# Patient Record
Sex: MALE | Race: AMERICAN INDIAN OR ALASKA NATIVE | Employment: OTHER | URBAN - METROPOLITAN AREA
[De-identification: names, ages, dates, MRNs, and addresses within clinical notes are randomized per-mention and may not be internally consistent; named-entity substitution may affect disease eponyms.]

---

## 2017-01-22 ENCOUNTER — HOSPITAL ENCOUNTER (EMERGENCY)
Facility: CLINIC | Age: 32
Discharge: HOME | End: 2017-01-22
Attending: EMERGENCY MEDICINE | Admitting: EMERGENCY MEDICINE
Payer: COMMERCIAL

## 2017-01-22 VITALS
SYSTOLIC BLOOD PRESSURE: 149 MMHG | OXYGEN SATURATION: 100 % | HEIGHT: 67 IN | WEIGHT: 150 LBS | DIASTOLIC BLOOD PRESSURE: 100 MMHG | BODY MASS INDEX: 23.54 KG/M2 | RESPIRATION RATE: 20 BRPM | TEMPERATURE: 98 F | HEART RATE: 81 BPM

## 2017-01-22 DIAGNOSIS — R68.84 PAIN IN UPPER JAW: ICD-10-CM

## 2017-01-22 DIAGNOSIS — R03.0 ELEVATED BLOOD PRESSURE READING WITHOUT DIAGNOSIS OF HYPERTENSION: ICD-10-CM

## 2017-01-22 PROCEDURE — 99283 EMERGENCY DEPT VISIT LOW MDM: CPT | Mod: 25

## 2017-01-22 PROCEDURE — 64400 NJX AA&/STRD TRIGEMINAL NRV: CPT

## 2017-01-22 RX ORDER — BUPIVACAINE HYDROCHLORIDE 5 MG/ML
INJECTION, SOLUTION PERINEURAL
Status: DISCONTINUED
Start: 2017-01-22 | End: 2017-01-22 | Stop reason: HOSPADM

## 2017-01-22 RX ORDER — CHLORHEXIDINE GLUCONATE ORAL RINSE 1.2 MG/ML
15 SOLUTION DENTAL 2 TIMES DAILY
Qty: 420 ML | Refills: 0 | Status: SHIPPED | OUTPATIENT
Start: 2017-01-22 | End: 2017-02-05

## 2017-01-22 RX ORDER — PENICILLIN V POTASSIUM 500 MG/1
500 TABLET, FILM COATED ORAL 3 TIMES DAILY
Qty: 30 TABLET | Refills: 0 | Status: SHIPPED | OUTPATIENT
Start: 2017-01-22 | End: 2018-02-22

## 2017-01-22 RX ORDER — TRAMADOL HYDROCHLORIDE 50 MG/1
50 TABLET ORAL EVERY 6 HOURS PRN
Qty: 8 TABLET | Refills: 0 | Status: SHIPPED | OUTPATIENT
Start: 2017-01-22 | End: 2018-02-22

## 2017-01-22 ASSESSMENT — ENCOUNTER SYMPTOMS
VOMITING: 0
HEADACHES: 1
FEVER: 0
DIARRHEA: 0

## 2017-01-22 NOTE — ED PROVIDER NOTES
"  History     Chief Complaint:  Dental Pain    HPI   Aly Solitario Jr. is a 31 year old male with a history of alcohol abuse who presents with dental pain. The patient explains that when his parents got divored when he was younger, and since that time, he has not seen a dentist. He also notes that he was a former alcohol abuser that has become sober and during this time, has not taken care of his teeth. Additionally, he has multiple fracutres to his back teeth bilaterally, with decompisition of multiple teeth having occured. The patient reports that for the past two weeks, he has experienced pain, centralized in his upper front and upper back jaw, with radiation up his jaw, giving him a headache. He also states that his pain is \"bugging my ears\", and has not slept well secondary to his pain. During this time, he has taken ibuprofen, which the patient notes as not effective in alleviating his pain. 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 The patient denies a fever, vomiting, or diarrhea.     Allergies:  NKDA     Medications:    The patient is currently on no regular medications.    Past Medical History:    Alcohol abuse    Past Surgical History:    Arm surgery with graft due to artery issue    Family History:    No past pertinent family history.    Social History:  Current every day smoker  Marital Status:  Single [1]     Review of Systems   Constitutional: Negative for fever.   HENT: Positive for dental problem (centralization in upper back teeth).    Gastrointestinal: Negative for vomiting and diarrhea.   Neurological: Positive for headaches.   All other systems reviewed and are negative.        Physical Exam   First Vitals:  BP: (!) 149/100 mmHg  Pulse: 81  Temp: 98  F (36.7  C)  Resp: 20  Height: 170.2 cm (5' 7\")  Weight: 68.04 kg (150 lb)  SpO2: 100 %      Physical Exam  GEN:    Pleasant, age appropriate.  HEENT:    Tympanic membranes are clear bilateral. "      Oropharynx is moist.       No tonsillar erythema without exudate or asymmetric edema.          No deviation of the uvula.     No pooling of secretions, trismus or sublingual edema.     Overall, dentition is very poor.     Dental fractures to teeth #1-2, 4, 13, 14-16 and 20.      No adjacent gingival edema or purulent drainage.  Eyes:    Conjunctiva normal, PERRL  Neck:    Supple, no meningismus.       No pain with manipulation of the hyoid.   CV:     Regular rate and rhythm.     No murmurs, rubs or gallops.    PULM:    Clear to auscultation bilateral.       No respiratory distress.       No stridor.  ABD:    Soft, non-tender, non-distended.      No rebound or guarding.  MSK:     No gross deformity to all four extremities.   LYMPH:   No cervical lymphadenopathy.  NEURO:   Alert.  Normal muscular tone, no atrophy.  Skin:    Warm, dry and intact.    PSYCH:    Mood is good and affect is appropriate.      Emergency Department Course   Procedures:   PROCEDURE NOTE:    PROCEDURE:  Infraorbital nerve block on the left side.   INDICATION:  Jaw pain  PHYSICIAN:  Marvin Marks MD  DESCRIPTION OF PROCEDURE: Informed verbal consent.  Site correctly identified.  Using dental syringe and 0.5% bupivicaine without epinephrine, the left infraorbital nerve was anesthetized using standard technique.  Patient tolerated procedure well, no complications.     Emergency Department Course:  Nursing notes and vitals reviewed.  I performed an exam of the patient as documented above.     1115 I performed an infraorbital nerve block with bupivacaine w/out epi.     Findings and plan explained to the Patient. Patient discharged home with instructions regarding supportive care, medications, and reasons to return. The importance of close follow-up was reviewed. The patient was prescribed Peridex, swish and spit 15mL in mouth two times daily for 14 days, Veetid, 500 mg 3 times daily, and Ultram, 500 mg by mouth every 6 hours as  needed.      Impression & Plan      Medical Decision Making:    Patient was seen in the ED today for dental pain.  On examination there are no signs suggestive of periapical abscess, peritonsillar abscess, retropharyngeal abscess,  Jordan's angina or Lemierre's syndrome.  The patient has diffuse pain but most severe to teeth #9-16 and the pain was controlled in the ED with infraorbital nerve block.  Patient will be discharge home on peridex rinses, PCN and 8 tablets of Tramadol. The patient will be referred to dental clinic for further management of the dental pain.   Patient is encouraged to use ibuprofen for analgesia and instructed to return for fever, worsening pain, inability to swallow, neck pain or any other concerns.      Diagnosis:    ICD-10-CM    1. Pain in upper jaw R68.84    2. Elevated blood pressure reading without diagnosis of hypertension R03.0         Discharge Medications:  New Prescriptions    CHLORHEXIDINE (PERIDEX) 0.12 % SOLUTION    Swish and spit 15 mLs in mouth 2 times daily for 14 days    PENICILLIN V POTASSIUM (VEETID) 500 MG TABLET    Take 1 tablet (500 mg) by mouth 3 times daily    TRAMADOL (ULTRAM) 50 MG TABLET    Take 1 tablet (50 mg) by mouth every 6 hours as needed for pain       Macho COCHRAN, am serving as a scribe on 1/22/2017 at 11:19 AM to personally document services performed by Marvin Marks MD based on my observations and the provider's statements to me.     Macho Staton  1/22/2017   [facility] EMERGENCY DEPARTMENT        Marvin Marks MD  01/22/17 [id number]

## 2017-01-22 NOTE — ED AVS SNAPSHOT
[facility] Emergency Department    [address] [place] [address]    Phone:  [phone]    Fax:  [phone]                                       Aly Solitario Jr.   MRN: [id number]    Department:  [facility] Emergency Department   Date of Visit:  1/22/2017           Patient Information     Date Of Birth          1985        Your diagnoses for this visit were:     Pain in upper jaw     Elevated blood pressure reading without diagnosis of hypertension        You were seen by Marvin Marks MD.        Discharge Instructions       Please make an appointment to follow up with [facility] [phone] in 7-10 days even if entirely better.    Discharge Instructions  Dental Pain    You have been seen today for a toothache. Your pain may be caused by an exposed nerve, an infection (pulpitis), a root abscess, or other problems. You will need to see a dentist for a solution to your tooth problem. Emergency Department care is only to help control your problem until you can see a dentist.  Today, we did not find any sign that your toothache was caused by a serious condition, but sometimes symptoms develop over time and cannot be found during an emergency visit, so it is very important that you follow up with your dentist.      Return to the Emergency Department if:    You develop a fever over 101 degrees Fahrenheit.    You can t open your mouth normally, can t move your tongue well, or can t swallow.    You have new or increased swelling of your face or neck.    You develop drainage of pus or foul smelling material from around your tooth.  What can I do to help myself?    Take any antibiotic the doctor may have prescribed for you today.    Avoid very hot or very cold foods as both can cause pain.    Make an appointment to see a dentist as soon as possible. If you wish, we can provide you with a list of low-cost dental clinics.   If you were  given a prescription for medicine here today, be sure to read all of the information (including the package insert) that comes with your prescription.  This will include important information about the medicine, its side effects, and any warnings that you need to know about.  The pharmacist who fills the prescription can provide more information and answer questions you may have about the medicine.  If you have questions or concerns that the pharmacist cannot address, please call or return to the Emergency Department.   Opioid Medication Information    Pain medications are among the most commonly prescribed medicines, so we are including this information for all our patients. If you did not receive pain medication or get a prescription for pain medicine, you can ignore it.     You may have been given a prescription for an opioid (narcotic) pain medicine and/or have received a pain medicine while here in the Emergency Department. These medicines can make you drowsy or impaired. You must not drive, operate dangerous equipment, or engage in any other dangerous activities while taking these medications. If you drive while taking these medications, you could be arrested for DUI, or driving under the influence. Do not drink any alcohol while you are taking these medications.     Opioid pain medications can cause addiction. If you have a history of chemical dependency of any type, you are at a higher risk of becoming addicted to pain medications.  Only take these prescribed medications to treat your pain when all other options have been tried. Take it for as short a time and as few doses as possible. Store your pain pills in a secure place, as they are frequently stolen and provide a dangerous opportunity for children or visitors in your house to start abusing these powerful medications. We will not replace any lost or stolen medicine.  As soon as your pain is better, you should flush all your remaining medication.      Many prescription pain medications contain Tylenol  (acetaminophen), including Vicodin , Tylenol #3 , Norco , Lortab , and Percocet .  You should not take any extra pills of Tylenol  if you are using these prescription medications or you can get very sick.  Do not ever take more than 3000 mg of acetaminophen in any 24 hour period.    All opioids tend to cause constipation. Drink plenty of water and eat foods that have a lot of fiber, such as fruits, vegetables, prune juice, apple juice and high fiber cereal.  Take a laxative if you don t move your bowels at least every other day. Miralax , Milk of Magnesia, Colace , or Senna  can be used to keep you regular.      Remember that you can always come back to the Emergency Department if you are not able to see your regular doctor in the amount of time listed above, if you get any new symptoms, or if there is anything that worries you.      Discharge Instructions  Dental Pain    You have been seen today for a toothache. Your pain may be caused by an exposed nerve, an infection (pulpitis), a root abscess, or other problems. You will need to see a dentist for a solution to your tooth problem. Emergency Department care is only to help control your problem until you can see a dentist.  Today, we did not find any sign that your toothache was caused by a serious condition, but sometimes symptoms develop over time and cannot be found during an emergency visit, so it is very important that you follow up with your dentist.      Return to the Emergency Department if:    You develop a fever over 101 degrees Fahrenheit    You can t open your mouth normally, can t move your tongue well, or can t swallow    You have new or increased swelling of your face or neck.    You develop drainage of pus or foul smelling material from around your tooth.  What can I do to help myself?    Take any antibiotic the doctor may have prescribed for you today.    Avoid very hot or very cold foods as  both can cause pain.    Make an appointment to see a dentist as soon as possible. If you wish, we can provide you with a list of low-cost dental clinics.       Remember that you can always come back to the Emergency Department if you are not able to see your regular doctor in the amount of time listed above, if you get any new symptoms, or if there is anything that worries you.        Dental Resources  Name/Address/Phone Eligibility Hours Fee   [facility]  [address], [address]  [place] [address]  [phone] Anyone Call for appointment [facility]  Medical Assistance  Private Insurance   [facility]  [facility]  [address] [place] [address]  [phone] Anyone Call for appointment    Mandie refers to low-cost dental clinics for non-preventive care    Lithuanian Interpreters available Prices start at   Adults        Cleaning $36-$160        X-Ray $20-40  Children        Cleaning $15        X-ray $10-20        Fluoride $10  Accepts cash, check or credit;  Does not take insurance or MA.   [facility]  [address] [place]  [address]  [phone] Anyone Afternoons and evenings    September-May    Answers phones after 10 AM $30.00 per visit   ($15.00 per visit if 62 or older)   Preventive care.  Restoration care; sliding fee; MA   [facility]  [address] [place] [address]  [phone] Children birth to age 18 and pregnant women    [facility] Residents without insurance will be asked to apply for Assured Care. M TH F 8:30 am - 5 pm  T W 8:30 am - 7 pm    30 locations metro wide    Call for appointment and to confirm hours. Sliding Fee  [facility]  Medical Assistance  Assured Access  Private Insurance    8 Languages Spoken   [facility] [address] [id number] [phone] Anyone Call for appointment Sliding Fee  Accept insurance, Miguel Ángel OHARA and  self-pay.  Call if no insurance.    All services provided.  Staff fluent in Hmong, Laotian, Pravin, Indian, Turkmen, Pitcairn Islander, and Farsi.   [facility]  [address] [place], [place] [phone] Children  Adults in a walk in basis Mon - Fri. 8 - 5 pm       (closed 1-2 pm)  General Dentists & Hygienists  Private Dentists  Dentures Fees based on family size and income ranging from 40% - 100% payment by patient.   [facility]  [address] [place], [place]  [phone] Anyone Mon - Fri 7:30 am - 5:00 pm  By Appt.    Tues & Wed @ 3:00 call for urgent care Appt for next day service Sliding fee:  MA; Insurance   [facility]ental [facility]  [address] [place]  [address]  [phone] Anyone Call for an appointment.  Days open vary every semester. Adult cleaning $25  Child cleaning $15  X-Rays $10-$15  Whitening services available  $75, includes cleaning  Seniors 50% off   [facility]  [address] [place]  [address]  [phone] Anyone M-F 8 am - 5 pm Most insurances accepted.  MA and Sliding Scale.   [facility]  [address] [place] [address]  [id number]    [phone] Anyone without insurance Call to make appt   M-F 9:00 am - 5 pm   (Closed noon hour 12-1)    6 pm- 8 pm Evening hours also available for care Sliding fee based on income and size of household.   [facility] Clinic  [address] [place] [address]  [id number]  [phone] press option 1    For the [facility] press option 4 Anyone              Anyone Monday  4 - 6:30 pm  Tuesday 12:30 - 3 & 4-6:30  Thursday 8:30 - 11 am & 12-2:30 pm  September through May only    All year around on Thursdays from 5-9 pm (only time a dentist is in.) Cleanings & X-Rays Only  Cleanings:  Adults $30                   Kids $20  X-Rays:  Adults $34                 Kids $10    MA and Sliding fee   [facility]  [address] [place] [address]    [phone] Anyone    (Hmong interpreters available) M-F 8:00 am - 5:00 pm       By appointment only  (same day appointments available) Sliding fee ($40+ may be due at appointment, remainder billed); MA; Insurance   [facility]  [address] [place] [address]    [phone]   Anyone    (Hmong interpreters available) M-Th 8:00 am - 8:00 pm  F 8:00 am - 5:00 pm    By appointment only  (same day appointments available) Sliding fee ($40+ may be due at appointment, remainder billed); MA; Insurance   [facility]  [address] [place], [place]  [address] [phone] Anyone    Must live within [facility] to qualify for sliding fee services Mon, Tues, Thurs, Fri  8:30 am - 5:00 pm  Wed. 8:30 am - 7:00 pm  All other services by appt. only Sliding Fee; MA   Offer payment plans    Have financial workers that will assist with [place] and will use sliding fee for those who do not qualify.      [facility]  [address] [place] [phone] Anyone without insurance     Hours and day of week vary  (Call ahead for hours)    Walk-in only Free Services    Cleanings; Fillings; Extractions   [facility] [address]  [phone] Anyone Call for an appointment Accept patients with [place] and Medical Assistance.  10% discount if bill is paid in full on day of service.  No sliding fee scale.     [facility]  [address] [place], [place] [phone] Anyone M-F  8 am-5 pm  Call for appt.    Walk-in hours 8 am - 11am and 1 pm - 5 pm, however take scheduled appointments first    No emergency services or oral surgeries Sliding Fee available with an [place] or [place] denial letter and proof of income.    Accepts Assured Access card and MA coverage.      Name/Address/Phone Eligibility Hours Fee   [facility]  [address] [place], [place]  [phone] Anyone with emergencies only M & W clinic begins at 6 pm   Call ahead    Alternate Fridays for children by Appt only Free   [facility]  [address] [place] [address]  [phone]    Emergencies (adults only):  [phone] Anyone Free walk-in screens for oral surgery    Call ahead for hours    All other services by appt. only  Accepts MA for pediatrics only    Rates are about 25% - 40% less than private dental office.    No sliding fee scale   [facility]  [address] [place] [address]  [phone] Anyone as long as they do not have health insurance Hours on Mondays, Tuesdays, and Thursdays Sliding fees based on monthly income    No root canals, tooth pulls or emergencies   [facility]  [address] [place] [phone] Anyone  M - F 8:00 am - 5:00 pm  Wed 8:00 am - 8 pm Sliding fees; MA; Insurance   [facility]  [address] [place] [address]  [phone] Anyone age 55+ M - F 8:00 am - 4:30 pm    Appt. only Set slightly lower fees;  MA; Insurance         Give Kids a smile day in [facility] Children Takes place in February at a few locations         Discharge Instructions  Hypertension - High Blood Pressure    During you visit to the Emergency Department, your blood pressure was higher than the recommended blood pressure.  This may be related to stress, pain, medication or other temporary conditions. In these cases, your blood pressure may return to normal on its own. If you have a history of high blood pressure, you may need to have your doctor adjust your medications. Sometimes, your high measurement here may indicate that you have developed high blood pressure that will stay high unless it is treated. Sudden very high blood pressure can cause  problems, but usually high blood pressure causes problems over months to years.      Blood pressure is almost never lowered in the Emergency Department, because studies have shown that lowering blood pressure too quickly is much more dangerous than leaving it alone.    You need to follow up with your doctor in 1-3 days to get your blood pressure rechecked.     Return to the Emergency Department if you start to have:    A severe headache.    Chest pain.    Shortness of breath.    Weakness or numbness that affects one part of the body.    Confusion.    Vision changes.    Significant swelling of legs and/or eyes.    A reaction to any medication started in the Emergency Department.    What can I do to help myself?    Avoid alcohol.    Take any blood pressure medicine that you are prescribed.    Get a good night s sleep.    Lower your salt intake.    Exercise.    Lose weight.    Manage stress.    If blood pressure medication was started in the Emergency Department:    The medicine may not have an immediate effect. The body and brain determine what blood pressure you have. The medicine s job is to retrain the body s  thermostat  to a lower blood pressure.    You will need to follow up with your doctor to see how this medicine is working for you.  If you were given a prescription for medicine here today, be sure to read all of the information (including the package insert) that comes with your prescription.  This will include important information about the medicine, its side effects, and any warnings that you need to know about.  The pharmacist who fills the prescription can provide more information and answer questions you may have about the medicine.  If you have questions or concerns that the pharmacist cannot address, please call or return to the Emergency Department.       24 Hour Appointment Hotline       To make an appointment at any [facility], call [phone]). If you don't have a family  doctor or clinic, we will help you find one. [place] clinics are conveniently located to serve the needs of you and your family.             Review of your medicines      START taking        Dose / Directions Last dose taken    chlorhexidine 0.12 % solution   Commonly known as:  PERIDEX   Dose:  15 mL   Quantity:  420 mL        Swish and spit 15 mLs in mouth 2 times daily for 14 days   Refills:  0        penicillin V potassium 500 MG tablet   Commonly known as:  VEETID   Dose:  500 mg   Quantity:  30 tablet        Take 1 tablet (500 mg) by mouth 3 times daily   Refills:  0        traMADol 50 MG tablet   Commonly known as:  ULTRAM   Dose:  50 mg   Quantity:  8 tablet        Take 1 tablet (50 mg) by mouth every 6 hours as needed for pain   Refills:  0          Our records show that you are taking the medicines listed below. If these are incorrect, please call your family doctor or clinic.        Dose / Directions Last dose taken    IBUPROFEN PO        Refills:  0                Prescriptions were sent or printed at these locations (3 Prescriptions)                   Other Prescriptions                Printed at Department/Unit printer (3 of 3)         penicillin V potassium (VEETID) 500 MG tablet               chlorhexidine (PERIDEX) 0.12 % solution               traMADol (ULTRAM) 50 MG tablet                Orders Needing Specimen Collection     None      Pending Results     No orders found from 1/21/2017 to 1/23/2017.            Pending Culture Results     No orders found from 1/21/2017 to 1/23/2017.             Test Results from your hospital stay            Clinical Quality Measure: Blood Pressure Screening     Your blood pressure was checked while you were in the emergency department today. The last reading we obtained was  BP: (!) 149/100 mmHg . Please read the guidelines below about what these numbers mean and what you should do about them.  If your systolic blood pressure (the top number) is less than 120 and  "your diastolic blood pressure (the bottom number) is less than 80, then your blood pressure is normal. There is nothing more that you need to do about it.  If your systolic blood pressure (the top number) is 120-139 or your diastolic blood pressure (the bottom number) is 80-89, your blood pressure may be higher than it should be. You should have your blood pressure rechecked within a year by a primary care provider.  If your systolic blood pressure (the top number) is 140 or greater or your diastolic blood pressure (the bottom number) is 90 or greater, you may have high blood pressure. High blood pressure is treatable, but if left untreated over time it can put you at risk for heart attack, stroke, or kidney failure. You should have your blood pressure rechecked by a primary care provider within the next 4 weeks.  If your provider in the emergency department today gave you specific instructions to follow-up with your doctor or provider even sooner than that, you should follow that instruction and not wait for up to 4 weeks for your follow-up visit.        Thank you for choosing [facility]       Thank you for choosing [facility] for your care. Our goal is always to provide you with excellent care. Hearing back from our patients is one way we can continue to improve our services. Please take a few minutes to complete the written survey that you may receive in the mail after you visit with us. Thank you!        [employer] Information     [employer] lets you send messages to your doctor, view your test results, renew your prescriptions, schedule appointments and more. To sign up, go to [email or web address] . Click on \"Log in\" on the left side of the screen, which will take you to the Welcome page. Then click on \"Sign up Now\" on the right side of the page.     You will be asked to enter the access code listed below, as well as some personal information. Please follow the directions to create your username and password.   "   Your access code is: [email or web address]  Expires: 2017 11:28 AM     Your access code will  in 90 days. If you need help or a new code, please call your [facility] or [phone].        Care EveryWhere ID     This is your Care EveryWhere ID. This could be used by other organizations to access your [facility] medical records  [id number]        After Visit Summary       This is your record. Keep this with you and show to your community pharmacist(s) and doctor(s) at your next visit.

## 2017-01-22 NOTE — ED AVS SNAPSHOT
[facility] Emergency Department    [address]    [facility] [address]    Phone:  [phone]    Fax:  [phone]                                       Aly Solitario Jr.   MRN: [id number]    Department:  [facility] Emergency Department   Date of Visit:  1/22/2017           After Visit Summary Signature Page     I have received my discharge instructions, and my questions have been answered. I have discussed any challenges I see with this plan with the nurse or doctor.    ..........................................................................................................................................  Patient/Patient Representative Signature      ..........................................................................................................................................  Patient Representative Print Name and Relationship to Patient    ..................................................               ................................................  Date                                            Time    ..........................................................................................................................................  Reviewed by Signature/Title    ...................................................              ..............................................  Date                                                            Time

## 2017-01-22 NOTE — ED NOTES
Arrives with multiple dental complaints, has several broken teeth unable to sleep d/t pain, recently got insurance so hasn't seen dentist yet. A/ox 3.

## 2017-01-22 NOTE — DISCHARGE INSTRUCTIONS
Please make an appointment to follow up with [facility] [phone] in 7-10 days even if entirely better.    Discharge Instructions  Dental Pain    You have been seen today for a toothache. Your pain may be caused by an exposed nerve, an infection (pulpitis), a root abscess, or other problems. You will need to see a dentist for a solution to your tooth problem. Emergency Department care is only to help control your problem until you can see a dentist.  Today, we did not find any sign that your toothache was caused by a serious condition, but sometimes symptoms develop over time and cannot be found during an emergency visit, so it is very important that you follow up with your dentist.      Return to the Emergency Department if:    You develop a fever over 101 degrees Fahrenheit.    You can t open your mouth normally, can t move your tongue well, or can t swallow.    You have new or increased swelling of your face or neck.    You develop drainage of pus or foul smelling material from around your tooth.  What can I do to help myself?    Take any antibiotic the doctor may have prescribed for you today.    Avoid very hot or very cold foods as both can cause pain.    Make an appointment to see a dentist as soon as possible. If you wish, we can provide you with a list of low-cost dental clinics.   If you were given a prescription for medicine here today, be sure to read all of the information (including the package insert) that comes with your prescription.  This will include important information about the medicine, its side effects, and any warnings that you need to know about.  The pharmacist who fills the prescription can provide more information and answer questions you may have about the medicine.  If you have questions or concerns that the pharmacist cannot address, please call or return to the Emergency Department.   Opioid Medication Information    Pain medications are among the most commonly  prescribed medicines, so we are including this information for all our patients. If you did not receive pain medication or get a prescription for pain medicine, you can ignore it.     You may have been given a prescription for an opioid (narcotic) pain medicine and/or have received a pain medicine while here in the Emergency Department. These medicines can make you drowsy or impaired. You must not drive, operate dangerous equipment, or engage in any other dangerous activities while taking these medications. If you drive while taking these medications, you could be arrested for DUI, or driving under the influence. Do not drink any alcohol while you are taking these medications.     Opioid pain medications can cause addiction. If you have a history of chemical dependency of any type, you are at a higher risk of becoming addicted to pain medications.  Only take these prescribed medications to treat your pain when all other options have been tried. Take it for as short a time and as few doses as possible. Store your pain pills in a secure place, as they are frequently stolen and provide a dangerous opportunity for children or visitors in your house to start abusing these powerful medications. We will not replace any lost or stolen medicine.  As soon as your pain is better, you should flush all your remaining medication.     Many prescription pain medications contain Tylenol  (acetaminophen), including Vicodin , Tylenol #3 , Norco , Lortab , and Percocet .  You should not take any extra pills of Tylenol  if you are using these prescription medications or you can get very sick.  Do not ever take more than 3000 mg of acetaminophen in any 24 hour period.    All opioids tend to cause constipation. Drink plenty of water and eat foods that have a lot of fiber, such as fruits, vegetables, prune juice, apple juice and high fiber cereal.  Take a laxative if you don t move your bowels at least every other day. Miralax , Milk of  Magnesia, Colace , or Senna  can be used to keep you regular.      Remember that you can always come back to the Emergency Department if you are not able to see your regular doctor in the amount of time listed above, if you get any new symptoms, or if there is anything that worries you.      Discharge Instructions  Dental Pain    You have been seen today for a toothache. Your pain may be caused by an exposed nerve, an infection (pulpitis), a root abscess, or other problems. You will need to see a dentist for a solution to your tooth problem. Emergency Department care is only to help control your problem until you can see a dentist.  Today, we did not find any sign that your toothache was caused by a serious condition, but sometimes symptoms develop over time and cannot be found during an emergency visit, so it is very important that you follow up with your dentist.      Return to the Emergency Department if:    You develop a fever over 101 degrees Fahrenheit    You can t open your mouth normally, can t move your tongue well, or can t swallow    You have new or increased swelling of your face or neck.    You develop drainage of pus or foul smelling material from around your tooth.  What can I do to help myself?    Take any antibiotic the doctor may have prescribed for you today.    Avoid very hot or very cold foods as both can cause pain.    Make an appointment to see a dentist as soon as possible. If you wish, we can provide you with a list of low-cost dental clinics.       Remember that you can always come back to the Emergency Department if you are not able to see your regular doctor in the amount of time listed above, if you get any new symptoms, or if there is anything that worries you.        Dental Resources  Name/Address/Phone Eligibility Hours Fee   [facility]  [address]  [place] [address] [phone] Anyone Call for appointment [place]  Medical Assistance  Private  Insurance   [facility]  [address] [place] [address]  [phone] Anyone Call for appointment    [facility] refers to low[facility]cost [facility] clinics for non-preventive care    [place]an Interpreters available Prices start at   Adults        Cleaning $36-$160        X-Ray $20-40  Children        Cleaning $15        X-ray $10-20        Fluoride $10  Accepts cash, check or credit;  Does not take insurance or MA.   [facility]  [address] [place]  [address]  [phone] Anyone Afternoons and evenings    September-May    Answers phones after 10 AM $30.00 per visit   ($15.00 per visit if 62 or older)   Preventive care.  Restoration care; sliding fee; MA   [facility]  [address] [place] [address]  [phone] Children birth to age 18 and pregnant women    [facility] Residents without insurance will be asked to apply for Assured Care. M TH F 8:30 am - 5 pm  T W 8:30 am - 7 pm    30 locations metro wide    Call for appointment and to confirm hours. Sliding Fee  [facility]  Medical Assistance  Assured Access  Private Insurance    8 Languages Spoken   [facility]  [address] [place] [address]  [phone] Anyone Call for appointment Sliding Fee  Accept insurance, [place],   [place] and self-pay.  Call if no insurance.    All services provided.  Staff fluent in Hmong, Laotian, Welsh, Slovenian, English, Burkinan, and Farsi.   [facility]  [address] [place], [place] [id number] [phone] Children  Adults in a walk in basis Mon - Fri. 8 - 5 pm       (closed 1-2 pm)  General Dentists & Hygienists  Private Dentists  Dentures Fees based on family size and income ranging from 40% - 100% payment by patient.   [facility]  [address] [place], [place]  [phone] Anyone Mon - Fri 7:30 am - 5:00 pm  By Appt.    Tues & Wed @ 3:00 call  for urgent care Appt for next day service Sliding fee:  MA; Insurance   [facility]  [address] [place]  [address]  [phone] Anyone Call for an appointment.  Days open vary every semester. Adult cleaning $25  Child cleaning $15  X-Rays $10-$15  Whitening services available  $75, includes cleaning  Seniors 50% off   [facility]  [address] [place]  [address]  [phone] Anyone M-F 8 am - 5 pm Most insurances accepted.  MA and Sliding Scale.   [facility]  [address] [place] [address]  [phone] Anyone without insurance Call to make appt   M-F 9:00 am - 5 pm   (Closed noon hour 12-1)    6 pm- 8 pm Evening hours also available for care Sliding fee based on income and size of household.   [facility]  [address] [place] [address]  [address]  [phone] press option 1    For the [facility] press option 4 Anyone              Anyone Monday  4 - 6:30 pm  Tuesday 12:30 - 3 & 4-6:30  Thursday 8:30 - 11 am & 12-2:30 pm  September through May only    All year around on Thursdays from 5-9 pm (only time a dentist is in.) Cleanings & X-Rays Only  Cleanings:  Adults $30                   Kids $20  X-Rays:  Adults $34                Kids $10    MA and Sliding fee   [facility]  [address] [place], [place] [phone] Anyone    ([employer] interpreters available) M-F 8:00 am - 5:00 pm       By appointment only  (same day appointments available) Sliding fee ($40+ may be due at appointment, remainder billed); MA; Insurance   [facility]  [address] [place] [address] [phone]   Anyone    ([place] interpreters available) M-Th 8:00 am - 8:00 pm  F 8:00 am - 5:00 pm    By appointment only  (same day appointments available) Sliding fee ($40+ may be due at appointment, remainder billed); MA;  Insurance   [facility]  [address] [place]  [address]  [phone] Anyone    Must live within [facility] to qualify for sliding fee services Mon, Tues, Thurs, Fri  8:30 am - 5:00 pm  Wed. 8:30 am - 7:00 pm  All other services by appt. only Sliding Fee; MA   Offer payment plans    Have financial workers that will assist with [place] and will use sliding fee for those who do not qualify.      [facility]  [address] [place] [phone] Anyone without insurance     Hours and day of week vary  (Call ahead for hours)    Walk-in only Free Services    Cleanings; Fillings; Extractions   [address]  [phone] Anyone Call for an appointment Accept patients with [place] and Medical Assistance.  10% discount if bill is paid in full on day of service.  No sliding fee scale.     [facility]  [address] [place], [place] [phone] Anyone M-F  8 am-5 pm  Call for appt.    Walk-in hours 8 am - 11am and 1 pm - 5 pm, however take scheduled appointments first    No emergency services or oral surgeries Sliding Fee available with an [place] or [place] denial letter and proof of income.    Accepts Assured Access card and MA coverage.     Name/Address/Phone Eligibility Hours Fee   [facility]  [address] [place], [place]  [phone] Anyone with emergencies only M & W clinic begins at 6 pm   Call ahead    Alternate Fridays for children by Appt only Free   [facility]  [address] [place], [place] [address] [phone]    Emergencies (adults only):  [phone] Anyone Free walk-in screens for oral surgery    Call ahead for hours    All other services by appt. only  Accepts MA for pediatrics only    Rates are about 25% - 40% less than private dental office.    No sliding fee scale    [facility]  [facility] [place]  [address]  [phone] Anyone as long as they do not have health insurance Hours on Mondays, Tuesdays, and Thursdays Sliding fees based on monthly income    No root canals, tooth pulls or emergencies   [facility]  [address] [phone] Anyone  M - F 8:00 am - 5:00 pm  Wed 8:00 am - 8 pm Sliding fees; MA; Insurance   [facility]  [address] [place] [address]  [phone] Anyone age 55+ M - F 8:00 am - 4:30 pm    Appt. only Set slightly lower fees;  MA; Insurance         Give Kids a smile day in [facility] Children Takes place in February at a few locations         Discharge Instructions  Hypertension - High Blood Pressure    During you visit to the Emergency Department, your blood pressure was higher than the recommended blood pressure.  This may be related to stress, pain, medication or other temporary conditions. In these cases, your blood pressure may return to normal on its own. If you have a history of high blood pressure, you may need to have your doctor adjust your medications. Sometimes, your high measurement here may indicate that you have developed high blood pressure that will stay high unless it is treated. Sudden very high blood pressure can cause problems, but usually high blood pressure causes problems over months to years.      Blood pressure is almost never lowered in the Emergency Department, because studies have shown that lowering blood pressure too quickly is much more dangerous than leaving it alone.    You need to follow up with your doctor in 1-3 days to get your blood pressure rechecked.     Return to the Emergency Department if you start to have:    A severe headache.    Chest pain.    Shortness of breath.    Weakness or numbness that affects one part of the body.    Confusion.    Vision changes.    Significant swelling of legs and/or eyes.    A  reaction to any medication started in the Emergency Department.    What can I do to help myself?    Avoid alcohol.    Take any blood pressure medicine that you are prescribed.    Get a good night s sleep.    Lower your salt intake.    Exercise.    Lose weight.    Manage stress.    If blood pressure medication was started in the Emergency Department:    The medicine may not have an immediate effect. The body and brain determine what blood pressure you have. The medicine s job is to retrain the body s  thermostat  to a lower blood pressure.    You will need to follow up with your doctor to see how this medicine is working for you.  If you were given a prescription for medicine here today, be sure to read all of the information (including the package insert) that comes with your prescription.  This will include important information about the medicine, its side effects, and any warnings that you need to know about.  The pharmacist who fills the prescription can provide more information and answer questions you may have about the medicine.  If you have questions or concerns that the pharmacist cannot address, please call or return to the Emergency Department.

## 2018-02-22 ENCOUNTER — HOSPITAL ENCOUNTER (EMERGENCY)
Facility: CLINIC | Age: 33
Discharge: HOME | End: 2018-02-23
Attending: EMERGENCY MEDICINE | Admitting: EMERGENCY MEDICINE

## 2018-02-22 DIAGNOSIS — F10.229 ACUTE ALCOHOLIC INTOXICATION IN ALCOHOLISM WITH COMPLICATION (H): ICD-10-CM

## 2018-02-22 PROCEDURE — 99285 EMERGENCY DEPT VISIT HI MDM: CPT | Mod: 25

## 2018-02-22 PROCEDURE — 96361 HYDRATE IV INFUSION ADD-ON: CPT

## 2018-02-22 PROCEDURE — 96374 THER/PROPH/DIAG INJ IV PUSH: CPT

## 2018-02-22 NOTE — ED AVS SNAPSHOT
[facility] Emergency Department    [address] [place] [address]    Phone:  [phone]    Fax:  [phone]                                       Aly Solitario Jr.   MRN: [id number]    Department:  [facility] Emergency Department   Date of Visit:  2/22/2018           Patient Information     Date Of Birth          1985        Your diagnoses for this visit were:     Acute alcoholic intoxication in alcoholism with complication (H)        You were seen by aD Rice MD.      Follow-up Information     Follow up with detox. Call today.        Discharge Instructions       Discharge Instructions  Alcohol Intoxication    You have been seen today with alcohol intoxication. This means that you have enough alcohol in your system to impair your ability to mentally and physically function. When you are intoxicated, we are not allowed to release you without a sober adult to be with you. You may not drive, operate dangerous equipment, or do anything else dangerous until you are sober.    You may have come to the Emergency Department because of your intoxication, or for another reason, such as because of an injury. No matter what the case is, this visit is a  red flag  regarding alcohol use, and you should consider whether your drinking pattern is a problem for you.     You may be at risk for alcohol-related problems if:      Men: you drink more than 14 drinks per week, or more than 4 drinks per occasion.      Women: you drink more than 7 drinks per week or more than 3 drinks per occasion.      You have black-outs.    You do things you regret while drinking.    You have legal problems because of drinking (DUI).    You have job problems because of drinking (you call in sick to work because of drinking).    CAGE Questions    Have you ever felt you should cut down on your drinking?    Have people annoyed you by criticizing your drinking?    Have you ever felt bad or guilty  about your drinking?    Have you ever had a drink first thing in the morning to steady your nerves or get rid of a hangover (eye opener)?    If you answer yes to any of the CAGE questions, you may have a problem with alcohol.      Return to the Emergency Department if:    You become shaky or tremble when you try to stop drinking.      You have a seizure or pass out.      You throw up (vomit) blood. This may be bright red or it may look like black coffee grounds.      You have blood in the stool. This may be bright red or appear as a black, tarry, bad smelling stool.      You become lightheaded or faint.      For further help, contact:     Your caregiver.      Alcoholics Anonymous (AA).      A drug or alcohol rehabilitation program.      You can get information on alcohol resources and groups by calling the number 517 or [phone] on any phone.       Seek medical care if:    You have persistent vomiting.      You have persistent pain in any part of your body.      You do not feel better after a few days.    If you were given a prescription for medicine here today, be sure to read all of the information (including the package insert) that comes with your prescription.  This will include important information about the medicine, its side effects, and any warnings that you need to know about.  The pharmacist who fills the prescription can provide more information and answer questions you may have about the medicine.  If you have questions or concerns that the pharmacist cannot address, please call or return to the Emergency Department.   Remember that you can always come back to the Emergency Department if you are not able to see your regular doctor in the amount of time listed above, if you get any new symptoms, or if there is anything that worries you.      ]    24 Hour Appointment Hotline       To make an appointment at any [facility], call [phone] ([phone]). If you don't have a family doctor  or clinic, we will help you find one. [facility] are conveniently located to serve the needs of you and your family.             Review of your medicines      Notice     You have not been prescribed any medications.            Procedures and tests performed during your visit     Alcohol ethyl    Alcohol level blood    CBC with platelets differential    Comprehensive metabolic panel      Orders Needing Specimen Collection     None      Pending Results     No orders found for last 3 day(s).            Pending Culture Results     No orders found for last 3 day(s).            Pending Results Instructions     If you had any lab results that were not finalized at the time of your Discharge, you can call the ED Lab Result RN at [phone]. You will be contacted by this team for any positive Lab results or changes in treatment. The nurses are available 7 days a week from 10A to 6:30P.  You can leave a message 24 hours per day and they will return your call.        Test Results From Your Hospital Stay        2/23/2018  1:07 AM      Component Results     Component Value Ref Range & Units Status    Ethanol g/dL 0.30 (H) <0.01 g/dL Final    Critical Value called to and read back by  ANASTASIA MATTHEWS 0102 02.23.18 CHAS           2/23/2018  1:03 AM      Component Results     Component Value Ref Range & Units Status    WBC 7.8 4.0 - 11.0 10e9/L Final    RBC Count 5.21 4.4 - 5.9 10e12/L Final    Hemoglobin 15.5 13.3 - 17.7 g/dL Final    Hematocrit 44.9 40.0 - 53.0 % Final    MCV 86 78 - 100 fl Final    MCH 29.8 26.5 - 33.0 pg Final    MCHC 34.5 31.5 - 36.5 g/dL Final    RDW 12.7 10.0 - 15.0 % Final    Platelet Count 322 150 - 450 10e9/L Final    Diff Method Automated Method  Final    % Neutrophils 58.6 % Final    % Lymphocytes 32.7 % Final    % Monocytes 6.6 % Final    % Eosinophils 1.1 % Final    % Basophils 0.9 % Final    % Immature Granulocytes 0.1 % Final    Nucleated RBCs 0 0 /100 Final    Absolute Neutrophil 4.6  1.6 - 8.3 10e9/L Final    Absolute Lymphocytes 2.6 0.8 - 5.3 10e9/L Final    Absolute Monocytes 0.5 0.0 - 1.3 10e9/L Final    Absolute Eosinophils 0.1 0.0 - 0.7 10e9/L Final    Absolute Basophils 0.1 0.0 - 0.2 10e9/L Final    Abs Immature Granulocytes 0.0 0 - 0.4 10e9/L Final    Absolute Nucleated RBC 0.0  Final         2/23/2018  1:23 AM      Component Results     Component Value Ref Range & Units Status    Sodium 141 133 - 144 mmol/L Final    Potassium 3.4 3.4 - 5.3 mmol/L Final    Chloride 106 94 - 109 mmol/L Final    Carbon Dioxide 29 20 - 32 mmol/L Final    Anion Gap 6 3 - 14 mmol/L Final    Glucose 102 (H) 70 - 99 mg/dL Final    Urea Nitrogen 6 (L) 7 - 30 mg/dL Final    Creatinine 0.73 0.66 - 1.25 mg/dL Final    GFR Estimate >90 >60 mL/min/1.7m2 Final    Non  GFR Calc    GFR Estimate If Black >90 >60 mL/min/1.7m2 Final    African American GFR Calc    Calcium 7.9 (L) 8.5 - 10.1 mg/dL Final    Bilirubin Total 0.2 0.2 - 1.3 mg/dL Final    Albumin 3.8 3.4 - 5.0 g/dL Final    Protein Total 8.0 6.8 - 8.8 g/dL Final    Alkaline Phosphatase 90 40 - 150 U/L Final    ALT 23 0 - 70 U/L Final    AST 21 0 - 45 U/L Final         2/23/2018  1:24 AM      Component Results     Component Value Ref Range & Units Status    Ethanol g/dL 0.30 (H) <0.01 g/dL Final                Clinical Quality Measure: Blood Pressure Screening     Your blood pressure was checked while you were in the emergency department today. The last reading we obtained was  BP: 106/69 . Please read the guidelines below about what these numbers mean and what you should do about them.  If your systolic blood pressure (the top number) is less than 120 and your diastolic blood pressure (the bottom number) is less than 80, then your blood pressure is normal. There is nothing more that you need to do about it.  If your systolic blood pressure (the top number) is 120-139 or your diastolic blood pressure (the bottom number) is 80-89, your blood  "pressure may be higher than it should be. You should have your blood pressure rechecked within a year by a primary care provider.  If your systolic blood pressure (the top number) is 140 or greater or your diastolic blood pressure (the bottom number) is 90 or greater, you may have high blood pressure. High blood pressure is treatable, but if left untreated over time it can put you at risk for heart attack, stroke, or kidney failure. You should have your blood pressure rechecked by a primary care provider within the next 4 weeks.  If your provider in the emergency department today gave you specific instructions to follow-up with your doctor or provider even sooner than that, you should follow that instruction and not wait for up to 4 weeks for your follow-up visit.        Thank you for choosing [facility]       Thank you for choosing [facility] for your care. Our goal is always to provide you with excellent care. Hearing back from our patients is one way we can continue to improve our services. Please take a few minutes to complete the written survey that you may receive in the mail after you visit with us. Thank you!        [facility] Information     [facility] lets you send messages to your doctor, view your test results, renew your prescriptions, schedule appointments and more. To sign up, go to [email or web address] . Click on \"Log in\" on the left side of the screen, which will take you to the Welcome page. Then click on \"Sign up Now\" on the right side of the page.     You will be asked to enter the access code listed below, as well as some personal information. Please follow the directions to create your username and password.     Your access code is: [email or web address]  Expires: 2018  2:56 AM     Your access code will  in 90 days. If you need help or a new code, please call your [facility] clinic or [phone].        Care EveryWhere ID     This is your Care EveryWhere ID. This could be used by other organizations " to access your [facility] medical records  [id number]        Equal Access to Services     PHOENIX CHAN : Clementine Johnson, norman abebe, elena bartlett. So [facility] [phone].    ATENCIÓN: Si habla español, tiene a restrepo disposición servicios gratuitos de asistencia lingüística. Llame al [phone].    We comply with applicable federal civil rights laws and [place] laws. We do not discriminate on the basis of race, color, national origin, age, disability, sex, sexual orientation, or gender identity.            After Visit Summary       This is your record. Keep this with you and show to your community pharmacist(s) and doctor(s) at your next visit.

## 2018-02-22 NOTE — ED AVS SNAPSHOT
[facility] Emergency Department    [address]    [facility] [address]    Phone:  [phone]    Fax:  [phone]                                       Aly Solitario Jr.   MRN: [id number]    Department:  [facility] Emergency Department   Date of Visit:  2/22/2018           After Visit Summary Signature Page     I have received my discharge instructions, and my questions have been answered. I have discussed any challenges I see with this plan with the nurse or doctor.    ..........................................................................................................................................  Patient/Patient Representative Signature      ..........................................................................................................................................  Patient Representative Print Name and Relationship to Patient    ..................................................               ................................................  Date                                            Time    ..........................................................................................................................................  Reviewed by Signature/Title    ...................................................              ..............................................  Date                                                            Time

## 2018-02-23 VITALS
SYSTOLIC BLOOD PRESSURE: 104 MMHG | OXYGEN SATURATION: 96 % | WEIGHT: 130 LBS | BODY MASS INDEX: 20.36 KG/M2 | DIASTOLIC BLOOD PRESSURE: 82 MMHG

## 2018-02-23 LAB
ALBUMIN SERPL-MCNC: 3.8 G/DL (ref 3.4–5)
ALP SERPL-CCNC: 90 U/L (ref 40–150)
ALT SERPL W P-5'-P-CCNC: 23 U/L (ref 0–70)
ANION GAP SERPL CALCULATED.3IONS-SCNC: 6 MMOL/L (ref 3–14)
AST SERPL W P-5'-P-CCNC: 21 U/L (ref 0–45)
BASOPHILS # BLD AUTO: 0.1 10E9/L (ref 0–0.2)
BASOPHILS NFR BLD AUTO: 0.9 %
BILIRUB SERPL-MCNC: 0.2 MG/DL (ref 0.2–1.3)
BUN SERPL-MCNC: 6 MG/DL (ref 7–30)
CALCIUM SERPL-MCNC: 7.9 MG/DL (ref 8.5–10.1)
CHLORIDE SERPL-SCNC: 106 MMOL/L (ref 94–109)
CO2 SERPL-SCNC: 29 MMOL/L (ref 20–32)
CREAT SERPL-MCNC: 0.73 MG/DL (ref 0.66–1.25)
DIFFERENTIAL METHOD BLD: NORMAL
EOSINOPHIL # BLD AUTO: 0.1 10E9/L (ref 0–0.7)
EOSINOPHIL NFR BLD AUTO: 1.1 %
ERYTHROCYTE [DISTWIDTH] IN BLOOD BY AUTOMATED COUNT: 12.7 % (ref 10–15)
ETHANOL SERPL-MCNC: 0.3 G/DL
ETHANOL SERPL-MCNC: 0.3 G/DL
GFR SERPL CREATININE-BSD FRML MDRD: >90 ML/MIN/1.7M2
GLUCOSE SERPL-MCNC: 102 MG/DL (ref 70–99)
HCT VFR BLD AUTO: 44.9 % (ref 40–53)
HGB BLD-MCNC: 15.5 G/DL (ref 13.3–17.7)
IMM GRANULOCYTES # BLD: 0 10E9/L (ref 0–0.4)
IMM GRANULOCYTES NFR BLD: 0.1 %
LYMPHOCYTES # BLD AUTO: 2.6 10E9/L (ref 0.8–5.3)
LYMPHOCYTES NFR BLD AUTO: 32.7 %
MCH RBC QN AUTO: 29.8 PG (ref 26.5–33)
MCHC RBC AUTO-ENTMCNC: 34.5 G/DL (ref 31.5–36.5)
MCV RBC AUTO: 86 FL (ref 78–100)
MONOCYTES # BLD AUTO: 0.5 10E9/L (ref 0–1.3)
MONOCYTES NFR BLD AUTO: 6.6 %
NEUTROPHILS # BLD AUTO: 4.6 10E9/L (ref 1.6–8.3)
NEUTROPHILS NFR BLD AUTO: 58.6 %
NRBC # BLD AUTO: 0 10*3/UL
NRBC BLD AUTO-RTO: 0 /100
PLATELET # BLD AUTO: 322 10E9/L (ref 150–450)
POTASSIUM SERPL-SCNC: 3.4 MMOL/L (ref 3.4–5.3)
PROT SERPL-MCNC: 8 G/DL (ref 6.8–8.8)
RBC # BLD AUTO: 5.21 10E12/L (ref 4.4–5.9)
SODIUM SERPL-SCNC: 141 MMOL/L (ref 133–144)
WBC # BLD AUTO: 7.8 10E9/L (ref 4–11)

## 2018-02-23 PROCEDURE — 85025 COMPLETE CBC W/AUTO DIFF WBC: CPT | Performed by: EMERGENCY MEDICINE

## 2018-02-23 PROCEDURE — 25000132 ZZH RX MED GY IP 250 OP 250 PS 637: Performed by: EMERGENCY MEDICINE

## 2018-02-23 PROCEDURE — 25000128 H RX IP 250 OP 636: Performed by: EMERGENCY MEDICINE

## 2018-02-23 PROCEDURE — 80053 COMPREHEN METABOLIC PANEL: CPT | Performed by: EMERGENCY MEDICINE

## 2018-02-23 PROCEDURE — 80320 DRUG SCREEN QUANTALCOHOLS: CPT | Performed by: EMERGENCY MEDICINE

## 2018-02-23 RX ORDER — LORAZEPAM 2 MG/ML
1 INJECTION INTRAMUSCULAR ONCE
Status: COMPLETED | OUTPATIENT
Start: 2018-02-23 | End: 2018-02-23

## 2018-02-23 RX ORDER — LANOLIN ALCOHOL/MO/W.PET/CERES
100 CREAM (GRAM) TOPICAL ONCE
Status: COMPLETED | OUTPATIENT
Start: 2018-02-23 | End: 2018-02-23

## 2018-02-23 RX ORDER — SODIUM CHLORIDE 9 MG/ML
1000 INJECTION, SOLUTION INTRAVENOUS CONTINUOUS
Status: DISCONTINUED | OUTPATIENT
Start: 2018-02-23 | End: 2018-02-23 | Stop reason: HOSPADM

## 2018-02-23 RX ADMIN — SODIUM CHLORIDE 1000 ML: 9 INJECTION, SOLUTION INTRAVENOUS at 01:05

## 2018-02-23 RX ADMIN — LORAZEPAM 1 MG: 2 INJECTION INTRAMUSCULAR; INTRAVENOUS at 01:08

## 2018-02-23 RX ADMIN — SODIUM CHLORIDE 1000 ML: 9 INJECTION, SOLUTION INTRAVENOUS at 02:24

## 2018-02-23 RX ADMIN — Medication 100 MG: at 01:05

## 2018-02-23 ASSESSMENT — ENCOUNTER SYMPTOMS
TREMORS: 1
DIAPHORESIS: 1

## 2018-02-23 NOTE — ED PROVIDER NOTES
History     Chief Complaint:  Alcohol Problem      HPI   Aly Solitario Jr. is a 32 year old male with a history of alcohol abuse and alcohol withdrawal seizures who presents to the emergency department for evaluation of an alcohol problem. The patient reports that he had been sober for two years before he lost his brother to suicide one week ago. The patient began drinking again three days ago, and states that he has drank about two bottles of liquor since then. He presents here complaining of tremors and diaphoresis, with concern for alcohol withdrawal seizures. He states that his last drink was about 5 hours before presenting to the emergency department. Though the patient is grieving the loss of his brother, he denies suicidality or thoughts of harming himself. He would not like to speak with DEC about his mental health at this time.    Allergies:  Allergies reviewed. No pertinent allergies.     Medications:    Medications reviewed. No pertinent outpatient prescriptions.    Past Medical History:    Substance abuse    Past Surgical History:    Arm surgery with graft, due to artery issue    Family History:    Family history reviewed. No pertinent family history.    Social History:  The patient presented alone to the emergency department, but was accompanied later by his girlfriend who brought him home.  Smoking Status: Current Every Day Smoker (0.5 pack/day)  Smokes marijuana occasionally.  Smokeless Tobacco: Unknown  Alcohol Use: 2 bottles of liquor in the past three days. History of alcohol abuse.  Marital Status: Single     Review of Systems   Constitutional: Positive for diaphoresis.   Neurological: Positive for tremors.   Psychiatric/Behavioral: Negative for self-injury and suicidal ideas.   All other systems reviewed and are negative.    Physical Exam     Patient Vitals for the past 24 hrs:   BP Heart Rate SpO2 Weight   02/23/18 0300 104/82 - - -   02/23/18 0200 107/80 - - -   02/23/18 0145 106/69 - - -    02/23/18 0130 104/70 - - -   02/23/18 0115 115/75 - - -   02/23/18 0100 117/83 - 96 % -   02/23/18 0045 121/88 - 97 % -   02/23/18 0030 129/87 - 97 % -   02/23/18 0015 (!) 128/91 - 97 % -   02/23/18 0014 - - 97 % -   02/23/18 0002 (!) 139/94 - - -   02/22/18 2354 (!) 141/101 107 97 % 59 kg (130 lb)     Physical Exam  Constitutional:  Oriented to person, place, and time. Intoxicated.  HENT:   Head:    Normocephalic.   Mouth/Throat:   Oropharynx is clear and moist.   Eyes:    EOM are normal. Pupils are equal, round, and reactive to light.   Neck:    Neck supple.   Cardiovascular:  Normal rate, regular rhythm and normal heart sounds.      Exam reveals no gallop and no friction rub.       No murmur heard.  Pulmonary/Chest:  Effort normal and breath sounds normal.      No respiratory distress. No wheezes. No rales.      No reproducible chest wall pain.  Abdominal:   Soft. No distension. No tenderness. No rebound and no guarding.   Musculoskeletal:  Normal range of motion.   Neurological:   Intoxicated.     Alert and oriented to person, place, and time.           Moves all 4 extremities spontaneously    Skin:    No rash noted. No pallor.   Psych:   No suicidal ideation.    Emergency Department Course     Laboratory:  Laboratory findings were communicated with the patient who voiced understanding of the findings.    Alcohol level blood (Collected 0010): 0.30 (H)  CBC: WBC 7.8, HGB 15.5,   CMP: Glucose 102 (H), BUN 6 (L), Calcium 7.9 (L) o/w WNL (Creatinine 0.73)    Interventions:  0105 Thiamine 100 mg PO   0105 NS Bolus IV   0108 Ativan 1 mg IV   0224 NS Bolus IV     Emergency Department Course:     Nursing notes and vitals reviewed.     I performed an exam of the patient as documented above.     IV was inserted and blood was drawn for laboratory testing, results above.     I personally reviewed the laboratory results with the patient and answered all related questions prior to discharge. The patient's girlfriend  is here to drive him home.    Impression & Plan      Medical Decision Making:  Aly Solitario Jr. is a 32 year old male who presents for evaluation of alcohol intoxication.  He is intoxicated here in ED by blood work.  Blood work otherwise looks ok; no signs of alcoholic ketoacidosis, significant liver impairment or acute alcoholic hepatitis. There are no signs of co-ingestion including acetaminophen, drugs, medications, volatile alcohols. He has no signs of trauma related to alcohol use and no further workup is needed including head CT.     Sober ride obtained.  Alcohol counseling provided by myself and patient does not want treatment resources.  DEC/SW did not evaluate patient, as the patient did not want mental health evaluation at this time.        Diagnosis:    ICD-10-CM    1. Acute alcoholic intoxication in alcoholism with complication (H) F10.229      Disposition:   The patient was discharged home with sober girlfriend.    Scribe Disclosure:  Ellen COCHRAN, am serving as a scribe at 12:29 AM on 2/23/2018 to document services personally performed by Da Rice MD based on my observations and the provider's statements to me.    [facility] EMERGENCY DEPARTMENT       Da Rice MD  02/23/18 0456

## 2018-02-23 NOTE — DISCHARGE INSTRUCTIONS
Discharge Instructions  Alcohol Intoxication    You have been seen today with alcohol intoxication. This means that you have enough alcohol in your system to impair your ability to mentally and physically function. When you are intoxicated, we are not allowed to release you without a sober adult to be with you. You may not drive, operate dangerous equipment, or do anything else dangerous until you are sober.    You may have come to the Emergency Department because of your intoxication, or for another reason, such as because of an injury. No matter what the case is, this visit is a  red flag  regarding alcohol use, and you should consider whether your drinking pattern is a problem for you.     You may be at risk for alcohol-related problems if:      Men: you drink more than 14 drinks per week, or more than 4 drinks per occasion.      Women: you drink more than 7 drinks per week or more than 3 drinks per occasion.      You have black-outs.    You do things you regret while drinking.    You have legal problems because of drinking (DUI).    You have job problems because of drinking (you call in sick to work because of drinking).    CAGE Questions    Have you ever felt you should cut down on your drinking?    Have people annoyed you by criticizing your drinking?    Have you ever felt bad or guilty about your drinking?    Have you ever had a drink first thing in the morning to steady your nerves or get rid of a hangover (eye opener)?    If you answer yes to any of the CAGE questions, you may have a problem with alcohol.      Return to the Emergency Department if:    You become shaky or tremble when you try to stop drinking.      You have a seizure or pass out.      You throw up (vomit) blood. This may be bright red or it may look like black coffee grounds.      You have blood in the stool. This may be bright red or appear as a black, tarry, bad smelling stool.      You become lightheaded or faint.      For further help,  contact:     Your caregiver.      Alcoholics Anonymous (AA).      A drug or alcohol rehabilitation program.      You can get information on alcohol resources and groups by calling the number 739 or 1-847.427.5387 on any phone.       Seek medical care if:    You have persistent vomiting.      You have persistent pain in any part of your body.      You do not feel better after a few days.    If you were given a prescription for medicine here today, be sure to read all of the information (including the package insert) that comes with your prescription.  This will include important information about the medicine, its side effects, and any warnings that you need to know about.  The pharmacist who fills the prescription can provide more information and answer questions you may have about the medicine.  If you have questions or concerns that the pharmacist cannot address, please call or return to the Emergency Department.   Remember that you can always come back to the Emergency Department if you are not able to see your regular doctor in the amount of time listed above, if you get any new symptoms, or if there is anything that worries you.      ]

## 2018-02-23 NOTE — ED NOTES
Alert and oriented x 3 airway,breathing and circulation intact. Has been drinking for 3 days after being sober for 2 years, lost his brother to suicide on thursday

## 2018-02-25 ENCOUNTER — APPOINTMENT (OUTPATIENT)
Dept: ULTRASOUND IMAGING | Facility: CLINIC | Age: 33
End: 2018-02-25
Attending: EMERGENCY MEDICINE

## 2018-02-25 ENCOUNTER — HOSPITAL ENCOUNTER (EMERGENCY)
Facility: CLINIC | Age: 33
Discharge: HOME | End: 2018-02-25
Attending: EMERGENCY MEDICINE | Admitting: EMERGENCY MEDICINE

## 2018-02-25 VITALS
RESPIRATION RATE: 20 BRPM | HEIGHT: 67 IN | WEIGHT: 140 LBS | DIASTOLIC BLOOD PRESSURE: 77 MMHG | TEMPERATURE: 98.7 F | SYSTOLIC BLOOD PRESSURE: 113 MMHG | OXYGEN SATURATION: 96 % | BODY MASS INDEX: 21.97 KG/M2 | HEART RATE: 102 BPM

## 2018-02-25 DIAGNOSIS — F10.10 ETOH ABUSE: ICD-10-CM

## 2018-02-25 DIAGNOSIS — R10.13 ABDOMINAL PAIN, EPIGASTRIC: ICD-10-CM

## 2018-02-25 DIAGNOSIS — R11.2 NON-INTRACTABLE VOMITING WITH NAUSEA, UNSPECIFIED VOMITING TYPE: ICD-10-CM

## 2018-02-25 LAB
ALBUMIN SERPL-MCNC: 3.7 G/DL (ref 3.4–5)
ALP SERPL-CCNC: 95 U/L (ref 40–150)
ALT SERPL W P-5'-P-CCNC: 39 U/L (ref 0–70)
ANION GAP SERPL CALCULATED.3IONS-SCNC: 9 MMOL/L (ref 3–14)
AST SERPL W P-5'-P-CCNC: 61 U/L (ref 0–45)
BASOPHILS # BLD AUTO: 0.1 10E9/L (ref 0–0.2)
BASOPHILS NFR BLD AUTO: 0.8 %
BILIRUB SERPL-MCNC: 0.5 MG/DL (ref 0.2–1.3)
BUN SERPL-MCNC: 10 MG/DL (ref 7–30)
CALCIUM SERPL-MCNC: 8.3 MG/DL (ref 8.5–10.1)
CHLORIDE SERPL-SCNC: 107 MMOL/L (ref 94–109)
CO2 SERPL-SCNC: 26 MMOL/L (ref 20–32)
CREAT SERPL-MCNC: 0.71 MG/DL (ref 0.66–1.25)
DIFFERENTIAL METHOD BLD: NORMAL
EOSINOPHIL # BLD AUTO: 0.1 10E9/L (ref 0–0.7)
EOSINOPHIL NFR BLD AUTO: 1.3 %
ERYTHROCYTE [DISTWIDTH] IN BLOOD BY AUTOMATED COUNT: 12.8 % (ref 10–15)
ETHANOL SERPL-MCNC: 0.3 G/DL
GFR SERPL CREATININE-BSD FRML MDRD: >90 ML/MIN/1.7M2
GLUCOSE SERPL-MCNC: 98 MG/DL (ref 70–99)
HCT VFR BLD AUTO: 46.2 % (ref 40–53)
HGB BLD-MCNC: 16 G/DL (ref 13.3–17.7)
IMM GRANULOCYTES # BLD: 0 10E9/L (ref 0–0.4)
IMM GRANULOCYTES NFR BLD: 0.4 %
LIPASE SERPL-CCNC: 206 U/L (ref 73–393)
LYMPHOCYTES # BLD AUTO: 2.4 10E9/L (ref 0.8–5.3)
LYMPHOCYTES NFR BLD AUTO: 30.5 %
MCH RBC QN AUTO: 29.5 PG (ref 26.5–33)
MCHC RBC AUTO-ENTMCNC: 34.6 G/DL (ref 31.5–36.5)
MCV RBC AUTO: 85 FL (ref 78–100)
MONOCYTES # BLD AUTO: 0.5 10E9/L (ref 0–1.3)
MONOCYTES NFR BLD AUTO: 5.6 %
NEUTROPHILS # BLD AUTO: 4.9 10E9/L (ref 1.6–8.3)
NEUTROPHILS NFR BLD AUTO: 61.4 %
NRBC # BLD AUTO: 0 10*3/UL
NRBC BLD AUTO-RTO: 0 /100
PLATELET # BLD AUTO: 322 10E9/L (ref 150–450)
POTASSIUM SERPL-SCNC: 3.6 MMOL/L (ref 3.4–5.3)
PROT SERPL-MCNC: 8 G/DL (ref 6.8–8.8)
RBC # BLD AUTO: 5.42 10E12/L (ref 4.4–5.9)
SODIUM SERPL-SCNC: 142 MMOL/L (ref 133–144)
WBC # BLD AUTO: 8 10E9/L (ref 4–11)

## 2018-02-25 PROCEDURE — 80320 DRUG SCREEN QUANTALCOHOLS: CPT | Performed by: EMERGENCY MEDICINE

## 2018-02-25 PROCEDURE — 96374 THER/PROPH/DIAG INJ IV PUSH: CPT

## 2018-02-25 PROCEDURE — 80053 COMPREHEN METABOLIC PANEL: CPT | Performed by: EMERGENCY MEDICINE

## 2018-02-25 PROCEDURE — 99285 EMERGENCY DEPT VISIT HI MDM: CPT | Mod: 25

## 2018-02-25 PROCEDURE — 76705 ECHO EXAM OF ABDOMEN: CPT

## 2018-02-25 PROCEDURE — 96361 HYDRATE IV INFUSION ADD-ON: CPT

## 2018-02-25 PROCEDURE — 83690 ASSAY OF LIPASE: CPT | Performed by: EMERGENCY MEDICINE

## 2018-02-25 PROCEDURE — 96375 TX/PRO/DX INJ NEW DRUG ADDON: CPT

## 2018-02-25 PROCEDURE — 25000128 H RX IP 250 OP 636: Performed by: EMERGENCY MEDICINE

## 2018-02-25 PROCEDURE — 85025 COMPLETE CBC W/AUTO DIFF WBC: CPT | Performed by: EMERGENCY MEDICINE

## 2018-02-25 RX ORDER — ONDANSETRON 4 MG/1
4 TABLET, ORALLY DISINTEGRATING ORAL EVERY 8 HOURS PRN
Qty: 5 TABLET | Refills: 0 | Status: SHIPPED | OUTPATIENT
Start: 2018-02-25 | End: 2018-06-02

## 2018-02-25 RX ORDER — ONDANSETRON 2 MG/ML
4 INJECTION INTRAMUSCULAR; INTRAVENOUS ONCE
Status: COMPLETED | OUTPATIENT
Start: 2018-02-25 | End: 2018-02-25

## 2018-02-25 RX ORDER — KETOROLAC TROMETHAMINE 15 MG/ML
15 INJECTION, SOLUTION INTRAMUSCULAR; INTRAVENOUS ONCE
Status: COMPLETED | OUTPATIENT
Start: 2018-02-25 | End: 2018-02-25

## 2018-02-25 RX ADMIN — KETOROLAC TROMETHAMINE 15 MG: 15 INJECTION, SOLUTION INTRAMUSCULAR; INTRAVENOUS at 06:45

## 2018-02-25 RX ADMIN — ONDANSETRON 4 MG: 2 INJECTION INTRAMUSCULAR; INTRAVENOUS at 06:42

## 2018-02-25 RX ADMIN — SODIUM CHLORIDE, POTASSIUM CHLORIDE, SODIUM LACTATE AND CALCIUM CHLORIDE 1000 ML: 600; 310; 30; 20 INJECTION, SOLUTION INTRAVENOUS at 06:48

## 2018-02-25 ASSESSMENT — ENCOUNTER SYMPTOMS
BLOOD IN STOOL: 0
NAUSEA: 1
DIARRHEA: 0
ABDOMINAL PAIN: 1
FEVER: 0
VOMITING: 1

## 2018-02-25 NOTE — ED PROVIDER NOTES
"  History     Chief Complaint:  Abdominal pain     HPI   Aly Solitario Jr. is a 32 year old male with history of alcohol abuse and alcohol withdrawal seizures who presents for evaluation of abdominal pain. The patient states he was been binge drinking over the last four days and his last drink was at 0100. Here, he complains of epigastric abdominal pain with associated nausea and vomiting. He has had previous right upper quadrant abdomen surgery as a child but is unsure what the procedure was. He denies fever, bloody stools, diarrhea, or any additional symptoms.     Allergies:  No known drug allergies     Medications:    The patient is currently on no regular medications.     Past Medical History:    Alcohol abuse  Alcohol withdrawal seizure    Past Surgical History:    Soft tissue surgery arm     Family History:    History reviewed. No pertinent family history.      Social History:  Smoking status: Everyday smoker  Alcohol use: Yes   Marital Status:  Single      Review of Systems   Constitutional: Negative for fever.   Gastrointestinal: Positive for abdominal pain, nausea and vomiting. Negative for blood in stool and diarrhea.   All other systems reviewed and are negative.    Physical Exam   First Vitals:   BP Temp Temp src Pulse Resp SpO2 Height Weight   02/25/18 0708 (!) 131/96 - - - - 98 % - -   02/25/18 0628 (!) 129/98 98.7  F (37.1  C) Oral 102 20 98 % 1.702 m (5' 7\") 63.5 kg (140 lb)      Physical Exam   HENT:   Right Ear: External ear normal.   Left Ear: External ear normal.   Nose: Nose normal.   Eyes: Right eye exhibits no discharge. Left eye exhibits no discharge. No scleral icterus.   Cardiovascular: Intact distal pulses.    Pulmonary/Chest: Effort normal.   Speaking in full sentences   Abdominal: He exhibits no distension. There is no tenderness (No significant localizing tenderness patient subjectively complains of pain in the epigastrium).   Musculoskeletal:   No peripheral edema    Neurological: " He is alert.   Alert, intoxicated but stable gait able to provide history    No gross motor or sensory deficits   Skin: Skin is warm.   Psychiatric: He has a normal mood and affect. Judgment normal.   Nursing note and vitals reviewed.        Emergency Department Course   Imaging:  Radiographic findings were communicated with the patient who voiced understanding of the findings.    Ultrasound abdomen limited:  No acute abnormality. Normal gallbladder without  cholelithiasis or evidence for cholecystitis.   As read by Radiology.    Laboratory:  CBC: WNL (WBC 8.0, HGB 16.0, )   CMP: calcium 8.3(L), AST 61(H), o/w WNL (Creatinine 0.71)  0638 Lipase: 206  0638 Alcohol level blood: 0.30(H)     Interventions:  0642 Toradol 15 mg IV  0645 Zofran 4mg IV injection    0648 Lactated ringer 1L IV bolus    Emergency Department Course:  Past medical records, nursing notes, and vitals reviewed.  0637: I performed an exam of the patient and obtained history, as documented above. IV started, labs were drawn, and interventions given as above.   The patient was sent for an ultrasound while in the emergency department, findings above.  0737: I rechecked the patient. Findings and plan explained to the Patient. Patient discharged home with instructions regarding supportive care, medications, and reasons to return. The importance of close follow-up was reviewed.        Impression & Plan    Medical Decision Making:  Aly Ashleyjarocho Be is a 32 year old male with a history of alcohol abuse here with pain in the epigastrium no significant tenderness on examination here concerned for alcoholic gastritis pancreatitis less likely obstructing hepatobiliary process bowel obstruction vascular catastrophe appendicitis referred cardiopulmonary etiology.  He improved with the above interventions here in the emergency department will discharge home with symptomatic treatment recommended he stop drinking alcohol    Diagnosis:    ICD-10-CM    1.  Abdominal pain, epigastric R10.13    2. ETOH abuse F10.10    3. Non-intractable vomiting with nausea, unspecified vomiting type R11.2      Disposition:  discharged to home with Zofran-ODT and Zantac    Discharge Medications:  New Prescriptions    ONDANSETRON (ZOFRAN-ODT) 4 MG ODT TAB    Take 1 tablet (4 mg) by mouth every 8 hours as needed for nausea    RANITIDINE (ZANTAC) 150 MG TABLET    Take 1 tablet (150 mg) by mouth 2 times daily for 10 days     Homero Gilman  2/25/2018   [facility] EMERGENCY DEPARTMENT  I, Homero Gilman, am serving as a scribe at 6:33 AM on 2/25/2018 to document services personally performed by Chema Strogn MD based on my observations and the provider's statements to me.       Chema Strong MD  02/25/18 0741

## 2018-02-25 NOTE — ED NOTES
Bed: ED14  Expected date: 2/25/18  Expected time: 6:16 AM  Means of arrival: Ambulance  Comments:  Funmilayo Arias

## 2018-02-25 NOTE — ED AVS SNAPSHOT
[facility] Emergency Department    [address]    [facility] [address]    Phone:  [phone]    Fax:  [phone]                                       Aly Solitario Jr.   MRN: [id number]    Department:  [facility] Emergency Department   Date of Visit:  2/25/2018           After Visit Summary Signature Page     I have received my discharge instructions, and my questions have been answered. I have discussed any challenges I see with this plan with the nurse or doctor.    ..........................................................................................................................................  Patient/Patient Representative Signature      ..........................................................................................................................................  Patient Representative Print Name and Relationship to Patient    ..................................................               ................................................  Date                                            Time    ..........................................................................................................................................  Reviewed by Signature/Title    ...................................................              ..............................................  Date                                                            Time

## 2018-02-25 NOTE — ED AVS SNAPSHOT
[facility] Emergency Department    [address]    [facility] [address]    Phone:  [phone]    Fax:  [phone]                                       Aly Solitario Jr.   MRN: [id number]    Department:  [facility] Emergency Department   Date of Visit:  2/25/2018           Patient Information     Date Of Birth          1985        Your diagnoses for this visit were:     Abdominal pain, epigastric     ETOH abuse     Non-intractable vomiting with nausea, unspecified vomiting type        You were seen by Chema Strong MD.        Discharge Instructions       Please make an appointment to follow up with your primary care provider in 3-5 days if not improving.      Discharge Instructions  Abdominal Pain    Abdominal pain (belly pain) can be caused by many things. Your evaluation today does not show the exact cause for your pain. Your provider today has decided that it is unlikely your pain is due to a life threatening problem, or a problem requiring surgery or hospital admission. Sometimes those problems cannot be found right away, so it is very important that you follow up as directed.  Sometimes only the changes which occur over time allow the cause of your pain to be found.    Generally, every Emergency Department visit should have a follow-up clinic visit with either a primary or a specialty clinic/provider. Please follow-up as instructed by your emergency provider today. With abdominal pain, we often recommend very close follow-up, such as the following day.    ADULTS:  Return to the Emergency Department right away if:      You get an oral temperature above 102oF or as directed by your provider.    You have blood in your stools. This may be bright red or appear as black, tarry stools.      You keep vomiting (throwing up) or cannot drink liquids.    You see blood when you vomit.     You cannot have a bowel movement or you cannot pass gas.    Your stomach  gets bloated or bigger.    Your skin or the whites of your eyes look yellow.    You faint.    You have bloody, frequent or painful urination (peeing).    You have new symptoms or anything that worries you.    CHILDREN:  Return to the Emergency Department right away if your child has any of the above-listed symptoms or the following:      Pushes your hand away or screams/cries when his/her belly is touched.    You notice your child is very fussy or weak.    Your child is very tired and is too tired to eat or drink.    Your child is dehydrated.  Signs of dehydration can be:  o Significant change in the amount of wet diapers/urine.  o Your infant or child starts to have dry mouth and lips, or no saliva (spit) or tears.    PREGNANT WOMEN:  Return to the Emergency Department right away if you have any of the above-listed symptoms or the following:      You have bleeding, leaking fluid or passing tissue from the vagina.    You have worse pain or cramping, or pain in your shoulder or back.    You have vomiting that will not stop.    You have a temperature of 100oF or more.    Your baby is not moving as much as usual.    You faint.    You get a bad headache with or without eye problems and abdominal pain.    You have a seizure.    You have unusual discharge from your vagina and abdominal pain.    Abdominal pain is pretty common during pregnancy.  Your pain may or may not be related to your pregnancy. You should follow-up closely with your OB provider so they can evaluate you and your baby.  Until you follow-up with your regular provider, do the following:       Avoid sex and do not put anything in your vagina.    Drink clear fluids.    Only take medications approved by your provider.    MORE INFORMATION:    Appendicitis:  A possible cause of abdominal pain in any person who still has their appendix is acute appendicitis. Appendicitis is often hard to diagnose.  Testing does not always rule out early appendicitis or other  "causes of abdominal pain. Close follow-up with your provider and re-evaluations may be needed to figure out the reason for your abdominal pain.    Follow-up:  It is very important that you make an appointment with your clinic and go to the appointment.  If you do not follow-up with your primary provider, it may result in missing an important development which could result in permanent injury or disability and/or lasting pain.  If there is any problem keeping your appointment, call your provider or return to the Emergency Department.    Medications:  Take your medications as directed by your provider today.  Before using over-the-counter medications, ask your provider and make sure to take the medications as directed.  If you have any questions about medications, ask your provider.    Diet:  Resume your normal diet as much as possible, but do not eat fried, fatty or spicy foods while you have pain.  Do not drink alcohol or have caffeine.  Do not smoke tobacco.    Probiotics: If you have been given an antibiotic, you may want to also take a probiotic pill or eat yogurt with live cultures. Probiotics have \"good bacteria\" to help your intestines stay healthy. Studies have shown that probiotics help prevent diarrhea (loose stools) and other intestine problems (including C. diff infection) when you take antibiotics. You can buy these without a prescription in the pharmacy section of the store.     If you were given a prescription for medicine here today, be sure to read all of the information (including the package insert) that comes with your prescription.  This will include important information about the medicine, its side effects, and any warnings that you need to know about.  The pharmacist who fills the prescription can provide more information and answer questions you may have about the medicine.  If you have questions or concerns that the pharmacist cannot address, please call or return to the Emergency " "Department.       Remember that you can always come back to the Emergency Department if you are not able to see your regular provider in the amount of time listed above, if you get any new symptoms, or if there is anything that worries you.        Alcohol Abuse  Alcoholic drinks are harmful when you have too many of them. There is no set number of drinks that defines too much. Drinking that disrupts your life or your health is called alcohol abuse. Alcohol abuse can hurt your relationships with others. You may lose friends, a spouse, or even your job. You may be abusing alcohol if any of the following are true for you:    Duties at home or with  suffer because of drinking.    Duties at work or in school suffer because of drinking.    You have missed work or school because of drinking.    You use alcohol while driving or operating machinery.    You have legal problems such as arrests due to drinking.    You keep drinking even though it causes serious problems in your life.  Health effects  Alcohol abuse causes health problems. Sometimes this can happen after only drinking a  little.\" There is no set number of drinks or amount of alcohol that defines too much. The more you drink at one time, and the more often you drink determine both the short-term and long-term health effects. It affects all parts of your body and your health, including your:    Brain. Alcohol is a central nervous system depressant. It can damage parts of the brain that affect your balance, memory, thinking, and emotions. It can cause memory loss, blackouts, depression, agitation, sleep cycle changes, and seizures. These changes may or may not be reversible.    Heart and vascular system. Alcohol affects multiple areas. It can damage heart muscle causing cardiomyopathy, which is a weakening and stretching of the heart muscle. This can lead to trouble breathing, an irregular heartbeat, atrial fibrillation, leg swelling, and heart failure. " Alcohol use makes the blood vessels stiffen causing high blood pressure. All of these problems increase your risk of having heart attacks or strokes.    Liver. Alcohol causes fat to build up in the liver, affecting its normal function. This increases the risk for hepatitis, leading to abdominal pain, appetite loss, jaundice, bleeding problems, liver fibrosis, and cirrhosis. This, in turn, can affect your ability to fight off infections, and can cause diabetes. The liver changes prevent it from removing toxins in your blood that can cause encephalopathy, which may show with confusion, altered level of consciousness, personality changes, memory loss, seizures, coma, and death.    Pancreas. Alcohol can cause inflammation of the pancreas, or pancreatitis. This can cause abdominal pain, fever, and diabetes.    Immune system. Alcohol weakens your immune system in a number of ways. It suppresses your immune system making it harder to fight infections and colds. It also increases the chance of getting pneumonia and tuberculosis.    Cancer. Alcohol is a risk factor for developing cancer of the mouth, esophagus, pharynx, larynx, liver, and breast.    Sexual function. Alcohol can lead to sexual problems.  Home care  The following guidelines will help you deal with alcohol abuse:    Admit you have a problem with alcohol.    Ask for help from your healthcare provider and trusted family members or close friends.    Get help from people trained in dealing with alcohol abuse. This may be individual counseling or group therapy, or it may be a supervised alcohol treatment program.    Join a self-help group for alcohol abuse such as Alcoholics Anonymous (AA).    Avoid people who abuse alcohol or tempt you to drink.  Follow-up care  Follow up as advised by the healthcare provider, or as advised. Contact these groups to get help:    Alcoholics Anonymous (AA): Go to www.aa.org or check the phone book for meetings near you.    National  Alcohol and Substance Abuse Information Center ([facility]): 593.608.8297 www.addictioncareMuckRock.[employer]    National [place] on Alcoholism and Drug Dependence (NCADD): 396-ZJY-SAAV (933-8371) www.ncadd.org  Call 911  Call 911 if any of these occur:    Trouble breathing or slow irregular breathing    Chest pain    Sudden weakness on one side of your body or sudden trouble speaking    Heavy bleeding or vomiting blood    Very drowsy or trouble awakening    Fainting or loss of consciousness    Rapid heart rate    Seizure  When to seek medical care  Call your healthcare provider right away if any of these occur:     Confusion    Hallucinations (seeing, hearing, or feeling things that aren t there)    Pain in your upper abdomen that gets worse    Repeated vomiting or black or tarry stools    Severe shakiness  Date Last Reviewed: 6/1/2016 2000-2017 [employer]. [address] [place] [address]. All rights reserved. This information is not intended as a substitute for professional medical care. Always follow your healthcare professional's instructions.          24 Hour Appointment Hotline       To make an appointment at any [facility], call [phone] ([phone]). If you don't have a family doctor or clinic, we will help you find one. [facility] clinics are conveniently located to serve the needs of you and your family.             Review of your medicines      START taking        Dose / Directions Last dose taken    ondansetron 4 MG ODT tab   Commonly known as:  ZOFRAN-ODT   Dose:  4 mg   Quantity:  5 tablet        Take 1 tablet (4 mg) by mouth every 8 hours as needed for nausea   Refills:  0        ranitidine 150 MG tablet   Commonly known as:  ZANTAC   Dose:  150 mg   Quantity:  20 tablet        Take 1 tablet (150 mg) by mouth 2 times daily for 10 days   Refills:  0                Prescriptions were sent or printed at these locations (2 Prescriptions)                   Other Prescriptions                 Printed at Department/Unit printer (2 of 2)         ondansetron (ZOFRAN-ODT) 4 MG ODT tab               ranitidine (ZANTAC) 150 MG tablet                Procedures and tests performed during your visit     Alcohol level blood    CBC with platelets differential    Comprehensive metabolic panel    Lipase    US Abdomen Limited      Orders Needing Specimen Collection     None      Pending Results     No orders found from 2/23/2018 to 2/26/2018.            Pending Culture Results     No orders found from 2/23/2018 to 2/26/2018.            Pending Results Instructions     If you had any lab results that were not finalized at the time of your Discharge, you can call the ED Lab Result RN at [phone]. You will be contacted by this team for any positive Lab results or changes in treatment. The nurses are available 7 days a week from 10A to 6:30P.  You can leave a message 24 hours per day and they will return your call.        Test Results From Your Hospital Stay        2/25/2018  6:47 AM      Component Results     Component Value Ref Range & Units Status    WBC 8.0 4.0 - 11.0 10e9/L Final    RBC Count 5.42 4.4 - 5.9 10e12/L Final    Hemoglobin 16.0 13.3 - 17.7 g/dL Final    Hematocrit 46.2 40.0 - 53.0 % Final    MCV 85 78 - 100 fl Final    MCH 29.5 26.5 - 33.0 pg Final    MCHC 34.6 31.5 - 36.5 g/dL Final    RDW 12.8 10.0 - 15.0 % Final    Platelet Count 322 150 - 450 10e9/L Final    Diff Method Automated Method  Final    % Neutrophils 61.4 % Final    % Lymphocytes 30.5 % Final    % Monocytes 5.6 % Final    % Eosinophils 1.3 % Final    % Basophils 0.8 % Final    % Immature Granulocytes 0.4 % Final    Nucleated RBCs 0 0 /100 Final    Absolute Neutrophil 4.9 1.6 - 8.3 10e9/L Final    Absolute Lymphocytes 2.4 0.8 - 5.3 10e9/L Final    Absolute Monocytes 0.5 0.0 - 1.3 10e9/L Final    Absolute Eosinophils 0.1 0.0 - 0.7 10e9/L Final    Absolute Basophils 0.1 0.0 - 0.2 10e9/L Final    Abs Immature Granulocytes 0.0  0 - 0.4 10e9/L Final    Absolute Nucleated RBC 0.0  Final         2/25/2018  7:04 AM      Component Results     Component Value Ref Range & Units Status    Sodium 142 133 - 144 mmol/L Final    Potassium 3.6 3.4 - 5.3 mmol/L Final    Chloride 107 94 - 109 mmol/L Final    Carbon Dioxide 26 20 - 32 mmol/L Final    Anion Gap 9 3 - 14 mmol/L Final    Glucose 98 70 - 99 mg/dL Final    Urea Nitrogen 10 7 - 30 mg/dL Final    Creatinine 0.71 0.66 - 1.25 mg/dL Final    GFR Estimate >90 >60 mL/min/1.7m2 Final    Non  GFR Calc    GFR Estimate If Black >90 >60 mL/min/1.7m2 Final    African American GFR Calc    Calcium 8.3 (L) 8.5 - 10.1 mg/dL Final    Bilirubin Total 0.5 0.2 - 1.3 mg/dL Final    Albumin 3.7 3.4 - 5.0 g/dL Final    Protein Total 8.0 6.8 - 8.8 g/dL Final    Alkaline Phosphatase 95 40 - 150 U/L Final    ALT 39 0 - 70 U/L Final    AST 61 (H) 0 - 45 U/L Final         2/25/2018  7:04 AM      Component Results     Component Value Ref Range & Units Status    Lipase 206 73 - 393 U/L Final         2/25/2018  7:20 AM      Component Results     Component Value Ref Range & Units Status    Ethanol g/dL 0.30 (H) <0.01 g/dL Final    Specimen run with a dilution         2/25/2018  7:18 AM      Narrative     RIGHT UPPER QUADRANT ULTRASOUND 2/25/2018 7:12 AM    HISTORY:  Right upper quadrant pain    COMPARISON: 1/12/2016 CT abdomen and pelvis    FINDINGS:    Gallbladder:  Normal with no cholelithiasis, wall thickening or focal  tenderness.      Bile ducts:   CHD is normal diameter.  No intrahepatic biliary  dilatation.    Liver:   Normal.     Pancreas:  Normal where visualized, partially obscured.    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 No hydronephrosis.    Visualized short segment proximal aorta and proximal IVC grossly  normal.        Impression     IMPRESSION:  No acute abnormality. Normal gallbladder without  cholelithiasis or evidence for cholecystitis.    LUISA  MD TIMOTHY                Clinical Quality Measure: Blood Pressure Screening     Your blood pressure was checked while you were in the emergency department today. The last reading we obtained was  BP: 113/77 . Please read the guidelines below about what these numbers mean and what you should do about them.  If your systolic blood pressure (the top number) is less than 120 and your diastolic blood pressure (the bottom number) is less than 80, then your blood pressure is normal. There is nothing more that you need to do about it.  If your systolic blood pressure (the top number) is 120-139 or your diastolic blood pressure (the bottom number) is 80-89, your blood pressure may be higher than it should be. You should have your blood pressure rechecked within a year by a primary care provider.  If your systolic blood pressure (the top number) is 140 or greater or your diastolic blood pressure (the bottom number) is 90 or greater, you may have high blood pressure. High blood pressure is treatable, but if left untreated over time it can put you at risk for heart attack, stroke, or kidney failure. You should have your blood pressure rechecked by a primary care provider within the next 4 weeks.  If your provider in the emergency department today gave you specific instructions to follow-up with your doctor or provider even sooner than that, you should follow that instruction and not wait for up to 4 weeks for your follow-up visit.        Thank you for choosing [facility]       Thank you for choosing [facility] for your care. Our goal is always to provide you with excellent care. Hearing back from our patients is one way we can continue to improve our services. Please take a few minutes to complete the written survey that you may receive in the mail after you visit with us. Thank you!        [employer] Information     [employer] lets you send messages to your doctor, view your test results, renew your prescriptions, schedule appointments  "and more. To sign up, go to [email or web address] . Click on \"Log in\" on the left side of the screen, which will take you to the Welcome page. Then click on \"Sign up Now\" on the right side of the page.     You will be asked to enter the access code listed below, as well as some personal information. Please follow the directions to create your username and password.     Your access code is: [email or web address]  Expires: 2018  2:56 AM     Your access code will  in 90 days. If you need help or a new code, please call your [facility] clinic or [phone].        Care EveryWhere ID     This is your Care EveryWhere ID. This could be used by other organizations to access your [facility] medical records  [id number]        Equal Access to Services     PHOENIX CHAN : Clementine Johnson, norman abebe, mrery smart, elena santa. So [facility] [phone].    ATENCIÓN: Si habla español, tiene a restrepo disposición servicios gratuitos de asistencia lingüística. Llame al [phone].    We comply with applicable federal civil rights laws and [place] laws. We do not discriminate on the basis of race, color, national origin, age, disability, sex, sexual orientation, or gender identity.            After Visit Summary       This is your record. Keep this with you and show to your community pharmacist(s) and doctor(s) at your next visit.                  "

## 2018-02-25 NOTE — DISCHARGE INSTRUCTIONS
Please make an appointment to follow up with your primary care provider in 3-5 days if not improving.      Discharge Instructions  Abdominal Pain    Abdominal pain (belly pain) can be caused by many things. Your evaluation today does not show the exact cause for your pain. Your provider today has decided that it is unlikely your pain is due to a life threatening problem, or a problem requiring surgery or hospital admission. Sometimes those problems cannot be found right away, so it is very important that you follow up as directed.  Sometimes only the changes which occur over time allow the cause of your pain to be found.    Generally, every Emergency Department visit should have a follow-up clinic visit with either a primary or a specialty clinic/provider. Please follow-up as instructed by your emergency provider today. With abdominal pain, we often recommend very close follow-up, such as the following day.    ADULTS:  Return to the Emergency Department right away if:      You get an oral temperature above 102oF or as directed by your provider.    You have blood in your stools. This may be bright red or appear as black, tarry stools.      You keep vomiting (throwing up) or cannot drink liquids.    You see blood when you vomit.     You cannot have a bowel movement or you cannot pass gas.    Your stomach gets bloated or bigger.    Your skin or the whites of your eyes look yellow.    You faint.    You have bloody, frequent or painful urination (peeing).    You have new symptoms or anything that worries you.    CHILDREN:  Return to the Emergency Department right away if your child has any of the above-listed symptoms or the following:      Pushes your hand away or screams/cries when his/her belly is touched.    You notice your child is very fussy or weak.    Your child is very tired and is too tired to eat or drink.    Your child is dehydrated.  Signs of dehydration can be:  o Significant change in the amount of wet  diapers/urine.  o Your infant or child starts to have dry mouth and lips, or no saliva (spit) or tears.    PREGNANT WOMEN:  Return to the Emergency Department right away if you have any of the above-listed symptoms or the following:      You have bleeding, leaking fluid or passing tissue from the vagina.    You have worse pain or cramping, or pain in your shoulder or back.    You have vomiting that will not stop.    You have a temperature of 100oF or more.    Your baby is not moving as much as usual.    You faint.    You get a bad headache with or without eye problems and abdominal pain.    You have a seizure.    You have unusual discharge from your vagina and abdominal pain.    Abdominal pain is pretty common during pregnancy.  Your pain may or may not be related to your pregnancy. You should follow-up closely with your OB provider so they can evaluate you and your baby.  Until you follow-up with your regular provider, do the following:       Avoid sex and do not put anything in your vagina.    Drink clear fluids.    Only take medications approved by your provider.    MORE INFORMATION:    Appendicitis:  A possible cause of abdominal pain in any person who still has their appendix is acute appendicitis. Appendicitis is often hard to diagnose.  Testing does not always rule out early appendicitis or other causes of abdominal pain. Close follow-up with your provider and re-evaluations may be needed to figure out the reason for your abdominal pain.    Follow-up:  It is very important that you make an appointment with your clinic and go to the appointment.  If you do not follow-up with your primary provider, it may result in missing an important development which could result in permanent injury or disability and/or lasting pain.  If there is any problem keeping your appointment, call your provider or return to the Emergency Department.    Medications:  Take your medications as directed by your provider today.  Before  "using over-the-counter medications, ask your provider and make sure to take the medications as directed.  If you have any questions about medications, ask your provider.    Diet:  Resume your normal diet as much as possible, but do not eat fried, fatty or spicy foods while you have pain.  Do not drink alcohol or have caffeine.  Do not smoke tobacco.    Probiotics: If you have been given an antibiotic, you may want to also take a probiotic pill or eat yogurt with live cultures. Probiotics have \"good bacteria\" to help your intestines stay healthy. Studies have shown that probiotics help prevent diarrhea (loose stools) and other intestine problems (including C. diff infection) when you take antibiotics. You can buy these without a prescription in the pharmacy section of the store.     If you were given a prescription for medicine here today, be sure to read all of the information (including the package insert) that comes with your prescription.  This will include important information about the medicine, its side effects, and any warnings that you need to know about.  The pharmacist who fills the prescription can provide more information and answer questions you may have about the medicine.  If you have questions or concerns that the pharmacist cannot address, please call or return to the Emergency Department.       Remember that you can always come back to the Emergency Department if you are not able to see your regular provider in the amount of time listed above, if you get any new symptoms, or if there is anything that worries you.        Alcohol Abuse  Alcoholic drinks are harmful when you have too many of them. There is no set number of drinks that defines too much. Drinking that disrupts your life or your health is called alcohol abuse. Alcohol abuse can hurt your relationships with others. You may lose friends, a spouse, or even your job. You may be abusing alcohol if any of the following are true for " "you:    Duties at home or with  suffer because of drinking.    Duties at work or in school suffer because of drinking.    You have missed work or school because of drinking.    You use alcohol while driving or operating machinery.    You have legal problems such as arrests due to drinking.    You keep drinking even though it causes serious problems in your life.  Health effects  Alcohol abuse causes health problems. Sometimes this can happen after only drinking a  little.\" There is no set number of drinks or amount of alcohol that defines too much. The more you drink at one time, and the more often you drink determine both the short-term and long-term health effects. It affects all parts of your body and your health, including your:    Brain. Alcohol is a central nervous system depressant. It can damage parts of the brain that affect your balance, memory, thinking, and emotions. It can cause memory loss, blackouts, depression, agitation, sleep cycle changes, and seizures. These changes may or may not be reversible.    Heart and vascular system. Alcohol affects multiple areas. It can damage heart muscle causing cardiomyopathy, which is a weakening and stretching of the heart muscle. This can lead to trouble breathing, an irregular heartbeat, atrial fibrillation, leg swelling, and heart failure. Alcohol use makes the blood vessels stiffen causing high blood pressure. All of these problems increase your risk of having heart attacks or strokes.    Liver. Alcohol causes fat to build up in the liver, affecting its normal function. This increases the risk for hepatitis, leading to abdominal pain, appetite loss, jaundice, bleeding problems, liver fibrosis, and cirrhosis. This, in turn, can affect your ability to fight off infections, and can cause diabetes. The liver changes prevent it from removing toxins in your blood that can cause encephalopathy, which may show with confusion, altered level of consciousness, " personality changes, memory loss, seizures, coma, and death.    Pancreas. Alcohol can cause inflammation of the pancreas, or pancreatitis. This can cause abdominal pain, fever, and diabetes.    Immune system. Alcohol weakens your immune system in a number of ways. It suppresses your immune system making it harder to fight infections and colds. It also increases the chance of getting pneumonia and tuberculosis.    Cancer. Alcohol is a risk factor for developing cancer of the mouth, esophagus, pharynx, larynx, liver, and breast.    Sexual function. Alcohol can lead to sexual problems.  Home care  The following guidelines will help you deal with alcohol abuse:    Admit you have a problem with alcohol.    Ask for help from your healthcare provider and trusted family members or close friends.    Get help from people trained in dealing with alcohol abuse. This may be individual counseling or group therapy, or it may be a supervised alcohol treatment program.    Join a self-help group for alcohol abuse such as Alcoholics Anonymous (AA).    Avoid people who abuse alcohol or tempt you to drink.  Follow-up care  Follow up as advised by the healthcare provider, or as advised. Contact these groups to get help:    Alcoholics Anonymous (AA): Go to www.aa.org or check the phone book for meetings near you.    National Alcohol and Substance Abuse Information Center (NASAIC): 978.773.8888 www.addictioncareE[employer] Financial Servicesions.com    National [place] on Alcoholism and Drug Dependence (NCADD): 057-SMA-ZNYT (332-2081) www.ncadd.org  Call 911  Call 911 if any of these occur:    Trouble breathing or slow irregular breathing    Chest pain    Sudden weakness on one side of your body or sudden trouble speaking    Heavy bleeding or vomiting blood    Very drowsy or trouble awakening    Fainting or loss of consciousness    Rapid heart rate    Seizure  When to seek medical care  Call your healthcare provider right away if any of these  occur:     Confusion    Hallucinations (seeing, hearing, or feeling things that aren t there)    Pain in your upper abdomen that gets worse    Repeated vomiting or black or tarry stools    Severe shakiness  Date Last Reviewed: 6/1/2016 2000-2017 [employer]. [address] [place] [id number]. All rights reserved. This information is not intended as a substitute for professional medical care. Always follow your healthcare professional's instructions.

## 2018-02-25 NOTE — ED NOTES
Pt to ER with c/o binge drinking over the last four days , stopp same thinged drinking around 0100, pt has hx of ETOH abuse and was just in or ER a few days ago for the same thing

## 2018-06-02 ENCOUNTER — HOSPITAL ENCOUNTER (EMERGENCY)
Facility: CLINIC | Age: 33
Discharge: OTHER INSTITUTION | End: 2018-06-03
Attending: EMERGENCY MEDICINE | Admitting: EMERGENCY MEDICINE

## 2018-06-02 DIAGNOSIS — F10.929 ALCOHOLIC INTOXICATION WITH COMPLICATION (H): ICD-10-CM

## 2018-06-02 DIAGNOSIS — E87.6 HYPOKALEMIA: ICD-10-CM

## 2018-06-02 LAB
ALBUMIN SERPL-MCNC: 4.1 G/DL (ref 3.4–5)
ALP SERPL-CCNC: 88 U/L (ref 40–150)
ALT SERPL W P-5'-P-CCNC: 48 U/L (ref 0–70)
ANION GAP SERPL CALCULATED.3IONS-SCNC: 10 MMOL/L (ref 3–14)
APAP SERPL-MCNC: <2 MG/L (ref 10–20)
AST SERPL W P-5'-P-CCNC: 54 U/L (ref 0–45)
BASOPHILS # BLD AUTO: 0.1 10E9/L (ref 0–0.2)
BASOPHILS NFR BLD AUTO: 0.9 %
BILIRUB SERPL-MCNC: 1 MG/DL (ref 0.2–1.3)
BUN SERPL-MCNC: 7 MG/DL (ref 7–30)
CALCIUM SERPL-MCNC: 8 MG/DL (ref 8.5–10.1)
CHLORIDE SERPL-SCNC: 103 MMOL/L (ref 94–109)
CO2 SERPL-SCNC: 28 MMOL/L (ref 20–32)
CREAT SERPL-MCNC: 0.64 MG/DL (ref 0.66–1.25)
DIFFERENTIAL METHOD BLD: NORMAL
EOSINOPHIL # BLD AUTO: 0 10E9/L (ref 0–0.7)
EOSINOPHIL NFR BLD AUTO: 0.1 %
ERYTHROCYTE [DISTWIDTH] IN BLOOD BY AUTOMATED COUNT: 11.7 % (ref 10–15)
GFR SERPL CREATININE-BSD FRML MDRD: >90 ML/MIN/1.7M2
GLUCOSE SERPL-MCNC: 122 MG/DL (ref 70–99)
HCT VFR BLD AUTO: 46.8 % (ref 40–53)
HGB BLD-MCNC: 16.7 G/DL (ref 13.3–17.7)
IMM GRANULOCYTES # BLD: 0 10E9/L (ref 0–0.4)
IMM GRANULOCYTES NFR BLD: 0.2 %
LIPASE SERPL-CCNC: 125 U/L (ref 73–393)
LYMPHOCYTES # BLD AUTO: 1.9 10E9/L (ref 0.8–5.3)
LYMPHOCYTES NFR BLD AUTO: 20.6 %
MCH RBC QN AUTO: 29.4 PG (ref 26.5–33)
MCHC RBC AUTO-ENTMCNC: 35.7 G/DL (ref 31.5–36.5)
MCV RBC AUTO: 82 FL (ref 78–100)
MONOCYTES # BLD AUTO: 0.4 10E9/L (ref 0–1.3)
MONOCYTES NFR BLD AUTO: 4 %
NEUTROPHILS # BLD AUTO: 6.8 10E9/L (ref 1.6–8.3)
NEUTROPHILS NFR BLD AUTO: 74.2 %
NRBC # BLD AUTO: 0 10*3/UL
NRBC BLD AUTO-RTO: 0 /100
PLATELET # BLD AUTO: 279 10E9/L (ref 150–450)
POTASSIUM SERPL-SCNC: 3 MMOL/L (ref 3.4–5.3)
PROT SERPL-MCNC: 8 G/DL (ref 6.8–8.8)
RBC # BLD AUTO: 5.68 10E12/L (ref 4.4–5.9)
SODIUM SERPL-SCNC: 141 MMOL/L (ref 133–144)
WBC # BLD AUTO: 9.1 10E9/L (ref 4–11)

## 2018-06-02 PROCEDURE — 80320 DRUG SCREEN QUANTALCOHOLS: CPT | Performed by: EMERGENCY MEDICINE

## 2018-06-02 PROCEDURE — 96375 TX/PRO/DX INJ NEW DRUG ADDON: CPT

## 2018-06-02 PROCEDURE — 99285 EMERGENCY DEPT VISIT HI MDM: CPT | Mod: 25

## 2018-06-02 PROCEDURE — 80053 COMPREHEN METABOLIC PANEL: CPT | Performed by: EMERGENCY MEDICINE

## 2018-06-02 PROCEDURE — 83690 ASSAY OF LIPASE: CPT | Performed by: EMERGENCY MEDICINE

## 2018-06-02 PROCEDURE — 85025 COMPLETE CBC W/AUTO DIFF WBC: CPT | Performed by: EMERGENCY MEDICINE

## 2018-06-02 PROCEDURE — 25000132 ZZH RX MED GY IP 250 OP 250 PS 637: Performed by: EMERGENCY MEDICINE

## 2018-06-02 PROCEDURE — 80329 ANALGESICS NON-OPIOID 1 OR 2: CPT | Performed by: EMERGENCY MEDICINE

## 2018-06-02 PROCEDURE — 96361 HYDRATE IV INFUSION ADD-ON: CPT

## 2018-06-02 PROCEDURE — 93005 ELECTROCARDIOGRAM TRACING: CPT

## 2018-06-02 PROCEDURE — 25000128 H RX IP 250 OP 636: Performed by: EMERGENCY MEDICINE

## 2018-06-02 RX ORDER — LORAZEPAM 2 MG/ML
1 INJECTION INTRAMUSCULAR ONCE
Status: COMPLETED | OUTPATIENT
Start: 2018-06-02 | End: 2018-06-02

## 2018-06-02 RX ORDER — POTASSIUM CHLORIDE 1500 MG/1
20 TABLET, EXTENDED RELEASE ORAL ONCE
Status: COMPLETED | OUTPATIENT
Start: 2018-06-02 | End: 2018-06-03

## 2018-06-02 RX ORDER — LANOLIN ALCOHOL/MO/W.PET/CERES
100 CREAM (GRAM) TOPICAL ONCE
Status: COMPLETED | OUTPATIENT
Start: 2018-06-02 | End: 2018-06-02

## 2018-06-02 RX ORDER — POTASSIUM CL/LIDO/0.9 % NACL 10MEQ/0.1L
10 INTRAVENOUS SOLUTION, PIGGYBACK (ML) INTRAVENOUS ONCE
Status: COMPLETED | OUTPATIENT
Start: 2018-06-02 | End: 2018-06-03

## 2018-06-02 RX ORDER — FOLIC ACID 1 MG/1
1 TABLET ORAL ONCE
Status: COMPLETED | OUTPATIENT
Start: 2018-06-02 | End: 2018-06-02

## 2018-06-02 RX ADMIN — LORAZEPAM 1 MG: 2 INJECTION INTRAMUSCULAR; INTRAVENOUS at 23:52

## 2018-06-02 RX ADMIN — Medication 100 MG: at 23:22

## 2018-06-02 RX ADMIN — SODIUM CHLORIDE, POTASSIUM CHLORIDE, SODIUM LACTATE AND CALCIUM CHLORIDE 1000 ML: 600; 310; 30; 20 INJECTION, SOLUTION INTRAVENOUS at 23:22

## 2018-06-02 RX ADMIN — FOLIC ACID 1 MG: 1 TABLET ORAL at 23:22

## 2018-06-02 RX ADMIN — LORAZEPAM 1 MG: 2 INJECTION INTRAMUSCULAR; INTRAVENOUS at 23:22

## 2018-06-02 ASSESSMENT — ENCOUNTER SYMPTOMS
TREMORS: 1
VOMITING: 0
DYSURIA: 0
DIARRHEA: 0
CONSTIPATION: 0
ABDOMINAL PAIN: 0

## 2018-06-02 NOTE — ED AVS SNAPSHOT
[facility] Emergency Department    [address]    [facility] [address]    Phone:  [phone]    Fax:  [phone]                                       Aly Solitario Jr.   MRN: [id number]    Department:  [facility] Emergency Department   Date of Visit:  6/2/2018           After Visit Summary Signature Page     I have received my discharge instructions, and my questions have been answered. I have discussed any challenges I see with this plan with the nurse or doctor.    ..........................................................................................................................................  Patient/Patient Representative Signature      ..........................................................................................................................................  Patient Representative Print Name and Relationship to Patient    ..................................................               ................................................  Date                                            Time    ..........................................................................................................................................  Reviewed by Signature/Title    ...................................................              ..............................................  Date                                                            Time

## 2018-06-02 NOTE — ED AVS SNAPSHOT
[facility] Emergency Department    [address] [place] [address]    Phone:  [phone]    Fax:  [phone]                                       Aly Solitario Jr.   MRN: [id number]    Department:  [facility] Emergency Department   Date of Visit:  6/2/2018           Patient Information     Date Of Birth          1985        Your diagnoses for this visit were:     Hypokalemia     Alcoholic intoxication with complication (H)        You were seen by Chema Strong MD.        Discharge Instructions         Please make an appointment to follow up with your primary care provider in 4-5 days .      Hypokalemia  Hypokalemia means a low level of potassium in the blood. This most often occurs in people who take water pills (diuretics). It can also occur because of severe vomiting or diarrhea. You may also have it if you take laxatives for long periods of time. It sometimes happens if you have low magnesium (hypomagnesemia). If you have this, your healthcare provider will treat the low magnesium first.  A mild case of hypokalemia usually causes no symptoms. It is only found with blood testing. More severe potassium loss causes overall weakness, muscle or abdominal cramps, rapid or irregular heartbeats (heart palpitations), low blood pressure, and muscle weakness.   Home care    Take any potassium supplements as prescribed.    Eat foods rich in potassium. The highest amount is found in avocado, baked potatoes, spinach, cantaloupe, cod, halibut, salmon, and scallops. White, red, or underwood beans are also very good sources. A modest amount of potassium is found in orange juice, bananas, carrots, and tomato juice.    If you take certain types of diuretics, you will also need to take potassium supplements. If you take a diuretic, discuss potassium supplements with your doctor.  Follow-up care  Follow up with your healthcare provider for a repeat blood test within the  next week, or as advised by our staff.  When to seek medical advice  Call your healthcare provider right away if any of the following occur:    Increased weakness, fatigue, or muscle cramps    Dizziness  Call 911  Call 911 if any of the following occur:    Irregular heartbeat, extra beats, or very fast heart rate    Loss of consciousness  Date Last Reviewed: 7/1/2017 2000-2017 [employer]. [address] [place] [address]. All rights reserved. This information is not intended as a substitute for professional medical care. Always follow your healthcare professional's instructions.          Alcohol Intoxication  Alcohol intoxication occurs when you drink alcohol faster than your liver can remove it from your system. The following facts are important to remember:    It can take 10 minutes or more to start to feel the effects of a drink, so you can easily get more intoxicated than you intended.    One drink may be more than 1 serving of alcohol. Depending on the drink, it can be 2 to 4 servings.    It takes about an hour for your body to metabolize (clear) 1 serving. If you have more than 1 drink, it can take a couple of hours or more.    Many things affect how drinks will affect you, including whether you ve eaten, how fast you drink, your size, how much you normally drink (or not), medicines you take, chronic diseases you have, and gender.  Signs and symptoms of alcohol poisoning  The following are signs and symptoms of alcohol poisoning:  Mild impairment    Reduced inhibitions    Slurred speech    Drowsiness    Decreased fine motor skills  Moderate impairment    Erratic behavior, aggression, depression    Impaired judgment    Confusion    Concentration difficulties    Coordination problems  Severe impairment    Vomiting    Seizures    Unconsciousness    Cold, clammy    Slow or irregular breathing    Hypothermia (low body temperature)    Coma  Health effects  Alcohol abuse causes health  "problems. Sometimes this can happen after only drinking a  little.\" There is no set number of drinks or amount of alcohol that defines too much. The more you drink at one time, and the more frequently you drink determine both the short-term and long-term health effects. It affects all parts of your body and your health, including your:    Brain. Alcohol is a central nervous system depressant. It can damage parts of the brain that affect your balance, memory, thinking, and emotions. It can cause memory loss, blackouts, depression, agitation, sleep cycle changes, and seizures. These changes may or may not be reversible.    Heart and vascular system. Alcohol affects multiple areas. It can damage heart muscle causing cardiomyopathy, which is a weakening and stretching of the heart muscle. This can lead to trouble breathing, an irregular heartbeat, atrial fibrillation, leg swelling, and heart failure. It makes the blood vessels stiffen causing hypertension (high blood pressure). All of these problems increase your risk of having heart attacks or strokes.    Liver. Alcohol causes fat to build up in the liver, affecting its normal function. This increases the risk for hepatitis, leading to abdominal pain, appetite loss, jaundice, bleeding problems, liver fibrosis, and cirrhosis. This in turn can affect your ability to fight off infections, and can cause diabetes. The liver changes prevent it from removing toxins in your blood that can cause encephalopathy. Signs of this are confusion, altered level of consciousness, personality changes, memory loss, seizures, coma, and death.    Pancreas. Alcohol can cause inflammation of the pancreas, or pancreatitis. This can cause pain in your abdomen, fever, and diabetes.    Immune system. Alcohol weakens your immune system in a number of ways. It suppresses your immune system making it harder to fight off infections and colds. You will also have a higher risk of certain infections " like pneumonia and tuberculosis.    Cancer risk. Alcohol raises your risk of cancer of the mouth, esophagus, pharynx, larynx, liver, and breast.    Sexual function. Alcohol abuse can also lead to sexual problems.  Alcohol use during pregnancy may cause permanent damage to the growing baby.  Home care  The following guidelines will help you care for yourself at home:    Don't drink any more alcohol.    Don't drive until all effects of the alcohol have worn off.    Don't operate machinery that can cause injuries.    Get lots of rest over the next few days. Drink plenty of water and other non-alcoholic liquids. Try to eat regular meals.    If you have been drinking heavily on a daily basis, you may go through alcohol withdrawal. The usual symptoms last 3 to 4 days and may include nervousness, shakiness, nausea, sweating, sleeplessness, and can even cause seizures and a serious withdrawal symptom called delirium tremens, or DTs. During this time, it is best that you stay with family or friends who can help and support you. You can also admit yourself to a residential detox program. If your symptoms are severe (seizures, severe shakiness, confusion), contact your doctor or call an ambulance for help (see below).   Follow-up care  If alcohol is a problem in your life, these are some organizations that can help you:    Alcoholics Anonymous offers support through a self-help fellowship. There are no dues or fees. See the Yellow Pages and call for time and place of meetings. Find AA online at www.aa.org.    [employer] offers support to families of alcohol users. Contact 414-526-7099, or online at www.al-anon.org.    National [place] on Alcoholism and Drug Dependence can be reached at 809-082-0448, or online at www.ncadd.org.    There are also inpatient and residential alcohol detox programs. Check the Internet or phonebook Yellow Pages under  Drug Abuse and Treatment Centers.   Call 911  Call 911 if any of these  occur:    Trouble breathing or slow irregular breathing    Chest pain    Sudden weakness on one side of your body or sudden trouble speaking    Heavy bleeding or vomiting blood    Very drowsy or trouble awakening    Fainting or loss of consciousness    Rapid heart rate    Seizure  When to seek medical advice  Call your healthcare provider right away if any of these occur:    Severe shakiness     Fever of 100.4 F (38 C) or higher, or as directed by your healthcare provider    Confusion or hallucinations (seeing, hearing, or feeling things that are not there)    Pain in your upper abdomen that gets worse    Repeated vomiting  Date Last Reviewed: 6/1/2016 2000-2017 [employer]. [address] [place] [address]. All rights reserved. This information is not intended as a substitute for professional medical care. Always follow your healthcare professional's instructions.          24 Hour Appointment Hotline       To make an appointment at any [facility], call [phone] ([phone]). If you don't have a family doctor or clinic, we will help you find one. [facility] are conveniently located to serve the needs of you and your family.             Review of your medicines      Notice     You have not been prescribed any medications.            Procedures and tests performed during your visit     Acetaminophen level    Alcohol level blood    CBC with platelets differential    Cardiac Continuous Monitoring    Comprehensive metabolic panel    EKG 12-lead, tracing only    Lipase    Potassium      Orders Needing Specimen Collection     None      Pending Results     Date and Time Order Name Status Description    6/2/2018 2226 EKG 12-lead, tracing only Preliminary             Pending Culture Results     No orders found for last 3 day(s).            Pending Results Instructions     If you had any lab results that were not finalized at the time of your Discharge, you can call the ED Lab  Result RN at [phone]. You will be contacted by this team for any positive Lab results or changes in treatment. The nurses are available 7 days a week from 10A to 6:30P.  You can leave a message 24 hours per day and they will return your call.        Test Results From Your Hospital Stay        6/2/2018 11:20 PM      Component Results     Component Value Ref Range & Units Status    WBC 9.1 4.0 - 11.0 10e9/L Final    RBC Count 5.68 4.4 - 5.9 10e12/L Final    Hemoglobin 16.7 13.3 - 17.7 g/dL Final    Hematocrit 46.8 40.0 - 53.0 % Final    MCV 82 78 - 100 fl Final    MCH 29.4 26.5 - 33.0 pg Final    MCHC 35.7 31.5 - 36.5 g/dL Final    RDW 11.7 10.0 - 15.0 % Final    Platelet Count 279 150 - 450 10e9/L Final    Diff Method Automated Method  Final    % Neutrophils 74.2 % Final    % Lymphocytes 20.6 % Final    % Monocytes 4.0 % Final    % Eosinophils 0.1 % Final    % Basophils 0.9 % Final    % Immature Granulocytes 0.2 % Final    Nucleated RBCs 0 0 /100 Final    Absolute Neutrophil 6.8 1.6 - 8.3 10e9/L Final    Absolute Lymphocytes 1.9 0.8 - 5.3 10e9/L Final    Absolute Monocytes 0.4 0.0 - 1.3 10e9/L Final    Absolute Eosinophils 0.0 0.0 - 0.7 10e9/L Final    Absolute Basophils 0.1 0.0 - 0.2 10e9/L Final    Abs Immature Granulocytes 0.0 0 - 0.4 10e9/L Final    Absolute Nucleated RBC 0.0  Final         6/2/2018 11:43 PM      Component Results     Component Value Ref Range & Units Status    Sodium 141 133 - 144 mmol/L Final    Potassium 3.0 (L) 3.4 - 5.3 mmol/L Final    Chloride 103 94 - 109 mmol/L Final    Carbon Dioxide 28 20 - 32 mmol/L Final    Anion Gap 10 3 - 14 mmol/L Final    Glucose 122 (H) 70 - 99 mg/dL Final    Urea Nitrogen 7 7 - 30 mg/dL Final    Creatinine 0.64 (L) 0.66 - 1.25 mg/dL Final    GFR Estimate >90 >60 mL/min/1.7m2 Final    Non  GFR Calc    GFR Estimate If Black >90 >60 mL/min/1.7m2 Final    African American GFR Calc    Calcium 8.0 (L) 8.5 - 10.1 mg/dL Final    Bilirubin Total 1.0  0.2 - 1.3 mg/dL Final    Albumin 4.1 3.4 - 5.0 g/dL Final    Protein Total 8.0 6.8 - 8.8 g/dL Final    Alkaline Phosphatase 88 40 - 150 U/L Final    ALT 48 0 - 70 U/L Final    AST 54 (H) 0 - 45 U/L Final         6/2/2018 11:40 PM      Component Results     Component Value Ref Range & Units Status    Lipase 125 73 - 393 U/L Final         6/2/2018 11:49 PM      Component Results     Component Value Ref Range & Units Status    Acetaminophen Level <2 mg/L Final    Therapeutic range: 10-20 mg/L         6/3/2018 12:03 AM      Component Results     Component Value Ref Range & Units Status    Ethanol g/dL 0.30 (H) <0.01 g/dL Final    Critical Value called to and read back by  AD LYNCH([place]) 6.3.18 0003 TAMI           6/3/2018  2:18 AM      Component Results     Component Value Ref Range & Units Status    Potassium 3.5 3.4 - 5.3 mmol/L Final                Clinical Quality Measure: Blood Pressure Screening     Your blood pressure was checked while you were in the emergency department today. The last reading we obtained was  BP: (!) 136/92 . Please read the guidelines below about what these numbers mean and what you should do about them.  If your systolic blood pressure (the top number) is less than 120 and your diastolic blood pressure (the bottom number) is less than 80, then your blood pressure is normal. There is nothing more that you need to do about it.  If your systolic blood pressure (the top number) is 120-139 or your diastolic blood pressure (the bottom number) is 80-89, your blood pressure may be higher than it should be. You should have your blood pressure rechecked within a year by a primary care provider.  If your systolic blood pressure (the top number) is 140 or greater or your diastolic blood pressure (the bottom number) is 90 or greater, you may have high blood pressure. High blood pressure is treatable, but if left untreated over time it can put you at risk for heart attack, stroke, or kidney failure. You  "should have your blood pressure rechecked by a primary care provider within the next 4 weeks.  If your provider in the emergency department today gave you specific instructions to follow-up with your doctor or provider even sooner than that, you should follow that instruction and not wait for up to 4 weeks for your follow-up visit.        Thank you for choosing [facility]       Thank you for choosing [facility] for your care. Our goal is always to provide you with excellent care. Hearing back from our patients is one way we can continue to improve our services. Please take a few minutes to complete the written survey that you may receive in the mail after you visit with us. Thank you!        [employer] Information     [facility] lets you send messages to your doctor, view your test results, renew your prescriptions, schedule appointments and more. To sign up, go to [email or web address] . Click on \"Log in\" on the left side of the screen, which will take you to the Welcome page. Then click on \"Sign up Now\" on the right side of the page.     You will be asked to enter the access code listed below, as well as some personal information. Please follow the directions to create your username and password.     Your access code is: [email or web address]  Expires: 2018  2:31 AM     Your access code will  in 90 days. If you need help or a new code, please call your [facility] clinic or [phone].        Care EveryWhere ID     This is your Care EveryWhere ID. This could be used by other organizations to access your [facility] medical records  [id number]        Equal Access to Services     [facility] ROCIO : Hadii aad ku hadasho Sotressaali, waaxda luqadaha, qaybta kaalmada adecornellyada, elena santa. So [facility] [phone].    ATENCIÓN: Si habla español, tiene a restrepo disposición servicios gratuitos de asistencia lingüística. Llame al [phone].    We comply with applicable federal civil rights laws and [place] " laws. We do not discriminate on the basis of race, color, national origin, age, disability, sex, sexual orientation, or gender identity.            After Visit Summary       This is your record. Keep this with you and show to your community pharmacist(s) and doctor(s) at your next visit.

## 2018-06-03 VITALS
DIASTOLIC BLOOD PRESSURE: 95 MMHG | WEIGHT: 165 LBS | SYSTOLIC BLOOD PRESSURE: 143 MMHG | TEMPERATURE: 98.8 F | OXYGEN SATURATION: 98 % | RESPIRATION RATE: 16 BRPM | BODY MASS INDEX: 25.9 KG/M2 | HEIGHT: 67 IN

## 2018-06-03 LAB
ETHANOL SERPL-MCNC: 0.3 G/DL
POTASSIUM SERPL-SCNC: 3.5 MMOL/L (ref 3.4–5.3)

## 2018-06-03 PROCEDURE — 84132 ASSAY OF SERUM POTASSIUM: CPT | Performed by: EMERGENCY MEDICINE

## 2018-06-03 PROCEDURE — 25000128 H RX IP 250 OP 636: Performed by: EMERGENCY MEDICINE

## 2018-06-03 PROCEDURE — 25000132 ZZH RX MED GY IP 250 OP 250 PS 637: Performed by: EMERGENCY MEDICINE

## 2018-06-03 PROCEDURE — 96365 THER/PROPH/DIAG IV INF INIT: CPT

## 2018-06-03 PROCEDURE — 96366 THER/PROPH/DIAG IV INF ADDON: CPT

## 2018-06-03 RX ADMIN — Medication 10 MEQ: at 00:53

## 2018-06-03 RX ADMIN — POTASSIUM CHLORIDE 20 MEQ: 1500 TABLET, EXTENDED RELEASE ORAL at 00:50

## 2018-06-03 NOTE — ED TRIAGE NOTES
A&Ox4, ABC's intact. Pt arrives requesting help for ETOH withdrawal.  States he has a hx of seizures.  Last drink 5 hrs ago.  Drank 1L Whiskey.  Pain 10/10 at this time.

## 2018-06-03 NOTE — ED PROVIDER NOTES
"  History     Chief Complaint:  Alcohol intoxication and withdrawal symptoms    HPI   Aly Solitario Jr. is a 33 year old male with a history of alcohol abuse who presents with alcohol intoxication and withdrawal symptoms. The patient reports that he has been sober for 2 years, but starting a couple days ago he started drinking again. He reports that he had \"probably a liter\" of whiskey today, which for him is more than usual. As his last drink was around 5 hours ago, he reports starting to experience some withdrawal symptoms such as sweats and tremors, at which point his sister decided to bring him in. Upon arrival, he denies any urinary complications, vomiting, diarrhea, abdominal pain, or any other symptoms at this time. He denies any other substance abuse or suicidal thoughts, and also notes that he does want to get help regarding his drinking problem.    Allergies:  No Known Drug Allergies    Medications:    The patient is not currently taking any prescribed medications    Past Medical History:    Substance abuse    Past Surgical History:    Arm surgery with graft due to artery issue    Family History:    No past pertinent family history.    Social History:  The patient was accompanied to the ED by his sister.  Smoking Status: Yes  Smokeless Tobacco: No  Alcohol Use: Yes  Marital Status:  Single      Review of Systems   Gastrointestinal: Negative for abdominal pain, constipation, diarrhea and vomiting.   Genitourinary: Negative for dysuria and urgency.   Neurological: Positive for tremors.   Psychiatric/Behavioral: Negative for self-injury and suicidal ideas.   All other systems reviewed and are negative.    Physical Exam   Vitals:  Patient Vitals for the past 24 hrs:   BP Temp Temp src Heart Rate Resp SpO2 Height Weight   06/03/18 0215 (!) 136/92 - - - - - - -   06/03/18 0045 (!) 127/96 - - 82 - 98 % - -   06/03/18 0030 114/74 - - 80 - 96 % - -   06/03/18 0015 127/83 - - 72 - 98 % - -   06/03/18 0000 122/85 " "- - 76 - 96 % - -   06/02/18 2345 127/87 - - 74 - 95 % - -   06/02/18 2330 (!) 135/93 - - 82 - 97 % - -   06/02/18 2315 121/79 - - 63 - 95 % - -   06/02/18 2207 (!) 155/108 98.8  F (37.1  C) Temporal 90 16 98 % 1.702 m (5' 7\") 74.8 kg (165 lb)     Physical Exam   HENT:   Head: Atraumatic.   Right Ear: External ear normal.   Left Ear: External ear normal.   Nose: Nose normal.   Eyes: Conjunctivae and lids are normal. Pupils are equal, round, and reactive to light. No scleral icterus.   Neck: Normal range of motion. Neck supple. No tracheal deviation present.   Cardiovascular: Regular rhythm and intact distal pulses.    Pulmonary/Chest: Breath sounds normal. No respiratory distress.   Abdominal: Soft. He exhibits no distension. There is no tenderness. There is no rebound and no guarding.   Musculoskeletal:   No peripheral edema   Neurological:   Intoxicated but awake and alert able to answer questions and follow commands.  Mild resting tremor in the upper extremities MAEE, no gross focal motor or sensory deficit   Skin: Skin is warm and dry. He is not diaphoretic.   Psychiatric: He has a normal mood and affect.   Nursing note and vitals reviewed.    Emergency Department Course     ECG:  ECG taken at 2334, ECG read at 2339  Normal sinus rhythm  Rightward axis  Borderline ECG  Rate 74 bpm. OH interval 146. QRS duration 88. QT/QTc 408/452. P-R-T axes 33 93 49.    Laboratory:  Laboratory findings were communicated with the patient who voiced understanding of the findings.  Alcohol level: 0.3 (H)  CBC: AWNL. (WBC 9.1, HGB 16.7, )   CMP: Potassium: 3.0 (L), Glucose: 122 (H), Creatinine: 0.64 (L), Calcium: 8.0 (L), AST: 54 (H), o/w WNL  Lipase: 125  Acetaminophen level: <2    Interventions:  2322 Lactated ringers 1000 mL IV  2322 Ativan, 1 mg IV  2322 Thiamine, 100 mg tablet PO  2322 Folvite, 1 mg tablet PO  2352 Ativan, 1 mg IV  0050 K-Dur, 20 mEq PO  0053 Normal Saline 1000 mL IV     Emergency Department " Course:  Nursing notes and vitals reviewed.  2216 I had my initial encounter with the patient.  I performed an exam of the patient as documented above.   EKG obtained in the ED, see results above.   IV was inserted and blood was drawn for laboratory testing, results above.    0219 I discussed the treatment plan with the patient. They expressed understanding of this plan and consented to discharge. They will be discharged home with instructions for care and follow up. In addition, the patient will return to the emergency department with any new or worsening symptoms.  All questions were answered.  Impression & Plan      Medical Decision Makin-year-old gentleman with a history of alcohol abuse who recently relapsed and has been on a 6 day binge of alcohol.  Denies any other intoxicating substances.  He is looking for help with early symptoms of withdrawal and has a history of withdrawal seizures.  Last drink 5 hours ago and drank approximately 1 L today.  No vomiting significant abdominal pain no reports of falls or secondary trauma due to the alcohol intoxication.  We will do basic blood tests hydration vitamin replacement low-dose benzodiazepine.  Ultimate disposition will depend on potential benzodiazepine needs detox center versus inpatient admission he would be at high risk of relapsing where he did go home.    Remains stable here in the emergency department. Received 2 mg of Ativan over a 4 hour stay with improvement in his tremor. Vital signs normalized as well. Mild hypokalemia which was replaced and rechecked. I think the best place for him at this point is detox given his mild withdrawal. I don't think he needs impatient admission and would prefer detox rather than home given his high risk of relapse.     Diagnosis:    ICD-10-CM    1. Hypokalemia E87.6 Potassium   2. Alcoholic intoxication with complication (H) F10.929      Disposition:   Discharge     Scribe Disclosure:  I, Cristian Martin, am serving as  a scribe at 10:29 PM on 6/2/2018 to document services personally performed by Chema Strong, *, based on my observations and the provider's statements to me.  6/2/2018   [facility] EMERGENCY DEPARTMENT     Chema Strong MD  06/03/18 0359

## 2018-06-03 NOTE — ED NOTES
Patient wants this writer to call patient's sister to provide with updates. Patient gave verbal consent to provide medical information over the phone with patient's sister.    Call to Araceli at [phone] x2 but only reached voicemail. With patient's consent, voicemail was left to sister.    Patient was also encourage to call patient's sister later in the morning with his cell phone in the morning to provide an update to her sister. Patient verbalize understanding.

## 2018-06-03 NOTE — ED NOTES
Patient stated that he still feels tremors to his hands. MD made aware. New orders received and will be carry out.

## 2018-06-03 NOTE — DISCHARGE INSTRUCTIONS
Please make an appointment to follow up with your primary care provider in 4-5 days .      Hypokalemia  Hypokalemia means a low level of potassium in the blood. This most often occurs in people who take water pills (diuretics). It can also occur because of severe vomiting or diarrhea. You may also have it if you take laxatives for long periods of time. It sometimes happens if you have low magnesium (hypomagnesemia). If you have this, your healthcare provider will treat the low magnesium first.  A mild case of hypokalemia usually causes no symptoms. It is only found with blood testing. More severe potassium loss causes overall weakness, muscle or abdominal cramps, rapid or irregular heartbeats (heart palpitations), low blood pressure, and muscle weakness.   Home care    Take any potassium supplements as prescribed.    Eat foods rich in potassium. The highest amount is found in avocado, baked potatoes, spinach, cantaloupe, cod, halibut, salmon, and scallops. White, red, or underwood beans are also very good sources. A modest amount of potassium is found in orange juice, bananas, carrots, and tomato juice.    If you take certain types of diuretics, you will also need to take potassium supplements. If you take a diuretic, discuss potassium supplements with your doctor.  Follow-up care  Follow up with your healthcare provider for a repeat blood test within the next week, or as advised by our staff.  When to seek medical advice  Call your healthcare provider right away if any of the following occur:    Increased weakness, fatigue, or muscle cramps    Dizziness  Call 911  Call 911 if any of the following occur:    Irregular heartbeat, extra beats, or very fast heart rate    Loss of consciousness  Date Last Reviewed: 7/1/2017 2000-2017 [employer]. [address] [place] [address], [place] [address]. All rights reserved. This information is not intended as a substitute for professional medical care. Always follow your  "healthcare professional's instructions.          Alcohol Intoxication  Alcohol intoxication occurs when you drink alcohol faster than your liver can remove it from your system. The following facts are important to remember:    It can take 10 minutes or more to start to feel the effects of a drink, so you can easily get more intoxicated than you intended.    One drink may be more than 1 serving of alcohol. Depending on the drink, it can be 2 to 4 servings.    It takes about an hour for your body to metabolize (clear) 1 serving. If you have more than 1 drink, it can take a couple of hours or more.    Many things affect how drinks will affect you, including whether you ve eaten, how fast you drink, your size, how much you normally drink (or not), medicines you take, chronic diseases you have, and gender.  Signs and symptoms of alcohol poisoning  The following are signs and symptoms of alcohol poisoning:  Mild impairment    Reduced inhibitions    Slurred speech    Drowsiness    Decreased fine motor skills  Moderate impairment    Erratic behavior, aggression, depression    Impaired judgment    Confusion    Concentration difficulties    Coordination problems  Severe impairment    Vomiting    Seizures    Unconsciousness    Cold, clammy    Slow or irregular breathing    Hypothermia (low body temperature)    Coma  Health effects  Alcohol abuse causes health problems. Sometimes this can happen after only drinking a  little.\" There is no set number of drinks or amount of alcohol that defines too much. The more you drink at one time, and the more frequently you drink determine both the short-term and long-term health effects. It affects all parts of your body and your health, including your:    Brain. Alcohol is a central nervous system depressant. It can damage parts of the brain that affect your balance, memory, thinking, and emotions. It can cause memory loss, blackouts, depression, agitation, sleep cycle changes, and " seizures. These changes may or may not be reversible.    Heart and vascular system. Alcohol affects multiple areas. It can damage heart muscle causing cardiomyopathy, which is a weakening and stretching of the heart muscle. This can lead to trouble breathing, an irregular heartbeat, atrial fibrillation, leg swelling, and heart failure. It makes the blood vessels stiffen causing hypertension (high blood pressure). All of these problems increase your risk of having heart attacks or strokes.    Liver. Alcohol causes fat to build up in the liver, affecting its normal function. This increases the risk for hepatitis, leading to abdominal pain, appetite loss, jaundice, bleeding problems, liver fibrosis, and cirrhosis. This in turn can affect your ability to fight off infections, and can cause diabetes. The liver changes prevent it from removing toxins in your blood that can cause encephalopathy. Signs of this are confusion, altered level of consciousness, personality changes, memory loss, seizures, coma, and death.    Pancreas. Alcohol can cause inflammation of the pancreas, or pancreatitis. This can cause pain in your abdomen, fever, and diabetes.    Immune system. Alcohol weakens your immune system in a number of ways. It suppresses your immune system making it harder to fight off infections and colds. You will also have a higher risk of certain infections like pneumonia and tuberculosis.    Cancer risk. Alcohol raises your risk of cancer of the mouth, esophagus, pharynx, larynx, liver, and breast.    Sexual function. Alcohol abuse can also lead to sexual problems.  Alcohol use during pregnancy may cause permanent damage to the growing baby.  Home care  The following guidelines will help you care for yourself at home:    Don't drink any more alcohol.    Don't drive until all effects of the alcohol have worn off.    Don't operate machinery that can cause injuries.    Get lots of rest over the next few days. Drink plenty  of water and other non-alcoholic liquids. Try to eat regular meals.    If you have been drinking heavily on a daily basis, you may go through alcohol withdrawal. The usual symptoms last 3 to 4 days and may include nervousness, shakiness, nausea, sweating, sleeplessness, and can even cause seizures and a serious withdrawal symptom called delirium tremens, or DTs. During this time, it is best that you stay with family or friends who can help and support you. You can also admit yourself to a residential detox program. If your symptoms are severe (seizures, severe shakiness, confusion), contact your doctor or call an ambulance for help (see below).   Follow-up care  If alcohol is a problem in your life, these are some organizations that can help you:    Alcoholics Anonymous offers support through a self-help fellowship. There are no dues or fees. See the Yellow Pages and call for time and place of meetings. Find AA online at www.aa.org.    [employer] offers support to families of alcohol users. Contact 957-733-6251, or online at www.al-anon.org.    National [place] on Alcoholism and Drug Dependence can be reached at 292-965-8158, or online at www.ncadd.org.    There are also inpatient and residential alcohol detox programs. Check the Internet or phonebook Yellow Pages under  D[facility] Abuse [facility] Treatment Centers.   Call 911  Call 911 if any of these occur:    Trouble breathing or slow irregular breathing    Chest pain    Sudden weakness on one side of your body or sudden trouble speaking    Heavy bleeding or vomiting blood    Very drowsy or trouble awakening    Fainting or loss of consciousness    Rapid heart rate    Seizure  When to seek medical advice  Call your healthcare provider right away if any of these occur:    Severe shakiness     Fever of 100.4 F (38 C) or higher, or as directed by your healthcare provider    Confusion or hallucinations (seeing, hearing, or feeling things that are not there)    Pain in your upper  abdomen that gets worse    Repeated vomiting  Date Last Reviewed: 6/1/2016 2000-2017 [email or web address]. [address] [place] [address], [place] [address]. All rights reserved. This information is not intended as a substitute for professional medical care. Always follow your healthcare professional's instructions.

## 2018-06-04 LAB — INTERPRETATION ECG - MUSE: NORMAL

## 2018-06-19 ENCOUNTER — APPOINTMENT (OUTPATIENT)
Dept: GENERAL RADIOLOGY | Facility: CLINIC | Age: 33
End: 2018-06-19
Attending: EMERGENCY MEDICINE

## 2018-06-19 ENCOUNTER — HOSPITAL ENCOUNTER (EMERGENCY)
Facility: CLINIC | Age: 33
Discharge: OTHER INSTITUTION | End: 2018-06-20
Attending: EMERGENCY MEDICINE | Admitting: EMERGENCY MEDICINE

## 2018-06-19 DIAGNOSIS — F10.220 ACUTE ALCOHOLIC INTOXICATION IN ALCOHOLISM WITHOUT COMPLICATION (H): ICD-10-CM

## 2018-06-19 DIAGNOSIS — R07.9 CHEST PAIN, UNSPECIFIED TYPE: ICD-10-CM

## 2018-06-19 LAB
ALBUMIN SERPL-MCNC: 4.2 G/DL (ref 3.4–5)
ALP SERPL-CCNC: 87 U/L (ref 40–150)
ALT SERPL W P-5'-P-CCNC: 42 U/L (ref 0–70)
AMPHETAMINES UR QL SCN: NEGATIVE
ANION GAP SERPL CALCULATED.3IONS-SCNC: 10 MMOL/L (ref 3–14)
AST SERPL W P-5'-P-CCNC: 51 U/L (ref 0–45)
BARBITURATES UR QL: NEGATIVE
BASOPHILS # BLD AUTO: 0.1 10E9/L (ref 0–0.2)
BASOPHILS NFR BLD AUTO: 0.8 %
BENZODIAZ UR QL: NEGATIVE
BILIRUB SERPL-MCNC: 0.4 MG/DL (ref 0.2–1.3)
BUN SERPL-MCNC: 11 MG/DL (ref 7–30)
CALCIUM SERPL-MCNC: 7.7 MG/DL (ref 8.5–10.1)
CANNABINOIDS UR QL SCN: POSITIVE
CHLORIDE SERPL-SCNC: 104 MMOL/L (ref 94–109)
CO2 SERPL-SCNC: 26 MMOL/L (ref 20–32)
COCAINE UR QL: NEGATIVE
CREAT SERPL-MCNC: 0.74 MG/DL (ref 0.66–1.25)
DIFFERENTIAL METHOD BLD: ABNORMAL
EOSINOPHIL # BLD AUTO: 0 10E9/L (ref 0–0.7)
EOSINOPHIL NFR BLD AUTO: 0.1 %
ERYTHROCYTE [DISTWIDTH] IN BLOOD BY AUTOMATED COUNT: 12.6 % (ref 10–15)
ETHANOL SERPL-MCNC: 0.37 G/DL
GFR SERPL CREATININE-BSD FRML MDRD: >90 ML/MIN/1.7M2
GLUCOSE SERPL-MCNC: 118 MG/DL (ref 70–99)
HCT VFR BLD AUTO: 51.8 % (ref 40–53)
HGB BLD-MCNC: 17.8 G/DL (ref 13.3–17.7)
IMM GRANULOCYTES # BLD: 0 10E9/L (ref 0–0.4)
IMM GRANULOCYTES NFR BLD: 0.4 %
LACTATE BLD-SCNC: 2.7 MMOL/L (ref 0.7–2)
LACTATE BLD-SCNC: 3.4 MMOL/L (ref 0.7–2)
LIPASE SERPL-CCNC: 160 U/L (ref 73–393)
LYMPHOCYTES # BLD AUTO: 2.9 10E9/L (ref 0.8–5.3)
LYMPHOCYTES NFR BLD AUTO: 28.4 %
MCH RBC QN AUTO: 29.4 PG (ref 26.5–33)
MCHC RBC AUTO-ENTMCNC: 34.4 G/DL (ref 31.5–36.5)
MCV RBC AUTO: 86 FL (ref 78–100)
MONOCYTES # BLD AUTO: 0.3 10E9/L (ref 0–1.3)
MONOCYTES NFR BLD AUTO: 3.3 %
NEUTROPHILS # BLD AUTO: 6.8 10E9/L (ref 1.6–8.3)
NEUTROPHILS NFR BLD AUTO: 67 %
NRBC # BLD AUTO: 0 10*3/UL
NRBC BLD AUTO-RTO: 0 /100
OPIATES UR QL SCN: NEGATIVE
PCP UR QL SCN: NEGATIVE
PLATELET # BLD AUTO: 445 10E9/L (ref 150–450)
POTASSIUM SERPL-SCNC: 3.6 MMOL/L (ref 3.4–5.3)
PROT SERPL-MCNC: 8.4 G/DL (ref 6.8–8.8)
RBC # BLD AUTO: 6.06 10E12/L (ref 4.4–5.9)
SODIUM SERPL-SCNC: 140 MMOL/L (ref 133–144)
TROPONIN I SERPL-MCNC: <0.015 UG/L (ref 0–0.04)
WBC # BLD AUTO: 10.1 10E9/L (ref 4–11)

## 2018-06-19 PROCEDURE — 80053 COMPREHEN METABOLIC PANEL: CPT | Performed by: EMERGENCY MEDICINE

## 2018-06-19 PROCEDURE — 80307 DRUG TEST PRSMV CHEM ANLYZR: CPT | Performed by: EMERGENCY MEDICINE

## 2018-06-19 PROCEDURE — 96375 TX/PRO/DX INJ NEW DRUG ADDON: CPT

## 2018-06-19 PROCEDURE — 25000128 H RX IP 250 OP 636: Performed by: EMERGENCY MEDICINE

## 2018-06-19 PROCEDURE — 25000132 ZZH RX MED GY IP 250 OP 250 PS 637: Performed by: EMERGENCY MEDICINE

## 2018-06-19 PROCEDURE — 96374 THER/PROPH/DIAG INJ IV PUSH: CPT

## 2018-06-19 PROCEDURE — 80320 DRUG SCREEN QUANTALCOHOLS: CPT | Performed by: EMERGENCY MEDICINE

## 2018-06-19 PROCEDURE — 85025 COMPLETE CBC W/AUTO DIFF WBC: CPT | Performed by: EMERGENCY MEDICINE

## 2018-06-19 PROCEDURE — 99285 EMERGENCY DEPT VISIT HI MDM: CPT | Mod: 25

## 2018-06-19 PROCEDURE — 83690 ASSAY OF LIPASE: CPT | Performed by: EMERGENCY MEDICINE

## 2018-06-19 PROCEDURE — 84484 ASSAY OF TROPONIN QUANT: CPT | Performed by: EMERGENCY MEDICINE

## 2018-06-19 PROCEDURE — 83605 ASSAY OF LACTIC ACID: CPT | Performed by: EMERGENCY MEDICINE

## 2018-06-19 PROCEDURE — 96361 HYDRATE IV INFUSION ADD-ON: CPT

## 2018-06-19 PROCEDURE — 71046 X-RAY EXAM CHEST 2 VIEWS: CPT

## 2018-06-19 PROCEDURE — 93005 ELECTROCARDIOGRAM TRACING: CPT

## 2018-06-19 RX ORDER — ONDANSETRON 2 MG/ML
4 INJECTION INTRAMUSCULAR; INTRAVENOUS ONCE
Status: COMPLETED | OUTPATIENT
Start: 2018-06-19 | End: 2018-06-19

## 2018-06-19 RX ORDER — LORAZEPAM 2 MG/ML
0.5 INJECTION INTRAMUSCULAR ONCE
Status: COMPLETED | OUTPATIENT
Start: 2018-06-19 | End: 2018-06-19

## 2018-06-19 RX ORDER — LORAZEPAM 2 MG/ML
0.25 INJECTION INTRAMUSCULAR ONCE
Status: DISCONTINUED | OUTPATIENT
Start: 2018-06-19 | End: 2018-06-19

## 2018-06-19 RX ORDER — LORAZEPAM 0.5 MG/1
0.5 TABLET ORAL ONCE
Status: COMPLETED | OUTPATIENT
Start: 2018-06-19 | End: 2018-06-19

## 2018-06-19 RX ORDER — LORAZEPAM 2 MG/ML
0.5 INJECTION INTRAMUSCULAR ONCE
Status: DISCONTINUED | OUTPATIENT
Start: 2018-06-19 | End: 2018-06-19

## 2018-06-19 RX ADMIN — LORAZEPAM 0.5 MG: 0.5 TABLET ORAL at 22:51

## 2018-06-19 RX ADMIN — SODIUM CHLORIDE 1000 ML: 9 INJECTION, SOLUTION INTRAVENOUS at 21:41

## 2018-06-19 RX ADMIN — SODIUM CHLORIDE 1000 ML: 9 INJECTION, SOLUTION INTRAVENOUS at 20:08

## 2018-06-19 RX ADMIN — LORAZEPAM 0.5 MG: 2 INJECTION INTRAMUSCULAR; INTRAVENOUS at 21:13

## 2018-06-19 RX ADMIN — ONDANSETRON 4 MG: 2 INJECTION INTRAMUSCULAR; INTRAVENOUS at 20:21

## 2018-06-19 ASSESSMENT — ENCOUNTER SYMPTOMS
FEVER: 0
VOMITING: 1
MYALGIAS: 1
HEADACHES: 0
DYSPHORIC MOOD: 1
NAUSEA: 1

## 2018-06-20 VITALS
RESPIRATION RATE: 18 BRPM | DIASTOLIC BLOOD PRESSURE: 84 MMHG | OXYGEN SATURATION: 97 % | HEART RATE: 107 BPM | TEMPERATURE: 97.2 F | SYSTOLIC BLOOD PRESSURE: 120 MMHG

## 2018-06-20 LAB
ALCOHOL BREATH TEST: 0.18 (ref 0–0.01)
INTERPRETATION ECG - MUSE: NORMAL

## 2018-06-20 PROCEDURE — 25000132 ZZH RX MED GY IP 250 OP 250 PS 637: Performed by: EMERGENCY MEDICINE

## 2018-06-20 RX ORDER — DIAZEPAM 5 MG
5 TABLET ORAL ONCE
Status: COMPLETED | OUTPATIENT
Start: 2018-06-20 | End: 2018-06-20

## 2018-06-20 RX ADMIN — DIAZEPAM 5 MG: 5 TABLET ORAL at 01:12

## 2018-06-20 NOTE — ED NOTES
"Called into pts room who says \"I'm starting to feel like I did before I had my last seizure.\" Asked pt to clarify symptoms, he says \"I just feel anxious and scared, because I don't want it to happen again.\" MD notified.   "

## 2018-06-20 NOTE — DISCHARGE INSTRUCTIONS
Please make an appointment to follow up with [facility] [phone] in 3-5 days even if entirely better.      Discharge Instructions  Chest Pain    You have been seen today for chest pain or discomfort.  At this time, your provider has found no signs that your chest pain is due to a serious or life-threatening condition, (or you have declined more testing and/or admission to the hospital). However, sometimes there is a serious problem that does not show up right away. Your evaluation today may not be complete and you may need further testing and evaluation.     Generally, every Emergency Department visit should have a follow-up clinic visit with either a primary or a specialty clinic/provider. Please follow-up as instructed by your emergency provider today.  Return to the Emergency Department if:    Your chest pain changes, gets worse, starts to happen more often, or comes with less activity.    You are newly short of breath.    You get very weak or tired.    You pass out or faint.    You have any new symptoms, like fever, cough, numb legs, or you cough up blood.    You have anything else that worries you.    Until you follow-up with your regular provider, please do the following:    Take one aspirin daily unless you have an allergy or are told not to by your provider.    If a stress test appointment has been made, go to the appointment.    If you have questions, contact your regular provider.    Follow-up with your regular provider/clinic as directed; this is very important.    If you were given a prescription for medicine here today, be sure to read all of the information (including the package insert) that comes with your prescription.  This will include important information about the medicine, its side effects, and any warnings that you need to know about.  The pharmacist who fills the prescription can provide more information and answer questions you may have about the medicine.  If you have  questions or concerns that the pharmacist cannot address, please call or return to the Emergency Department.       Remember that you can always come back to the Emergency Department if you are not able to see your regular provider in the amount of time listed above, if you get any new symptoms, or if there is anything that worries you.

## 2018-06-20 NOTE — ED NOTES
Pt brought ice water per request. Pt was read his rights from 72hr hold paperwork and left a copy in his possession.

## 2018-06-20 NOTE — ED PROVIDER NOTES
Patient changed over from Dr. Donohue to follow-up on chest x-ray for the patient which results are unremarkable.  He was also pending transfer to [facility] but needed to ensure that he was more sober per request by detox center.  Patient has done well in the ED.  He is no longer tachycardic.  Lactate is improving and likely related to alcohol intoxication.  On recheck, he is alert and oriented.  He is without symptoms and has no developing or concerning signs for early alcohol withdrawal.  Patient safe for transfer to [facility].     Marvin Marks MD  06/20/18 0027

## 2018-06-20 NOTE — ED NOTES
DEC attempted to evaluate, patient refused to speak to .  did speak with sister who is concerned about patient's current state and ability to function independently. They are advocating for detox at this time. [facility] was contacted by DEC and a bed is being held if/when patient is medically cleared. BATSHEVA.

## 2018-06-20 NOTE — ED TRIAGE NOTES
Patient states he drank a pint of fireball today- last drink 1 hour ago. Patient staes he thinks hes in withdrawal and has a history of seizures. ABC intact alert and no distress, Chest pain x 1 hour.

## 2018-06-20 NOTE — ED PROVIDER NOTES
History     Chief Complaint:  Alcohol Problem and Chest Pain    HPI   Aly Solitario Jr. is a 33 year old male with a history of substance abuse who presents to the emergency department for evaluation of chest pain and alcohol use. The patient reports he had been sober 2 years until 3 days ago, since which he has been drinking constantly, due to stress related to his daughter. He indicates his last drink was about 1 hour ago, finishing off a bottle of whisky which he had been drinking all day. The patient reports constant sternal chest pain for the past hour, coupled with left forearm pain, and nausea with one episode of nonbloody vomiting 1 hour ago. The patient denies any history of liver issues or chest pain with drinking, and further denies any headache or fever. He states his sister brought him to the ED today out of concern for potential seizure; he has a history of seizure, last 3 years ago. He has not had a seizure today.    Allergies:  NKDA     Medications:    The patient is currently on no regular medications.     Past Medical History:    Substance abuse    Past Surgical History:    Arm surgery with graft    Family History:    No past pertinent family history.    Social History:  Presents with sister.  Current every day smoker, 0.5 ppd.  Positive for alcohol use.   Marital Status:  Single [1]     Review of Systems   Constitutional: Negative for fever.   Cardiovascular: Positive for chest pain.   Gastrointestinal: Positive for nausea and vomiting.   Musculoskeletal: Positive for myalgias.   Neurological: Negative for headaches.   Psychiatric/Behavioral: Positive for dysphoric mood.     Physical Exam     Patient Vitals for the past 24 hrs:   BP Temp Temp src Pulse Heart Rate Resp SpO2   06/19/18 2130 - - - - 114 15 95 %   06/19/18 2100 (!) 146/97 - - - 129 17 96 %   06/19/18 2045 (!) 137/92 - - - 101 10 96 %   06/19/18 2036 - - - - 106 16 95 %   06/19/18 2035 - - - - 111 18 96 %   06/19/18 2030 (!)  138/95 - - - 101 - 95 %   06/19/18 2015 (!) 143/102 - - - 105 - 96 %   06/19/18 2011 - - - - 108 - 97 %   06/19/18 2008 (!) 136/120 - - 107 107 14 97 %   06/19/18 1943 119/85 97.2  F (36.2  C) Temporal 141 - 20 97 %     Physical Exam  General: Patient is alert and interactive when I enter the room   Head:  The scalp, face, and head appear normal  Eyes:  Conjunctivae are normal  ENT:    The nose is normal    Pinnae are normal    External acoustic canals are normal, dry mucous membranes   Neck:  Trachea midline  CV:  Pulses are normal, tachycardic, regular, no murmurs    Resp:  No respiratory distress , CTAB  Abdomen:      Soft, non-tender, non-distended  Musc:  Normal muscular tone    No major joint effusions  Skin:  No rash or lesions noted  Neuro:  Speech is normal and fluent. Face is symmetric.     Moving all extremities well. No tongue fasciculations.   Psych: Awake. Alert.  Normal affect. Mild anxiety. Appropriate interactions.    Emergency Department Course   ECG:  Time: 1950  Vent. Rate 123 bpm. VT interval 142. QRS duration 88. QT/QTc 324/463. P-R-T axis 84 102 27. Sinus tachycardia. Rightward axis. Borderline ECG. Read time:   Imaging:  XR Chest 2 Views   Final Result   IMPRESSION: No acute abnormality.      VIVIANA HALL MD          Laboratory:  CBC: WBC: 10.1, HGB: 17.8 (H), PLT: 445  CMP: Glucose 118 (H), Calcium 7.7(L), AST 51 (H), o/w WNL (Creatinine: 0.74)    2006 Alcohol Level Blood: 0.37 (HH)    Lipase: 160    Lactic acid: 3.4 (H)    2006 Troponin I: <0.015    Interventions:  2008 NS 1L IV  2021 Zofran 4 mg IV  2113 Ativan 0.5 mg IV  2141 NS 1L IV  Medications   0.9% sodium chloride BOLUS (1,000 mLs Intravenous New Bag 6/19/18 2141)   0.9% sodium chloride BOLUS (0 mLs Intravenous Stopped 6/19/18 2135)   ondansetron (ZOFRAN) injection 4 mg (4 mg Intravenous Given 6/19/18 2021)   LORazepam (ATIVAN) injection 0.5 mg (0.5 mg Intravenous Given 6/19/18 3524)       Emergency Department Course:  Nursing  notes and vitals reviewed. 1952 I performed an exam of the patient as documented above.     EKG obtained in the ED, see results above.     IV inserted. Medicine administered as documented above. Blood drawn. This was sent to the lab for further testing, results above.    2210 I spoke with DEC regarding this patient following his conversation with family and evaluation with patient.    I rechecked the patient and discussed the results of his workup thus far.     Impression & Plan      Medical Decision Making:  Aly Solitario is a 34 yo with a history of substance abuse who presents with alcohol intoxication and chest pain.  Patient admits to recent alcohol use and I suspect that he is intoxicated.  His buttock level actually returned 0.3.  He is very anxious and concerned about alcohol withdrawal seizures however I do not think he is at risk at this point given his high intoxication level.  Patient was given a small dose of Ativan which helped.  He was given IV fluids are to help with his tachycardia as well.  His electrolytes and CBC are unremarkable.  In terms of chest pain I think it is likely alcohol related but an EKG and troponin were obtained which were unremarkable.  Patient's lactate was elevated which is likely secondary to alcohol use and dehydration.  Patient was given a total of 2 L of fluid. DEC was able to evaluate patient and recommended detox however patient did not want to go to [facility] detox.  We had a discussion with the family and given his heavy alcohol use and destructive behavior with a family history of suicidal attempts we decided to place him on a 72 hour hold.  Patient was cooperative with this.  Patient was signed out to Dr. Marks for continued hydration repeat lactate and chest x-ray.  Plan to go to detox once more medically stable and clear.         Diagnosis:    ICD-10-CM    1. Acute alcoholic intoxication in alcoholism without complication (H) F10.220    2. Chest pain,  unspecified type R07.9        Disposition:  discharged to home    Discharge Medications:  New Prescriptions    No medications on file     I, Deniz Tellez, am serving as a scribe on 6/19/2018 at 7:52 PM to personally document services performed by Ellen Donohue MD based on my observations and the provider's statements to me.     Deniz Tellez  6/19/2018   [facility] EMERGENCY DEPARTMENT       Ellen Donohue MD  06/23/18 0049

## 2018-10-21 ENCOUNTER — HOSPITAL ENCOUNTER (EMERGENCY)
Facility: CLINIC | Age: 33
Discharge: OTHER INSTITUTION | End: 2018-10-21
Attending: EMERGENCY MEDICINE | Admitting: EMERGENCY MEDICINE

## 2018-10-21 VITALS
DIASTOLIC BLOOD PRESSURE: 100 MMHG | OXYGEN SATURATION: 95 % | RESPIRATION RATE: 22 BRPM | TEMPERATURE: 98.1 F | HEART RATE: 94 BPM | SYSTOLIC BLOOD PRESSURE: 130 MMHG

## 2018-10-21 DIAGNOSIS — F10.920 ALCOHOLIC INTOXICATION WITHOUT COMPLICATION (H): ICD-10-CM

## 2018-10-21 LAB
ALBUMIN SERPL-MCNC: 4.1 G/DL (ref 3.4–5)
ALP SERPL-CCNC: 84 U/L (ref 40–150)
ALT SERPL W P-5'-P-CCNC: 29 U/L (ref 0–70)
ANION GAP SERPL CALCULATED.3IONS-SCNC: 7 MMOL/L (ref 3–14)
AST SERPL W P-5'-P-CCNC: 31 U/L (ref 0–45)
BILIRUB SERPL-MCNC: 0.2 MG/DL (ref 0.2–1.3)
BUN SERPL-MCNC: 9 MG/DL (ref 7–30)
CALCIUM SERPL-MCNC: 7.8 MG/DL (ref 8.5–10.1)
CHLORIDE SERPL-SCNC: 108 MMOL/L (ref 94–109)
CO2 SERPL-SCNC: 28 MMOL/L (ref 20–32)
CREAT SERPL-MCNC: 0.91 MG/DL (ref 0.66–1.25)
ETHANOL SERPL-MCNC: 0.42 G/DL
GFR SERPL CREATININE-BSD FRML MDRD: >90 ML/MIN/1.7M2
GLUCOSE SERPL-MCNC: 101 MG/DL (ref 70–99)
LIPASE SERPL-CCNC: 158 U/L (ref 73–393)
POTASSIUM SERPL-SCNC: 3.9 MMOL/L (ref 3.4–5.3)
PROT SERPL-MCNC: 8.5 G/DL (ref 6.8–8.8)
SODIUM SERPL-SCNC: 143 MMOL/L (ref 133–144)

## 2018-10-21 PROCEDURE — 83690 ASSAY OF LIPASE: CPT | Performed by: EMERGENCY MEDICINE

## 2018-10-21 PROCEDURE — 80053 COMPREHEN METABOLIC PANEL: CPT | Performed by: EMERGENCY MEDICINE

## 2018-10-21 PROCEDURE — 80320 DRUG SCREEN QUANTALCOHOLS: CPT | Performed by: EMERGENCY MEDICINE

## 2018-10-21 PROCEDURE — 25000125 ZZHC RX 250: Performed by: EMERGENCY MEDICINE

## 2018-10-21 PROCEDURE — 25000128 H RX IP 250 OP 636: Performed by: EMERGENCY MEDICINE

## 2018-10-21 PROCEDURE — 99285 EMERGENCY DEPT VISIT HI MDM: CPT

## 2018-10-21 RX ADMIN — FAMOTIDINE 20 MG: 10 INJECTION, SOLUTION INTRAVENOUS at 15:23

## 2018-10-21 RX ADMIN — SODIUM CHLORIDE 1000 ML: 9 INJECTION, SOLUTION INTRAVENOUS at 15:23

## 2018-10-21 ASSESSMENT — ENCOUNTER SYMPTOMS: ABDOMINAL PAIN: 1

## 2018-10-21 NOTE — ED TRIAGE NOTES
A&O x4, ABCDs intact. Pt arrives with brother-in-law for abdominal pain and ETOH. Pt states he has had this type of abdominal pain before. Pt appears intoxicate and has flat affect.

## 2018-10-21 NOTE — ED NOTES
Called and gave report to Annmarie at [facility]. She is asking that patient be sent around 1900.

## 2018-10-21 NOTE — ED PROVIDER NOTES
History     Chief Complaint:  Alcohol Intoxication    HPI   Aly Solitario Jr. is a 33 year old male with a history of substance abuse who presents with his brother in law to the Emergency Department today for evaluation of alcohol intoxication with desire to detox. The patient's brother in law reports he came in today because he is detoxing from alcohol. The patient reports that his last drink was earlier today. He came in today because he has abdominal pain, which is why he came in today. He has had pancreatitis caused by his alcohol consumption before and reports that this feels similar today. He has been hospitalized for withdrawal before and has a history of withdrawal seizures.     Allergies:  No known drug allergies    Medications:    The patient is not currently taking any prescribed medications.    Past Medical History:    Substance abuse  Pancreatitis  Alcohol withdrawal seizure    Past Surgical History:    Arm surgery with graft due to artery issue    Family History:    History reviewed. No pertinent family history.     Social History:  Smoking status: Current every day smoker 0.50 packs/day  Alcohol use: Yes  Marital Status:  Single [1]     Review of Systems   Gastrointestinal: Positive for abdominal pain.   All other systems reviewed and are negative.    Physical Exam     Patient Vitals for the past 24 hrs:   BP Pulse Heart Rate Resp SpO2   10/21/18 1645 136/89 86 86 16 -   10/21/18 1630 (!) 144/94 - 87 15 95 %   10/21/18 1615 114/77 - - - 94 %   10/21/18 1600 121/79 - - - 96 %   10/21/18 1545 (!) 129/95 - - - 94 %   10/21/18 1530 (!) 140/101 - - - 95 %   10/21/18 1502 (!) 146/107 102 102 14 96 %   10/21/18 1500 (!) 146/107 - - - -       Physical Exam    Constitutional: Smells of alcohol, mildly slurred speech, slowed response  HENT:    Nose: Nose normal.    Mouth/Throat: Oropharynx is clear, mucous membranes are moist   Eyes: EOM are normal, anicteric, conjugate gaze, horizontal nystagmus   CV:  regular rate and rhythm; no murmurs  Chest: Effort normal and breath sounds clear without wheezing or rales, symmetric bilaterally   GI:  non tender. No distension. No guarding or rebound.    MSK: No LE edema, tenderness to palpation of BLE.  Neurological: Alert, persistent non-noxious stimuli did remain engaged in conversation, moving all extremities steady gait on arrival to his room  Skin: Skin is warm and dry.    Emergency Department Course     Laboratory:  CMP: Glucose 101 (H), Calcium 7.8 (L) o/w WNL (Creatinine 0.91)    Lipase: 158  Alcohol level blood: 0.42 (HH)    Interventions:  1523: NS 1L IV Bolus  1523: Pepcid 20 MG IV    Emergency Department Course:  Past medical records, nursing notes, and vitals reviewed.  1459: I performed an exam of the patient and obtained history, as documented above.    IV inserted and blood drawn.    I rechecked the patient. Explained findings to the patient.    Findings and plan explained to the Patient. Patient discharged to detox.    Impression & Plan      Medical Decision Makin-year-old male with past medical history significant for alcohol abuse, history of alcohol induced pancreatitis presenting for evaluation of possible alcohol withdrawal though suspect patient is acutely intoxicated at this time.  Vital signs on arrival are notable for mild tachycardia 102 however he is not anxious appearing, tremulous and is without tongue fasciculations.  Only slurred speech with delayed response and reports his last drink was sometime today.  He also endorses mild epigastric pain on exam he is no significant focal tenderness to the emergency department.  Initial imaging deferred.  IV access was obtained, only normal saline was given along with IV famotidine for possible alcohol induced gastritis.  CMP was sent along with lipase which were remarkable, potassium within normal, lipase.  Plan is for a period of observation monitoring for alcohol withdrawal symptoms in the setting  of patient being acutely intoxicated.  Serum ethanol 0.42.  After period of observation, patient was clearing as expected and requested detox.  Further evaluation was deferred per a bed was arranged at [facility] and transportation arrived patient was able to stand on his own move over the cart and was transferred to detox for continued monitoring.  Does not have any home medications to be sent with.    Diagnosis:    ICD-10-CM   1. Alcoholic intoxication without complication (H) F10.920     Disposition:  discharged to detox  Cristian Panchal MD   [employer]  7:03 PM 10/21/18     Mechelle George  10/21/2018   [facility] EMERGENCY DEPARTMENT  Scribe Disclosure:  I, Mechelle George, am serving as a scribe at 2:59 PM on 10/21/2018 to document services personally performed by Cristian Panchal MD based on my observations and the provider's statements to me.        Cristian Panchal MD  10/21/18 1904

## 2019-01-04 ENCOUNTER — HOSPITAL ENCOUNTER (EMERGENCY)
Facility: CLINIC | Age: 34
Discharge: HOME | End: 2019-01-04
Attending: NURSE PRACTITIONER | Admitting: NURSE PRACTITIONER

## 2019-01-04 VITALS
SYSTOLIC BLOOD PRESSURE: 143 MMHG | DIASTOLIC BLOOD PRESSURE: 106 MMHG | TEMPERATURE: 98.3 F | OXYGEN SATURATION: 97 % | RESPIRATION RATE: 18 BRPM | HEART RATE: 106 BPM

## 2019-01-04 DIAGNOSIS — F10.10 ALCOHOL ABUSE: ICD-10-CM

## 2019-01-04 DIAGNOSIS — F10.929 ALCOHOL INTOXICATION (H): ICD-10-CM

## 2019-01-04 LAB
ALBUMIN SERPL-MCNC: 3.4 G/DL (ref 3.4–5)
ALP SERPL-CCNC: 102 U/L (ref 40–150)
ALT SERPL W P-5'-P-CCNC: 35 U/L (ref 0–70)
ANION GAP SERPL CALCULATED.3IONS-SCNC: 5 MMOL/L (ref 3–14)
AST SERPL W P-5'-P-CCNC: 33 U/L (ref 0–45)
BILIRUB SERPL-MCNC: 0.2 MG/DL (ref 0.2–1.3)
BUN SERPL-MCNC: 9 MG/DL (ref 7–30)
CALCIUM SERPL-MCNC: 7.9 MG/DL (ref 8.5–10.1)
CHLORIDE SERPL-SCNC: 107 MMOL/L (ref 94–109)
CO2 SERPL-SCNC: 29 MMOL/L (ref 20–32)
CREAT SERPL-MCNC: 0.79 MG/DL (ref 0.66–1.25)
ETHANOL SERPL-MCNC: 0.37 G/DL
GFR SERPL CREATININE-BSD FRML MDRD: >90 ML/MIN/{1.73_M2}
GLUCOSE SERPL-MCNC: 114 MG/DL (ref 70–99)
LIPASE SERPL-CCNC: 128 U/L (ref 73–393)
MAGNESIUM SERPL-MCNC: 2 MG/DL (ref 1.6–2.3)
POTASSIUM SERPL-SCNC: 3.4 MMOL/L (ref 3.4–5.3)
PROT SERPL-MCNC: 7.9 G/DL (ref 6.8–8.8)
SODIUM SERPL-SCNC: 141 MMOL/L (ref 133–144)

## 2019-01-04 PROCEDURE — 83690 ASSAY OF LIPASE: CPT | Performed by: NURSE PRACTITIONER

## 2019-01-04 PROCEDURE — 80320 DRUG SCREEN QUANTALCOHOLS: CPT | Performed by: NURSE PRACTITIONER

## 2019-01-04 PROCEDURE — 96360 HYDRATION IV INFUSION INIT: CPT

## 2019-01-04 PROCEDURE — 25000128 H RX IP 250 OP 636: Performed by: NURSE PRACTITIONER

## 2019-01-04 PROCEDURE — 25000132 ZZH RX MED GY IP 250 OP 250 PS 637: Performed by: NURSE PRACTITIONER

## 2019-01-04 PROCEDURE — 96361 HYDRATE IV INFUSION ADD-ON: CPT

## 2019-01-04 PROCEDURE — 99284 EMERGENCY DEPT VISIT MOD MDM: CPT | Mod: 25

## 2019-01-04 PROCEDURE — 83735 ASSAY OF MAGNESIUM: CPT | Performed by: NURSE PRACTITIONER

## 2019-01-04 PROCEDURE — 80053 COMPREHEN METABOLIC PANEL: CPT | Performed by: NURSE PRACTITIONER

## 2019-01-04 RX ORDER — CHLORDIAZEPOXIDE HYDROCHLORIDE 25 MG/1
50 CAPSULE, GELATIN COATED ORAL ONCE
Status: COMPLETED | OUTPATIENT
Start: 2019-01-04 | End: 2019-01-04

## 2019-01-04 RX ADMIN — SODIUM CHLORIDE 1000 ML: 9 INJECTION, SOLUTION INTRAVENOUS at 10:16

## 2019-01-04 RX ADMIN — CHLORDIAZEPOXIDE HYDROCHLORIDE 50 MG: 25 CAPSULE ORAL at 10:41

## 2019-01-04 ASSESSMENT — ENCOUNTER SYMPTOMS
VOMITING: 0
FEVER: 0
SHORTNESS OF BREATH: 0
ABDOMINAL PAIN: 0
DIARRHEA: 0
HEADACHES: 0
DYSURIA: 0
MYALGIAS: 0
NAUSEA: 0

## 2019-01-04 NOTE — ED TRIAGE NOTES
Pt dropped off by sister for concerns of alcohol withdrawal. Pt has hx of withdrawal seizures per pt, last drink was whiskey 5 hours ago. Pt states he feels some nause and headache currently. ABCs intact, calm and cooperative.

## 2019-01-04 NOTE — ED PROVIDER NOTES
History     Chief Complaint:  Alcohol intoxication     HPI   Aly Solitario Jr. is a 33 year old male with a history of alcohol abuse who presents with alcohol intoxication. The patient has been seen in the ED seven times in the past year for alcohol intoxication, including one admission. He presents to the ED today with concerns that he might have a alcohol-related seizure which he has a history for. He has been drinking whiskey for the last two days and stopped drinking shortly before coming to the ED. He denies any falls or injuries and street drug use.     Allergies:  No known drug allergies     Medications:    The patient is currently on no regular medications.     Past Medical History:    Alcohol abuse  Pancreatitis  Substance abuse    Past Surgical History:    Arm surgery with graft    Family History:    History reviewed. No pertinent family history.      Social History:  Smoking Status: Current Every Day Smoker  Alcohol Use: Yes  Patient presents alone.   Marital Status:  Single      Review of Systems   Constitutional: Negative for fever.   Respiratory: Negative for shortness of breath.    Gastrointestinal: Negative for abdominal pain, diarrhea, nausea and vomiting.   Genitourinary: Negative for dysuria.   Musculoskeletal: Negative for myalgias.   Neurological: Negative for headaches.   All other systems reviewed and are negative.      Physical Exam     Patient Vitals for the past 24 hrs:   BP Temp Temp src Pulse Heart Rate Resp SpO2   01/04/19 1339 -- -- -- -- -- -- 97 %   01/04/19 1338 (!) 143/106 -- -- 106 -- -- --   01/04/19 1326 -- -- -- -- -- -- 96 %   01/04/19 1325 (!) 136/94 -- -- 99 -- -- --   01/04/19 1245 -- -- -- -- -- -- 94 %   01/04/19 1230 119/77 -- -- 89 -- -- 93 %   01/04/19 1200 106/69 -- -- 88 -- -- 95 %   01/04/19 1140 -- -- -- -- -- -- 94 %   01/04/19 1100 113/81 -- -- 88 -- -- --   01/04/19 1045 -- -- -- -- -- -- 90 %   01/04/19 1030 (!) 138/95 -- -- 89 -- -- 95 %   01/04/19 1003  (!) 155/110 98.3  F (36.8  C) Oral -- 107 18 94 %      Physical Exam  General: Alert, Mild  discomfort, well kept  Eyes: PERRL, conjunctivae pink no scleral icterus or conjunctival injection  ENT:   Moist mucus membranes, posterior oropharynx clear without erythema or exudates, No lymphadenopathy, Normal voice  Resp:  Lungs clear to auscultation bilaterally, no crackles/rubs/wheezes. Good air movement  CV:  Normal rate and rhythm, no murmurs/rubs/gallops  GI:  Abdomen soft and non-distended.  Normoactive BS.  No tenderness, guarding or rebound, No masses  Skin:  Warm, dry.  No rashes or petechiae  Musculoskeletal: No peripheral edema or calf tenderness, Normal gross ROM   Neuro: Alert and oriented to person/place/time, normal sensation  Psychiatric: Normal affect, cooperative, good eye contact    Emergency Department Course     Laboratory:  CMP: Glucose 114 (H), Calcium 7.9 (L), o/w WNL (Creatinine 0.79)   Magnesium: 2.0  Lipase: 128  EtOH: 0.37 (HH)    Interventions:  1016 - NS 1 L IV Bolus   1041 - Librium 50 mg PO    Emergency Department Course:  Past medical records, nursing notes, and vitals reviewed.  1012: I performed an exam of the patient and obtained history, as documented above.     IV inserted and blood drawn. This was sent to laboratory for testing, findings above.      1129: I rechecked the patient. Explained findings to patient who consents to detox admission.     [place] [facility] [place]    Impression & Plan      Medical Decision Making:  Aly GONSALEZ Kassi Be is a 33 year old male who presents today with concern for possible seizure.  He has been on a drinking binge and stopped drinking early this morning.  He has a history of seizures therefore presented for evaluation.  He has no specific complaints at this time.  He was just concerned for the possibility of a seizure. his evaluation showed no injuries after head to toe exam.  His alcohol level was significantly elevated at  0.37.  The remainder of his laboratory studies were noncontributory.  He was given treatment as above.  He has had no signs of seizure activity is a chronic alcoholic with multiple visits for the same.  As patient had no responsible party to take responsibility for him and with his history he was admitted [place] [facility] [place]      Diagnosis:    ICD-10-CM    1. Alcohol intoxication (H) F10.929    2. Alcohol abuse F10.10      Disposition:  [place] [facility] [place]    Tom Alcantara  1/4/2019   [facility] EMERGENCY DEPARTMENT    Scribe Disclosure:  Tom COCHRAN Chi, am serving as a scribe at 10:12 AM on 1/4/2019 to document services personally performed by Tj Will APRN based on my observations and the provider's statements to me.        Tj Will APRN CNP  01/04/19 6812

## 2019-01-23 ENCOUNTER — HOSPITAL ENCOUNTER (EMERGENCY)
Facility: CLINIC | Age: 34
Discharge: OTHER INSTITUTION | End: 2019-01-23
Attending: EMERGENCY MEDICINE | Admitting: EMERGENCY MEDICINE

## 2019-01-23 VITALS
HEART RATE: 87 BPM | OXYGEN SATURATION: 95 % | TEMPERATURE: 98.4 F | WEIGHT: 170 LBS | DIASTOLIC BLOOD PRESSURE: 80 MMHG | RESPIRATION RATE: 16 BRPM | BODY MASS INDEX: 26.68 KG/M2 | SYSTOLIC BLOOD PRESSURE: 114 MMHG | HEIGHT: 67 IN

## 2019-01-23 DIAGNOSIS — F10.929 ALCOHOLIC INTOXICATION WITH COMPLICATION (H): ICD-10-CM

## 2019-01-23 DIAGNOSIS — F10.10 ALCOHOL ABUSE: ICD-10-CM

## 2019-01-23 LAB
ALCOHOL BREATH TEST: 0.27 (ref 0–0.01)
ANION GAP SERPL CALCULATED.3IONS-SCNC: 5 MMOL/L (ref 3–14)
BUN SERPL-MCNC: 10 MG/DL (ref 7–30)
CALCIUM SERPL-MCNC: 7.3 MG/DL (ref 8.5–10.1)
CHLORIDE SERPL-SCNC: 109 MMOL/L (ref 94–109)
CO2 SERPL-SCNC: 28 MMOL/L (ref 20–32)
CREAT SERPL-MCNC: 0.79 MG/DL (ref 0.66–1.25)
ETHANOL SERPL-MCNC: 0.34 G/DL
GFR SERPL CREATININE-BSD FRML MDRD: >90 ML/MIN/{1.73_M2}
GLUCOSE SERPL-MCNC: 118 MG/DL (ref 70–99)
POTASSIUM SERPL-SCNC: 3.2 MMOL/L (ref 3.4–5.3)
SODIUM SERPL-SCNC: 142 MMOL/L (ref 133–144)

## 2019-01-23 PROCEDURE — 99285 EMERGENCY DEPT VISIT HI MDM: CPT

## 2019-01-23 PROCEDURE — 36415 COLL VENOUS BLD VENIPUNCTURE: CPT | Performed by: EMERGENCY MEDICINE

## 2019-01-23 PROCEDURE — 80320 DRUG SCREEN QUANTALCOHOLS: CPT | Performed by: EMERGENCY MEDICINE

## 2019-01-23 PROCEDURE — 80048 BASIC METABOLIC PNL TOTAL CA: CPT | Performed by: EMERGENCY MEDICINE

## 2019-01-23 ASSESSMENT — ENCOUNTER SYMPTOMS
FEVER: 0
DIARRHEA: 0
ABDOMINAL PAIN: 0
SEIZURES: 0
VOMITING: 0

## 2019-01-23 ASSESSMENT — MIFFLIN-ST. JEOR: SCORE: 1669.74

## 2019-01-23 NOTE — ED PROVIDER NOTES
"  History     Chief Complaint:  Alcohol intoxication     HPI:   The history is provided by the patient. The history is limited by the condition of the patient.      Aly Solitario Jr. is a 33 year old male with a history of alcohol abuse, withdrawal seizures, and pancreatitis who presents to the emergency department for evaluation of alcohol intoxication. The patient reports that he began drinking about two days ago with his last drink being approximately two hours ago. He began to \"feel bad\", like he was about to have another seizure, so he activated EMS and was brought to the emergency department for evaluation. Here, the patient reports that he has been able to take in fluids without vomiting. He has not had any other drugs. He denies any fever, diarrhea, abdominal pain, hematemesis, or recent seizures.     Allergies:  No known drug allergies     Medications:    The patient is not currently taking any prescribed medications.     Past Medical History:    Alcohol abuse   Pancreatitis   Substance abuse   Seizures     Past Surgical History:    Soft tissue surgery, arm with graft due to artery issues     Family History:    Noncontributory     Social History:  Presents via EMS    Tobacco use: 0.50 PPD   Alcohol use: history of abuse   Drug use: Marijuana   Marital Status:  Single      Review of Systems   Constitutional: Negative for fever.   Gastrointestinal: Negative for abdominal pain, diarrhea and vomiting.   Neurological: Negative for seizures.   All other systems reviewed and are negative.  Limited due to alcohol intoxication       Physical Exam     Patient Vitals for the past 24 hrs:   BP Temp Temp src Pulse Resp SpO2 Height Weight   01/23/19 0830 114/80 -- -- 87 -- 95 % -- --   01/23/19 0730 115/83 -- -- 100 -- -- -- --   01/23/19 0725 -- -- -- -- -- 97 % -- --   01/23/19 0720 116/89 -- -- 100 -- 96 % -- --   01/23/19 0555 (!) 143/106 98.4  F (36.9  C) Oral 116 16 96 % 1.702 m (5' 7\") 77.1 kg (170 lb)    "     Physical Exam  General: The patient is in no respiratory distress. Sleepy but arousable.     HENT: Mucous membranes moist.    Cardiovascular: Regular rate and rhythm. Good pulses in all four extremities. Normal capillary refill and skin turgor.     Respiratory: Lungs are clear. No nasal flaring. No retractions. No wheezing, no crackles.    Gastrointestinal: Abdomen soft. No guarding, no rebound. No palpable hernias.     Musculoskeletal: No gross deformity.     Skin: No rashes or petechiae.     Neurologic: The patient is alert and oriented x3. GCS 15. No testable cranial nerve deficit. Follows commands with clear and appropriate speech. Gives appropriate answers. Good strength in all extremities. No gross neurologic deficit. Gross sensation intact. Pupils are round and reactive. No meningismus.     Lymphatic: No cervical adenopathy. No lower extremity swelling.    Psychiatric: The patient is non-tearful.     Emergency Department Course     Laboratory:  BMP: Potassium 3.2 (L), Glucose 118 (H), ow WNL (Creatinine 0.79)   Alcohol level blood: 0.34 (HH)  Alcohol breath test POCT: 0.27     Emergency Department Course:  Past medical records, nursing notes, and vitals reviewed.  0623: I performed an exam of the patient and obtained history, as documented above.    Patient is on an SEBASTIEN hold.     Blood drawn. This was sent to the lab for further testing, results above.     Findings and plan explained to the Patient.    Patient will be transferred to Detox via EMS.     Impression & Plan      Medical Decision Making:  The patient has a long standing history of alcohol abuse and reports that he has had seizures with withdrawal in the past. His alcohol level is 0.3 but clinically he is sober. He reported not feeling well  But has had no known seizure activity. I did a careful exam of him and did not find signs to suggest other significant abnormality such as infection, cardiac issue, arrhythmia. The patient did have a  screening BMP which was reassuring. The patient was sent to Detox to prevent him from withdrawal and he was placed on a hold to facilitate that transfer. Otherwise, as can be determined medically, I do not find any signs of any significant ongoing medical condition and he had no signs or thoughts of hurting himself.     Diagnosis:    ICD-10-CM    1. Alcohol abuse F10.10    2. Alcoholic intoxication with complication (H) F10.929        Disposition:  Transferred via EMS to detox     Scribe Disclosure:   Angel COCHRAN, am serving as a scribe at 6:23 AM on 1/23/2019 to document services personally performed by Mati Angelo MD based on my observations and the provider's statements to me.       Mati Angelo MD  1/23/2019   [facility] EMERGENCY DEPARTMENT       Mati Angelo MD  01/23/19 9220

## 2019-01-23 NOTE — ED NOTES
Pt reports a hx of ETOH abuse and a hx of seizures in the past from ETOH withdrawal. ABCD's intact, Pt A&Ox4. Pt stated he stopped drinking on EMS arrival

## 2019-01-23 NOTE — DISCHARGE INSTRUCTIONS
Discharge Instructions  Alcohol Intoxication    You have been seen today with alcohol intoxication. This means that you have enough alcohol in your system to impair your ability to mentally and physically function. When you are intoxicated, we are not allowed to release you without a sober adult to be with you. You may not drive, operate dangerous equipment, or do anything else dangerous until you are sober.    You may have come to the Emergency Department because of your intoxication, or for another reason, such as because of an injury. No matter what the case is, this visit is a ?red flag? regarding alcohol use, and you should consider whether your drinking pattern is a problem for you.     You may be at risk for alcohol-related problems if:    Men: you drink more than 14 drinks per week, or more than 4 drinks per occasion.    Women: you drink more than 7 drinks per week or more than 3 drinks per occasion.    You have black-outs.  You do things you regret while drinking.  You have legal problems because of drinking (DUI).  You have job problems because of drinking (you call in sick to work because of drinking).    CAGE Questions  Have you ever felt you should cut down on your drinking?  Have people annoyed you by criticizing your drinking?  Have you ever felt bad or guilty about your drinking?  Have you ever had a drink first thing in the morning to steady your nerves or get rid of a hangover (eye opener)?    If you answer yes to any of the CAGE questions, you may have a problem with alcohol.      Return to the Emergency Department if:  You become shaky or tremble when you try to stop drinking.    You have a seizure or pass out.    You throw up (vomit) blood. This may be bright red or it may look like black coffee grounds.    You have blood in the stool. This may be bright red or appear as a black, tarry, bad smelling stool.    You become lightheaded or faint.      For further help, contact:   Your caregiver.     Alcoholics Anonymous (AA).    A drug or alcohol rehabilitation program.    You can get information on alcohol resources and groups by calling the number 211 or 1-586.279.6639 on any phone.       Seek medical care if:  You have persistent vomiting.    You have persistent pain in any part of your body.    You do not feel better after a few days.    If you were given a prescription for medicine here today, be sure to read all of the information (including the package insert) that comes with your prescription.  This will include important information about the medicine, its side effects, and any warnings that you need to know about.  The pharmacist who fills the prescription can provide more information and answer questions you may have about the medicine.  If you have questions or concerns that the pharmacist cannot address, please call or return to the Emergency Department.   Remember that you can always come back to the Emergency Department if you are not able to see your regular doctor in the amount of time listed above, if you get any new symptoms, or if there is anything that worries you.

## 2019-01-23 NOTE — ED NOTES
Inventory of pt belongings complete. Pt aware of SEBASTIEN status, cooperative. Breakfast tray ordered.

## 2019-01-23 NOTE — ED TRIAGE NOTES
Pt called EMS tonight because he felt like he drank too much. Pt reports he had 1.75 of whiskey tonight and would like services to help stop drinking. Pt A&Ox4 ABCD's intact. Pt cooperative during triage, denies SI or HI. Pt ambulates with a steady gait from ambulance bay to room 7

## 2021-01-25 ENCOUNTER — HOSPITAL ENCOUNTER (EMERGENCY)
Facility: CLINIC | Age: 36
Discharge: HOME | End: 2021-01-26
Attending: EMERGENCY MEDICINE | Admitting: EMERGENCY MEDICINE
Payer: OTHER GOVERNMENT

## 2021-01-25 DIAGNOSIS — F10.920 ALCOHOLIC INTOXICATION WITHOUT COMPLICATION (H): ICD-10-CM

## 2021-01-25 DIAGNOSIS — U07.1 2019 NOVEL CORONAVIRUS DISEASE (COVID-19): ICD-10-CM

## 2021-01-25 LAB
ALBUMIN SERPL-MCNC: 3.6 G/DL (ref 3.4–5)
ALP SERPL-CCNC: 96 U/L (ref 40–150)
ALT SERPL W P-5'-P-CCNC: 50 U/L (ref 0–70)
ANION GAP SERPL CALCULATED.3IONS-SCNC: 5 MMOL/L (ref 3–14)
AST SERPL W P-5'-P-CCNC: 55 U/L (ref 0–45)
BASOPHILS # BLD AUTO: 0 10E9/L (ref 0–0.2)
BASOPHILS NFR BLD AUTO: 0.8 %
BILIRUB SERPL-MCNC: 0.2 MG/DL (ref 0.2–1.3)
BUN SERPL-MCNC: 9 MG/DL (ref 7–30)
CALCIUM SERPL-MCNC: 8.4 MG/DL (ref 8.5–10.1)
CHLORIDE SERPL-SCNC: 112 MMOL/L (ref 94–109)
CO2 SERPL-SCNC: 29 MMOL/L (ref 20–32)
CREAT SERPL-MCNC: 0.69 MG/DL (ref 0.66–1.25)
DIFFERENTIAL METHOD BLD: NORMAL
EOSINOPHIL # BLD AUTO: 0 10E9/L (ref 0–0.7)
EOSINOPHIL NFR BLD AUTO: 0.4 %
ERYTHROCYTE [DISTWIDTH] IN BLOOD BY AUTOMATED COUNT: 13.2 % (ref 10–15)
ETHANOL SERPL-MCNC: 0.41 G/DL
GFR SERPL CREATININE-BSD FRML MDRD: >90 ML/MIN/{1.73_M2}
GLUCOSE SERPL-MCNC: 114 MG/DL (ref 70–99)
HCT VFR BLD AUTO: 48.6 % (ref 40–53)
HGB BLD-MCNC: 15.6 G/DL (ref 13.3–17.7)
IMM GRANULOCYTES # BLD: 0 10E9/L (ref 0–0.4)
IMM GRANULOCYTES NFR BLD: 0.2 %
KETONES BLD-SCNC: 0.1 MMOL/L (ref 0–0.6)
LACTATE BLD-SCNC: 1.6 MMOL/L (ref 0.7–2)
LYMPHOCYTES # BLD AUTO: 2.2 10E9/L (ref 0.8–5.3)
LYMPHOCYTES NFR BLD AUTO: 45.8 %
MCH RBC QN AUTO: 28.8 PG (ref 26.5–33)
MCHC RBC AUTO-ENTMCNC: 32.1 G/DL (ref 31.5–36.5)
MCV RBC AUTO: 90 FL (ref 78–100)
MONOCYTES # BLD AUTO: 0.7 10E9/L (ref 0–1.3)
MONOCYTES NFR BLD AUTO: 13.6 %
NEUTROPHILS # BLD AUTO: 1.9 10E9/L (ref 1.6–8.3)
NEUTROPHILS NFR BLD AUTO: 39.2 %
NRBC # BLD AUTO: 0 10*3/UL
NRBC BLD AUTO-RTO: 0 /100
PLATELET # BLD AUTO: 278 10E9/L (ref 150–450)
POTASSIUM SERPL-SCNC: 3.2 MMOL/L (ref 3.4–5.3)
PROT SERPL-MCNC: 8 G/DL (ref 6.8–8.8)
RBC # BLD AUTO: 5.42 10E12/L (ref 4.4–5.9)
SODIUM SERPL-SCNC: 146 MMOL/L (ref 133–144)
WBC # BLD AUTO: 4.8 10E9/L (ref 4–11)

## 2021-01-25 PROCEDURE — 82077 ASSAY SPEC XCP UR&BREATH IA: CPT | Performed by: EMERGENCY MEDICINE

## 2021-01-25 PROCEDURE — 99284 EMERGENCY DEPT VISIT MOD MDM: CPT | Mod: 25

## 2021-01-25 PROCEDURE — 85025 COMPLETE CBC W/AUTO DIFF WBC: CPT | Performed by: EMERGENCY MEDICINE

## 2021-01-25 PROCEDURE — 250N000013 HC RX MED GY IP 250 OP 250 PS 637: Performed by: EMERGENCY MEDICINE

## 2021-01-25 PROCEDURE — 99285 EMERGENCY DEPT VISIT HI MDM: CPT | Mod: 25

## 2021-01-25 PROCEDURE — 250N000011 HC RX IP 250 OP 636: Performed by: EMERGENCY MEDICINE

## 2021-01-25 PROCEDURE — 82010 KETONE BODYS QUAN: CPT | Performed by: EMERGENCY MEDICINE

## 2021-01-25 PROCEDURE — 83605 ASSAY OF LACTIC ACID: CPT | Performed by: EMERGENCY MEDICINE

## 2021-01-25 PROCEDURE — 258N000003 HC RX IP 258 OP 636: Performed by: EMERGENCY MEDICINE

## 2021-01-25 PROCEDURE — 250N000009 HC RX 250: Performed by: EMERGENCY MEDICINE

## 2021-01-25 PROCEDURE — 96365 THER/PROPH/DIAG IV INF INIT: CPT

## 2021-01-25 PROCEDURE — 96366 THER/PROPH/DIAG IV INF ADDON: CPT

## 2021-01-25 PROCEDURE — C9803 HOPD COVID-19 SPEC COLLECT: HCPCS

## 2021-01-25 PROCEDURE — 80053 COMPREHEN METABOLIC PANEL: CPT | Performed by: EMERGENCY MEDICINE

## 2021-01-25 RX ORDER — SODIUM CHLORIDE 9 MG/ML
INJECTION, SOLUTION INTRAVENOUS CONTINUOUS
Status: DISCONTINUED | OUTPATIENT
Start: 2021-01-25 | End: 2021-01-26 | Stop reason: HOSPADM

## 2021-01-25 RX ORDER — POTASSIUM CHLORIDE 1500 MG/1
40 TABLET, EXTENDED RELEASE ORAL ONCE
Status: COMPLETED | OUTPATIENT
Start: 2021-01-25 | End: 2021-01-25

## 2021-01-25 RX ADMIN — MAGNESIUM SULFATE HEPTAHYDRATE: 500 INJECTION, SOLUTION INTRAMUSCULAR; INTRAVENOUS at 22:23

## 2021-01-25 RX ADMIN — POTASSIUM CHLORIDE 40 MEQ: 1500 TABLET, EXTENDED RELEASE ORAL at 23:37

## 2021-01-25 RX ADMIN — SODIUM CHLORIDE 1000 ML: 9 INJECTION, SOLUTION INTRAVENOUS at 22:23

## 2021-01-25 ASSESSMENT — ENCOUNTER SYMPTOMS
COUGH: 1
ABDOMINAL PAIN: 1
SHORTNESS OF BREATH: 1

## 2021-01-26 VITALS
HEART RATE: 93 BPM | OXYGEN SATURATION: 98 % | TEMPERATURE: 96.6 F | SYSTOLIC BLOOD PRESSURE: 128 MMHG | RESPIRATION RATE: 16 BRPM | DIASTOLIC BLOOD PRESSURE: 88 MMHG

## 2021-01-26 LAB
LABORATORY COMMENT REPORT: ABNORMAL
SARS-COV-2 RNA RESP QL NAA+PROBE: POSITIVE
SPECIMEN SOURCE: ABNORMAL

## 2021-01-26 PROCEDURE — 87635 SARS-COV-2 COVID-19 AMP PRB: CPT | Performed by: EMERGENCY MEDICINE

## 2021-01-26 PROCEDURE — 250N000013 HC RX MED GY IP 250 OP 250 PS 637: Performed by: EMERGENCY MEDICINE

## 2021-01-26 RX ORDER — ONDANSETRON 4 MG/1
4 TABLET, ORALLY DISINTEGRATING ORAL EVERY 8 HOURS PRN
Qty: 10 TABLET | Refills: 0 | Status: SHIPPED | OUTPATIENT
Start: 2021-01-26 | End: 2021-01-29

## 2021-01-26 RX ORDER — CHLORDIAZEPOXIDE HYDROCHLORIDE 25 MG/1
25-50 CAPSULE, GELATIN COATED ORAL 3 TIMES DAILY PRN
Qty: 12 CAPSULE | Refills: 0 | Status: ON HOLD | OUTPATIENT
Start: 2021-01-26 | End: 2021-11-13

## 2021-01-26 RX ORDER — LORAZEPAM 1 MG/1
2 TABLET ORAL ONCE
Status: COMPLETED | OUTPATIENT
Start: 2021-01-26 | End: 2021-01-26

## 2021-01-26 RX ADMIN — LORAZEPAM 2 MG: 1 TABLET ORAL at 05:48

## 2021-01-26 NOTE — ED TRIAGE NOTES
Patient presents to ED with family member for evaluation of alcohol intoxication. His family member states that he came home very intoxicated, and not talking. History of alcohol withdrawal seizures.     Patient states that he has a cough and is short of breath.

## 2021-01-26 NOTE — DISCHARGE INSTRUCTIONS
You have covid but your oxygen level has been normal here in emergency department  Return to emergency department if you start to have a lot of shortness of breath or if you have pulse oximeter to monitor your oxygen level, should return if level is <90% on RA    Zofran for nausea as needed  Librium as needed for withdrawal symptoms. No drinking alcohol while taking this medication    Discharge Instructions  COVID-19    COVID-19 is the disease caused by a new coronavirus. The virus spreads from person-to-person primarily by droplets when an infected person coughs or sneezes and the droplet either lands on another person or that other person touches a surface with the droplet on it. There are tests available to diagnose COVID-19. There is no specific treatment or medicine for the disease.    You may have been diagnosed with COVID, may be being tested for COVID and have a pending test result, or may have been exposed to COVID.    Symptoms of COVID-19    Many people have no symptoms or mild symptoms.  Symptoms may usually appear 4 to 5 days (up to 14 days) after contact with a person with COVID-19. Some people will get severe symptoms and pneumonia. Usual symptoms are:     ? Fever  ? Cough  ? Trouble breathing    Less common symptoms are: Headache, body aches, sore throat, sneezing, diarrhea, loss of taste or smell.    Isolation and Quarantine    You were seen because you have symptoms, had an exposure, or had some other concern about possible COVID. The best way to stop the spread of the virus is to avoid contact with others.  Isolation refers to sick people staying away from people who are not sick. A person in quarantine is limiting activity because they were exposed and are waiting to see if they might become sick.    If you test positive for COVID, you should stay home (isolation) for at least 10 days after your symptoms began, and for 24 hours with no fever and improvement of symptoms--whichever is longer. (Your  fever should be gone for 24 hours without using fever-reducing medicine). If you have no symptoms, you should stay home (isolation) for 10 days from the day of the test.    For example, if you have a fever and cough for 6 days, you need to stay home 4 more days with no fever for a total of 10 days. Or, if you have a fever and cough for 10 days, you need to stay home one more day with no fever for a total of 11 days.    If you have a high-risk exposure to COVID (you spent 15 minutes or more within six feet of somebody who has COVID), you should stay home (quarantine) for 14 days. Even if you test negative for COVID, the CDC recommends a 14-day quarantine from the time of your last exposure to that individual. There are options for a shortened (<14 day quarantine) you can review at:    https://www.health.[facility]./diseases/coronavirus/close.html#long    If you have symptoms but a negative test, you should stay at home until you are symptom-free and without fever for 24 hours, using the same judgment you would for when it is safe to return to work/school from strep throat, influenza, or the common cold. If you worsen, you should consider being re-evaluated.    If you are being tested for COVID and your test is pending, you should stay home until you know your test result.    How should I protect myself and others?    Do not go to work or school. Have a friend or relative do your shopping. Do not use public transportation (bus, train) or ridesharing (Lyft, Uber).    Separate yourself from other people in your home. As much as possible, you should stay in one room and away from other people in your home. Also, use a separate bathroom, if possible. Avoid handling pets or other animals while sick.     Wear a facemask if you need to be around other people and cover your mouth and nose with a tissue when you cough or sneeze.     Avoid sharing personal household items. You should not share dishes, drinking glasses,  forks/knives/spoons, towels, or bedding with other people in your home. After using these items, they should be washed with soap and water. Clean parts of your home that are touched often (doorknobs, faucets, countertops, etc.) daily.     Wash your hands often with soap and water for at least 20 seconds or use an alcohol-based hand  containing at least 60% alcohol.     Avoid touching your face.    Treat your symptoms. You can take Acetaminophen (Tylenol) to treat body aches and fever as needed for comfort. Ibuprofen (Advil or Motrin) can be used as well if you still have symptoms after taking Tylenol. Drink fluids. Rest.    Watch for worsening symptoms such as shortness of breath/difficulty breathing or very severe weakness.    Employers/workplaces are being asked by the Centers for Disease Control (CDC) to not request notes/documentation for you to return to work or prove that you were ill. You may choose to show your employer this paperwork. Also, repeat testing should not be required to return to work.    Return to the Emergency Department if:    If you are developing worsening breathing, shortness of breath, or feel worse you should seek medical attention.  If you are uncertain, contact your health care provider/clinic. If you need emergency medical attention, call 911 and tell them you have been ill.

## 2021-01-26 NOTE — ED PROVIDER NOTES
Patient signed out to me pending re-evaluation    Patient intoxicated.   No detox beds available    No mental health concerns    0005: Patient sleeping. Will re-evaluate once sober    Covid swab ordered by Dr. Myles came back positive for covid. Patient informed of this. He is not hypoxic and breathing comfortably on room air. No additional workup indicated in ED. Discussed isolation at home and return precautions    Patient sleeping for most of night, woke up and appears clinically sober. Does not appear to be withdrawing at this time but he is concerned about withdrawal. Gave ativan po. No detox beds available and no indication for inpatient admission. Limited supply of librium for home to help with withdrawal symptoms if he plans on stopping alcohol use to help with withdrawal symptoms if they develop. Discussed no alcohol while using this medication     Kat Keller MD  01/26/21 2360

## 2021-01-26 NOTE — ED PROVIDER NOTES
History   Chief Complaint:  Alcohol Intoxication, Shortness of Breath, and Cough      HPI   Aly Solitario Jr. is a 35 year old male with history of alcohol abuse and panreatitis who presents for evaluation of alcohol intoxication, shortness of breath, and a cough. The patient is currently living with his sister and brother-in-law and he was brought into the ED today by his brother-in-law with concern that he is severely intoxicated after drinking 18 12 ounce cans of Coors. Here in the ED, the patient complains of feeling short of breath with a cough and also having abdominal pain. He does reportedly have a history of alcohol withdrawal seizures.     Review of Systems   Respiratory: Positive for cough and shortness of breath.    Gastrointestinal: Positive for abdominal pain.   Psychiatric/Behavioral:        (+) Alcohol intoxication    All other systems reviewed and are negative.      Allergies:  No known drug allergies     Medications:  The patient is not currently taking any prescribed medications.      Past Medical History:    Alcohol abuse  Pancreatitis     Past Surgical History:    Soft tissue surgery, arm       Social History:  The patient was brought into the ED by his brother-in-law.   The patient is currently living with his sister and brother-in-law.   The patient has a history of alcohol abuse and reports that he drank 18 12 ounce cans of Coors today.     Physical Exam     Patient Vitals for the past 24 hrs:   BP Temp Temp src Pulse Resp SpO2   01/25/21 2250 119/87 -- -- 85 -- --   01/25/21 2230 (!) 128/96 -- -- 87 -- 94 %   01/25/21 2220 (!) 131/92 -- -- 94 -- 95 %   01/25/21 2130 (!) 129/97 -- -- 96 -- 94 %   01/25/21 2116 (!) 152/109 96.6  F (35.9  C) Temporal 112 16 98 %       Physical Exam  General: Resting comfortably on the gurney. Intoxicated. Slow in responses. Speech is slurred.   Head:  The scalp, face, and head appear normal  Eyes:  The pupils are equal, round, and reactive to light  ENT:     Tympanic membranes are normal    The oropharynx is normal.    CV:                  Regular rate and rhythm.  Neck:              No meningismus    No mumur  Resp:  Lungs are clear. Normal, non-labored respirations.    No rales, rhonchi, or wheezing.  GI:  Abdomen is soft, no rigidity. A little tenderness in the mid abdomen.     No rebound or guarding.    No distension.  MS:  Normal muscular tone.      No leg swelling.      No calf tenderness.   Skin:  No rash or lesions noted.  Neuro: Speech is normal    No focal neurological deficits detected  Psych:  Awake. Alert.  Normal affect.      Appropriate interactions.  Lymph: No anterior or posterior cervical lymphadenopathy noted.    Emergency Department Course      Laboratory:  CBC: WNL (WBC 4.8, HGB 15.6, )    CMP:  high, Potassium 3.2 low, Chloride 112 high, Glucose 114 high, Calcium 8.4 low, o/w WNL (Creatinine 0.69)   Alcohol level blood: 0.41 high   Ketone beta-hydroxybutyrate quantitative: 0.1   Lactic acid whole blood: 1.6      Emergency Department Course:  Reviewed:  I reviewed nursing notes, vitals, past medical history and care everywhere    Assessments:  2145: I obtained history and examined the patient as noted above.   2243: I spoke to the patient's sister over the phone.      Interventions:  2223 NS 1,000 mL IV   2223 NS 1,000 mL with infuvite adult 10 mL, thiamine 100 mg, folic acid 1 mg, magnesium sulfate 2 g IV     Disposition:  Care of the patient was transferred to my colleague Dr. Keller pending sober evaluation and disposition.       Impression & Plan     Medical Decision Making:  Aly avendaño is a 35-year-old male with a history of alcohol abuse and dependency who presents with alcohol intoxication.  He is not acidotic.  No evidence of pancreatitis.  Mild hypokalemia and is being given potassium orally.  His blood alcohol is 0.40.  His family does not want to detox him at home.  Therefore the plan will be admit the patient to  detox.  Currently he is on an SEBASTIEN.  If the patient becomes clinically sober before a detox bed opens he may be discharged on his own recognizance.     Covid-19  Aly Solitario Jr. was evaluated during a global COVID-19 pandemic, which necessitated consideration that the patient might be at risk for infection with the SARS-CoV-2 virus that causes COVID-19.   Applicable protocols for evaluation were followed during the patient's care.   COVID-19 was considered as part of the patient's evaluation. The plan for testing is:  a test was obtained during this visit.      Diagnosis:    ICD-10-CM    1. Alcoholic intoxication without complication (H)  F10.920        Scribe Disclosure:  I, Ihsan Isaac, am serving as a scribe at 9:45 PM on 1/25/2021 to document services personally performed by Dr. Myles, based on my observations and the provider's statements to me.         Gil Myles MD  01/26/21 0035

## 2021-01-27 ENCOUNTER — HOSPITAL ENCOUNTER (EMERGENCY)
Facility: CLINIC | Age: 36
Discharge: HOME | End: 2021-01-27
Attending: EMERGENCY MEDICINE | Admitting: EMERGENCY MEDICINE
Payer: OTHER GOVERNMENT

## 2021-01-27 VITALS
RESPIRATION RATE: 18 BRPM | HEART RATE: 111 BPM | OXYGEN SATURATION: 96 % | DIASTOLIC BLOOD PRESSURE: 92 MMHG | TEMPERATURE: 98.9 F | SYSTOLIC BLOOD PRESSURE: 121 MMHG

## 2021-01-27 DIAGNOSIS — F10.920 ALCOHOLIC INTOXICATION WITHOUT COMPLICATION (H): ICD-10-CM

## 2021-01-27 DIAGNOSIS — U07.1 2019 NOVEL CORONAVIRUS DISEASE (COVID-19): Primary | ICD-10-CM

## 2021-01-27 LAB
ANION GAP SERPL CALCULATED.3IONS-SCNC: 5 MMOL/L (ref 3–14)
BUN SERPL-MCNC: 8 MG/DL (ref 7–30)
CALCIUM SERPL-MCNC: 7.9 MG/DL (ref 8.5–10.1)
CHLORIDE SERPL-SCNC: 111 MMOL/L (ref 94–109)
CO2 SERPL-SCNC: 29 MMOL/L (ref 20–32)
CREAT SERPL-MCNC: 0.75 MG/DL (ref 0.66–1.25)
ERYTHROCYTE [DISTWIDTH] IN BLOOD BY AUTOMATED COUNT: 13 % (ref 10–15)
ETHANOL SERPL-MCNC: 0.48 G/DL
GFR SERPL CREATININE-BSD FRML MDRD: >90 ML/MIN/{1.73_M2}
GLUCOSE SERPL-MCNC: 109 MG/DL (ref 70–99)
HCT VFR BLD AUTO: 50.4 % (ref 40–53)
HGB BLD-MCNC: 16.7 G/DL (ref 13.3–17.7)
MCH RBC QN AUTO: 29.1 PG (ref 26.5–33)
MCHC RBC AUTO-ENTMCNC: 33.1 G/DL (ref 31.5–36.5)
MCV RBC AUTO: 88 FL (ref 78–100)
PLATELET # BLD AUTO: 277 10E9/L (ref 150–450)
POTASSIUM SERPL-SCNC: 3.5 MMOL/L (ref 3.4–5.3)
RBC # BLD AUTO: 5.74 10E12/L (ref 4.4–5.9)
SODIUM SERPL-SCNC: 145 MMOL/L (ref 133–144)
WBC # BLD AUTO: 4.7 10E9/L (ref 4–11)

## 2021-01-27 PROCEDURE — 258N000003 HC RX IP 258 OP 636: Performed by: EMERGENCY MEDICINE

## 2021-01-27 PROCEDURE — 80048 BASIC METABOLIC PNL TOTAL CA: CPT | Performed by: EMERGENCY MEDICINE

## 2021-01-27 PROCEDURE — 96361 HYDRATE IV INFUSION ADD-ON: CPT

## 2021-01-27 PROCEDURE — 99284 EMERGENCY DEPT VISIT MOD MDM: CPT | Mod: 25

## 2021-01-27 PROCEDURE — 96374 THER/PROPH/DIAG INJ IV PUSH: CPT

## 2021-01-27 PROCEDURE — 85027 COMPLETE CBC AUTOMATED: CPT | Performed by: EMERGENCY MEDICINE

## 2021-01-27 PROCEDURE — 82077 ASSAY SPEC XCP UR&BREATH IA: CPT | Performed by: EMERGENCY MEDICINE

## 2021-01-27 PROCEDURE — 250N000011 HC RX IP 250 OP 636: Performed by: EMERGENCY MEDICINE

## 2021-01-27 RX ORDER — LIDOCAINE 40 MG/G
CREAM TOPICAL
Status: DISCONTINUED | OUTPATIENT
Start: 2021-01-27 | End: 2021-01-28 | Stop reason: HOSPADM

## 2021-01-27 RX ORDER — ONDANSETRON 2 MG/ML
4 INJECTION INTRAMUSCULAR; INTRAVENOUS ONCE
Status: COMPLETED | OUTPATIENT
Start: 2021-01-27 | End: 2021-01-27

## 2021-01-27 RX ADMIN — ONDANSETRON 4 MG: 2 INJECTION INTRAMUSCULAR; INTRAVENOUS at 22:34

## 2021-01-27 RX ADMIN — SODIUM CHLORIDE 1000 ML: 9 INJECTION, SOLUTION INTRAVENOUS at 13:38

## 2021-01-27 NOTE — ED PROVIDER NOTES
History     Chief Complaint:  Alcohol Intoxication and Covid Concern       History limited by: Alcohol intoxication.     Aly Solitario Jr. is a 35 year old male with a history of alcohol abuse who presents for evaluation of alcohol intoxication via EMS. Per EMS, the patient was found in a hotel room when staff knocked on the door and he was not answering. They found an empty 1 liter bottle of vodka in his room. The patient was here 24 hours ago with the same issue. He is COVID positive. The patient denies any suicidal or homicidal ideations, chest pain, shortness of breath, nausea, vomiting, abdominal pain, trauma, or falls. He also denies tobacco use, drugs, alcohol, or other substances today.    Review of Systems   All other systems reviewed and are negative.  ROS limited due to alcohol intoxication.     Allergies:  No Known Allergies    Medications:   Librium  Zofran     Medical History:   Alcohol abuse   Pancreatitis   Substance abuse     Past Surgical History:    Soft tissue surgery     Social History:  The patient was accompanied to the ED by EMS  Alcohol Use: Positive  Physical Exam     Patient Vitals for the past 24 hrs:   BP Temp Temp src Pulse Resp SpO2   01/27/21 2200 (!) 134/101 -- -- 89 -- 97 %   01/27/21 2145 (!) 126/103 -- -- 99 -- 97 %   01/27/21 2130 (!) 149/113 -- -- -- -- 96 %   01/27/21 2115 -- -- -- -- -- 100 %   01/27/21 2100 -- -- -- -- -- 92 %   01/27/21 2045 -- -- -- -- -- 98 %   01/27/21 2030 -- -- -- -- -- 90 %   01/27/21 2015 -- -- -- -- -- 97 %   01/27/21 2000 -- -- -- -- -- 91 %   01/27/21 1945 -- -- -- -- -- 91 %   01/27/21 1930 -- -- -- -- -- (!) 84 %   01/27/21 1915 -- -- -- -- -- (!) 89 %   01/27/21 1900 -- -- -- -- -- 97 %   01/27/21 1845 114/87 -- -- 100 -- 97 %   01/27/21 1830 112/84 -- -- 88 -- 94 %   01/27/21 1815 (!) 128/91 -- -- 96 -- 98 %   01/27/21 1800 (!) 142/108 -- -- 90 -- 96 %   01/27/21 1745 (!) 123/94 -- -- 93 -- 95 %   01/27/21 1730 (!) 135/101 -- -- 80 -- 97 %    01/27/21 1715 (!) 136/99 -- -- 83 -- 95 %   01/27/21 1700 (!) 112/90 -- -- 80 -- 95 %   01/27/21 1645 118/86 -- -- 79 -- 95 %   01/27/21 1630 (!) 127/100 -- -- 91 -- 95 %   01/27/21 1615 121/77 -- -- 100 -- --   01/27/21 1600 108/84 -- -- 81 -- 91 %   01/27/21 1545 (!) 123/103 -- -- 107 -- 98 %   01/27/21 1544 -- -- -- -- -- 99 %   01/27/21 1543 -- -- -- -- -- 97 %   01/27/21 1542 -- -- -- -- -- 94 %   01/27/21 1530 120/86 -- -- 104 -- --   01/27/21 1515 126/83 -- -- 102 -- --   01/27/21 1500 121/86 -- -- 86 -- --   01/27/21 1332 -- -- -- -- -- 98 %   01/27/21 1325 (!) 142/107 98.9  F (37.2  C) Temporal 102 18 --      Physical Exam  General: Resting on the bed.  Sleeping but arouses to voice   Head: No obvious trauma to head.  Ears, Nose, Throat:  External ears normal.  Nose normal.  Clear TMs  Eyes:  Conjunctivae clear.  Pupils are equal, round, and reactive.   Neck: Normal range of motion.  Neck supple.  Non tender c spine   CV: Regular rate and rhythm.  No murmurs.      Respiratory: Effort normal and breath sounds normal.  No wheezing or crackles.   Gastrointestinal: Soft.  No distension. There is no tenderness.  There is no rigidity, no rebound and no guarding.   Neuro: Alert. Moving all extremities appropriately.  Slurred speech.    Skin: Skin is warm and dry.  No rash noted.     Emergency Department Course   Laboratory:  CBC: WBC 4.7, HGB 16.7,   BMP: Na 145 (H), chloride 111 (H), glucose 109 (H), calcium 7.9 (L) o/w WNL (Creatinine 0.75)  Alcohol level blood: 0. 48 (H)    Emergency Department Course:  Reviewed:  1407 I reviewed nursing notes, vitals, past medical history and care everywhere    Assessments:  1444 The patient was placed on a SEBASTIEN.    2020 Patient rechecked and updated.      Interventions:  1338 0.9% NS 1000 mL IV    Disposition:  Care of the patient was transferred to my colleague Dr. Angelo.   Impression & Plan   Covid-19  Aly Solitario Jr. was evaluated during a global COVID-19  pandemic, which necessitated consideration that the patient might be at risk for infection with the SARS-CoV-2 virus that causes COVID-19.   Applicable protocols for evaluation were followed during the patient's care.   COVID-19 was considered as part of the patient's evaluation. The plan for testing is:  a test was obtained at a previous visit and reviewed & considered today.    Medical Decision Makin year old male presents with alcohol intoxication.  Vital signs are stable.  Broad differential was pursued include not limited to trauma, ingestion, intoxication, electrolyte, metabolic, renal dysfunction, etc.  Patient reports no mental health concerns.  He denies any coingestions.  He reports drinking alcohol alone.  CBC shows no leukocytosis or anemia.  BMP shows no acute electrolyte, metabolic or renal dysfunction.  Low concern for toxic alcohols or other co ingestions.  Alcohol level is significantly elevated 0.48 consistent with patient's ingestion.  There is no trauma on head to toe examination.  Patient was observed in the emergency room and was allowed to become clinically sober.  He was reassessed and looking better.  No signs of withdrawal at this time.  Reports that he feels shaky but there is no tongue fasciculation, vital signs are stable.  I do not see evidence of withdrawal at this time.  Given his history of alcohol abuse, recent intoxication, I do not feel comfortable with giving medications to help with withdrawal.  He does not require them in the emergency department but I do not feel comfortable with outpatient prescription for these.  He denies any acute mental health concerns.  He was able to get a safe ride home.  Patient was discharged to home with his safe ride.    Diagnosis:     ICD-10-CM    1. Alcoholic intoxication without complication (H)  F10.920         Discharge Medications:  New Prescriptions    No medications on file     Scribe Disclosure:  Michelle COCHRAN, am serving as a  scribe at 2:07 PM on 1/27/2021 to document services personally performed by Ronel Robles based on my observations and the provider's statements to me.     [facility]     Ronel Robles MD  01/27/21 5413       Ronel Robles MD  01/27/21 6431

## 2021-01-27 NOTE — ED NOTES
Bed: ED10  Expected date:   Expected time:   Means of arrival:   Comments:  [facility], ETOH Covid (+)

## 2021-01-27 NOTE — ED TRIAGE NOTES
Hotel staff found patient when he was not answering knock to door. Pt was here 24 hrs ago for same issue . Covid positive and ETOH. Pt. Had empty 1 liter bottle of vodka in room

## 2021-01-28 ENCOUNTER — PATIENT OUTREACH (OUTPATIENT)
Dept: CARE COORDINATION | Facility: CLINIC | Age: 36
End: 2021-01-28

## 2021-01-28 NOTE — ED NOTES
Clothes, wallet and bottle of 12 librium capsules bagged, double checked with other RN and placed in locker 51

## 2021-01-28 NOTE — PROGRESS NOTES
Clinic Care Coordination Contact  [facility]/Voicemail       Clinical Data: Care Coordinator Outreach  Outreach attempted x 1.  Left message on patient's voicemail with call back information and requested return call.  Plan:. Care Coordinator will try to reach patient again in 1-2 business days.

## 2021-01-29 ENCOUNTER — HOSPITAL ENCOUNTER (OUTPATIENT)
Facility: CLINIC | Age: 36
Setting detail: OBSERVATION
Discharge: OTHER INSTITUTION | End: 2021-01-29
Attending: EMERGENCY MEDICINE | Admitting: INTERNAL MEDICINE
Payer: OTHER GOVERNMENT

## 2021-01-29 VITALS
OXYGEN SATURATION: 94 % | DIASTOLIC BLOOD PRESSURE: 80 MMHG | HEART RATE: 112 BPM | TEMPERATURE: 98.3 F | RESPIRATION RATE: 18 BRPM | SYSTOLIC BLOOD PRESSURE: 122 MMHG

## 2021-01-29 DIAGNOSIS — F32.A DEPRESSION, UNSPECIFIED DEPRESSION TYPE: ICD-10-CM

## 2021-01-29 DIAGNOSIS — F10.220 ACUTE ALCOHOLIC INTOXICATION IN ALCOHOLISM WITHOUT COMPLICATION (H): ICD-10-CM

## 2021-01-29 DIAGNOSIS — U07.1 INFECTION DUE TO 2019 NOVEL CORONAVIRUS: ICD-10-CM

## 2021-01-29 LAB
ALBUMIN SERPL-MCNC: 4 G/DL (ref 3.4–5)
ALP SERPL-CCNC: 129 U/L (ref 40–150)
ALT SERPL W P-5'-P-CCNC: 67 U/L (ref 0–70)
ANION GAP SERPL CALCULATED.3IONS-SCNC: 15 MMOL/L (ref 3–14)
AST SERPL W P-5'-P-CCNC: 63 U/L (ref 0–45)
BASOPHILS # BLD AUTO: 0 10E9/L (ref 0–0.2)
BASOPHILS NFR BLD AUTO: 0.2 %
BILIRUB SERPL-MCNC: 0.6 MG/DL (ref 0.2–1.3)
BUN SERPL-MCNC: 20 MG/DL (ref 7–30)
CALCIUM SERPL-MCNC: 8.5 MG/DL (ref 8.5–10.1)
CHLORIDE SERPL-SCNC: 105 MMOL/L (ref 94–109)
CO2 SERPL-SCNC: 20 MMOL/L (ref 20–32)
CREAT SERPL-MCNC: 0.84 MG/DL (ref 0.66–1.25)
DIFFERENTIAL METHOD BLD: ABNORMAL
EOSINOPHIL # BLD AUTO: 0 10E9/L (ref 0–0.7)
EOSINOPHIL NFR BLD AUTO: 0.1 %
ERYTHROCYTE [DISTWIDTH] IN BLOOD BY AUTOMATED COUNT: 12.9 % (ref 10–15)
ETHANOL SERPL-MCNC: 0.44 G/DL
GFR SERPL CREATININE-BSD FRML MDRD: >90 ML/MIN/{1.73_M2}
GLUCOSE SERPL-MCNC: 75 MG/DL (ref 70–99)
HCT VFR BLD AUTO: 52.2 % (ref 40–53)
HGB BLD-MCNC: 17.5 G/DL (ref 13.3–17.7)
IMM GRANULOCYTES # BLD: 0 10E9/L (ref 0–0.4)
IMM GRANULOCYTES NFR BLD: 0.3 %
LYMPHOCYTES # BLD AUTO: 1.4 10E9/L (ref 0.8–5.3)
LYMPHOCYTES NFR BLD AUTO: 13.3 %
MCH RBC QN AUTO: 29.3 PG (ref 26.5–33)
MCHC RBC AUTO-ENTMCNC: 33.5 G/DL (ref 31.5–36.5)
MCV RBC AUTO: 87 FL (ref 78–100)
MONOCYTES # BLD AUTO: 0.3 10E9/L (ref 0–1.3)
MONOCYTES NFR BLD AUTO: 3 %
NEUTROPHILS # BLD AUTO: 8.7 10E9/L (ref 1.6–8.3)
NEUTROPHILS NFR BLD AUTO: 83.1 %
NRBC # BLD AUTO: 0 10*3/UL
NRBC BLD AUTO-RTO: 0 /100
PLATELET # BLD AUTO: 292 10E9/L (ref 150–450)
POTASSIUM SERPL-SCNC: 3.4 MMOL/L (ref 3.4–5.3)
PROT SERPL-MCNC: 8.9 G/DL (ref 6.8–8.8)
RBC # BLD AUTO: 5.97 10E12/L (ref 4.4–5.9)
SODIUM SERPL-SCNC: 140 MMOL/L (ref 133–144)
WBC # BLD AUTO: 10.5 10E9/L (ref 4–11)

## 2021-01-29 PROCEDURE — G0378 HOSPITAL OBSERVATION PER HR: HCPCS

## 2021-01-29 PROCEDURE — 80053 COMPREHEN METABOLIC PANEL: CPT | Performed by: EMERGENCY MEDICINE

## 2021-01-29 PROCEDURE — 96374 THER/PROPH/DIAG INJ IV PUSH: CPT

## 2021-01-29 PROCEDURE — 85025 COMPLETE CBC W/AUTO DIFF WBC: CPT | Performed by: EMERGENCY MEDICINE

## 2021-01-29 PROCEDURE — 90791 PSYCH DIAGNOSTIC EVALUATION: CPT

## 2021-01-29 PROCEDURE — 99219 PR INITIAL OBSERVATION CARE,LEVEL II: CPT | Performed by: INTERNAL MEDICINE

## 2021-01-29 PROCEDURE — 250N000011 HC RX IP 250 OP 636: Performed by: EMERGENCY MEDICINE

## 2021-01-29 PROCEDURE — 258N000003 HC RX IP 258 OP 636: Performed by: EMERGENCY MEDICINE

## 2021-01-29 PROCEDURE — 96361 HYDRATE IV INFUSION ADD-ON: CPT

## 2021-01-29 PROCEDURE — 82077 ASSAY SPEC XCP UR&BREATH IA: CPT | Performed by: EMERGENCY MEDICINE

## 2021-01-29 PROCEDURE — 99285 EMERGENCY DEPT VISIT HI MDM: CPT | Mod: 25

## 2021-01-29 RX ORDER — DIAZEPAM 10 MG/2ML
5-10 INJECTION, SOLUTION INTRAMUSCULAR; INTRAVENOUS EVERY 30 MIN PRN
Status: CANCELLED | OUTPATIENT
Start: 2021-01-29

## 2021-01-29 RX ORDER — AMOXICILLIN 250 MG
2 CAPSULE ORAL 2 TIMES DAILY PRN
Status: CANCELLED | OUTPATIENT
Start: 2021-01-29

## 2021-01-29 RX ORDER — FLUMAZENIL 0.1 MG/ML
0.2 INJECTION, SOLUTION INTRAVENOUS
Status: CANCELLED | OUTPATIENT
Start: 2021-01-29

## 2021-01-29 RX ORDER — AMOXICILLIN 250 MG
1 CAPSULE ORAL 2 TIMES DAILY PRN
Status: CANCELLED | OUTPATIENT
Start: 2021-01-29

## 2021-01-29 RX ORDER — DIAZEPAM 5 MG
10 TABLET ORAL EVERY 30 MIN PRN
Status: CANCELLED | OUTPATIENT
Start: 2021-01-29

## 2021-01-29 RX ORDER — LANOLIN ALCOHOL/MO/W.PET/CERES
100 CREAM (GRAM) TOPICAL DAILY
Status: CANCELLED | OUTPATIENT
Start: 2021-02-06

## 2021-01-29 RX ORDER — GABAPENTIN 300 MG/1
900 CAPSULE ORAL EVERY 8 HOURS
Status: CANCELLED | OUTPATIENT
Start: 2021-01-30 | End: 2021-02-02

## 2021-01-29 RX ORDER — LORAZEPAM 2 MG/ML
2 INJECTION INTRAMUSCULAR ONCE
Status: COMPLETED | OUTPATIENT
Start: 2021-01-29 | End: 2021-01-29

## 2021-01-29 RX ORDER — ONDANSETRON 4 MG/1
4 TABLET, ORALLY DISINTEGRATING ORAL EVERY 6 HOURS PRN
Status: CANCELLED | OUTPATIENT
Start: 2021-01-29

## 2021-01-29 RX ORDER — LORAZEPAM 2 MG/ML
INJECTION INTRAMUSCULAR
Status: DISCONTINUED
Start: 2021-01-29 | End: 2021-01-29 | Stop reason: HOSPADM

## 2021-01-29 RX ORDER — LANOLIN ALCOHOL/MO/W.PET/CERES
200 CREAM (GRAM) TOPICAL 3 TIMES DAILY
Status: CANCELLED | OUTPATIENT
Start: 2021-01-29 | End: 2021-01-31

## 2021-01-29 RX ORDER — ACETAMINOPHEN 325 MG/1
650 TABLET ORAL EVERY 4 HOURS PRN
Status: CANCELLED | OUTPATIENT
Start: 2021-01-29

## 2021-01-29 RX ORDER — ONDANSETRON 2 MG/ML
4 INJECTION INTRAMUSCULAR; INTRAVENOUS EVERY 6 HOURS PRN
Status: CANCELLED | OUTPATIENT
Start: 2021-01-29

## 2021-01-29 RX ORDER — LANOLIN ALCOHOL/MO/W.PET/CERES
100 CREAM (GRAM) TOPICAL 3 TIMES DAILY
Status: CANCELLED | OUTPATIENT
Start: 2021-01-31 | End: 2021-02-05

## 2021-01-29 RX ORDER — GABAPENTIN 300 MG/1
600 CAPSULE ORAL EVERY 8 HOURS
Status: CANCELLED | OUTPATIENT
Start: 2021-02-02 | End: 2021-02-04

## 2021-01-29 RX ORDER — GABAPENTIN 100 MG/1
100 CAPSULE ORAL EVERY 8 HOURS
Status: CANCELLED | OUTPATIENT
Start: 2021-02-06 | End: 2021-02-09

## 2021-01-29 RX ORDER — ACETAMINOPHEN 650 MG/1
650 SUPPOSITORY RECTAL EVERY 4 HOURS PRN
Status: CANCELLED | OUTPATIENT
Start: 2021-01-29

## 2021-01-29 RX ORDER — GABAPENTIN 600 MG/1
1200 TABLET ORAL ONCE
Status: CANCELLED | OUTPATIENT
Start: 2021-01-29 | End: 2021-01-29

## 2021-01-29 RX ORDER — MULTIPLE VITAMINS W/ MINERALS TAB 9MG-400MCG
1 TAB ORAL DAILY
Status: CANCELLED | OUTPATIENT
Start: 2021-01-29

## 2021-01-29 RX ORDER — PROCHLORPERAZINE MALEATE 10 MG
10 TABLET ORAL EVERY 6 HOURS PRN
Status: CANCELLED | OUTPATIENT
Start: 2021-01-29

## 2021-01-29 RX ORDER — HALOPERIDOL 5 MG/ML
2.5-5 INJECTION INTRAMUSCULAR EVERY 6 HOURS PRN
Status: CANCELLED | OUTPATIENT
Start: 2021-01-29

## 2021-01-29 RX ORDER — OLANZAPINE 5 MG/1
5-10 TABLET, ORALLY DISINTEGRATING ORAL EVERY 6 HOURS PRN
Status: CANCELLED | OUTPATIENT
Start: 2021-01-29

## 2021-01-29 RX ORDER — CLONIDINE HYDROCHLORIDE 0.1 MG/1
0.1 TABLET ORAL EVERY 8 HOURS
Status: CANCELLED | OUTPATIENT
Start: 2021-01-29

## 2021-01-29 RX ORDER — FOLIC ACID 1 MG/1
1 TABLET ORAL DAILY
Status: CANCELLED | OUTPATIENT
Start: 2021-01-29

## 2021-01-29 RX ORDER — IBUPROFEN 600 MG/1
600 TABLET, FILM COATED ORAL EVERY 6 HOURS PRN
Status: CANCELLED | OUTPATIENT
Start: 2021-01-29

## 2021-01-29 RX ORDER — GABAPENTIN 300 MG/1
300 CAPSULE ORAL EVERY 8 HOURS
Status: CANCELLED | OUTPATIENT
Start: 2021-02-04 | End: 2021-02-06

## 2021-01-29 RX ORDER — PROCHLORPERAZINE 25 MG
25 SUPPOSITORY, RECTAL RECTAL EVERY 12 HOURS PRN
Status: CANCELLED | OUTPATIENT
Start: 2021-01-29

## 2021-01-29 RX ADMIN — SODIUM CHLORIDE 1000 ML: 9 INJECTION, SOLUTION INTRAVENOUS at 13:38

## 2021-01-29 RX ADMIN — LORAZEPAM 2 MG: 2 INJECTION INTRAMUSCULAR; INTRAVENOUS at 20:53

## 2021-01-29 NOTE — PROGRESS NOTES
Clinic Care Coordination Contact  [facility]/Voicemail       Clinical Data: Care Coordinator Outreach  Outreach attempted x 2.  Left message on patient's voicemail with call back information and requested return call.  Plan: Care Coordinator will do no further outreaches at this time.

## 2021-01-29 NOTE — DISCHARGE INSTRUCTIONS

## 2021-01-29 NOTE — ED TRIAGE NOTES
"Presents to ED via EMS for alcohol intoxication. [facility] lives at [facility] [place], and [facility] staff called EMS due to patient's emotional behavior. Hx of depression and alcohol use. EMS states patient just lost his brother as well. Pt is cooperative and not on a hold. EMS reports they searched patient and all he had on him was his oral chlordiazepoxide which was secured in locker 46. Pt emotional, stating \"I'm so sorry.\" ABCs intact.  "
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
## 2021-01-29 NOTE — ED NOTES
DATE:  1/29/2021   TIME OF RECEIPT FROM LAB:  7221  LAB TEST:  ethanol  LAB VALUE:  0.44  RESULTS GIVEN WITH READ-BACK TO (PROVIDER):  Dr. Moseley  TIME LAB VALUE REPORTED TO PROVIDER:   7017
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
## 2021-01-30 NOTE — ED NOTES
Pt evaluated by DEC. No acute suicidal thoughts to warrant hold and psych admission. He is covid+. Asymptomatic. He desires detox but no facility will admit covid + patients. He states he's been kicked out of the hotel and his sister house. He has withdrawal seizures. He desires to stop using alcohol and want our help. After multiple discussions, we obtained a bed at [facility] for covid+ unit. They will admit him for treatment.     Megan Staples MD  01/29/21 1939

## 2021-01-30 NOTE — ED NOTES
[facility]  ED Nurse Handoff Report    Aly Solitario Jr. is a 35 year old male   ED Chief complaint: Alcohol Intoxication  . ED Diagnosis:   Final diagnoses:   Acute alcoholic intoxication in alcoholism without complication (H)   Depression, unspecified depression type   Infection due to 2019 novel coronavirus     Allergies: No Known Allergies    Code Status: Full Code  Activity level - Baseline/Home:  Independent. Activity Level - Current:   Assist X 1. Lift room needed: No. Bariatric: No   Needed: No   Isolation: Yes. Infection: Not Applicable  COVID r/o and special precautions.     Vital Signs:   Vitals:    01/29/21 1445 01/29/21 1600 01/29/21 1700 01/29/21 1800   BP: 122/79 116/83 (!) 121/90 (!) 138/94   Pulse: 101 101 103 120   Resp:       Temp:       TempSrc:       SpO2: 94% 91% 94% 97%       Cardiac Rhythm:  ,      Pain level:    Patient confused: No. Patient Falls Risk: Yes.   Elimination Status: Has voided   Patient Report - Initial Complaint: Alcohol Intoxication. Focused Assessment:  Aly Solitario Jr. is a 35 year old male with history of alcohol abuse, hypertension, and withdrawal seizures who presents with alcohol intoxication.  Per EMS, the patient currently lives at a hotel, and the hotel staff called EMS due to the patient's emotional behavior and intoxication.  EMS states that the patient just lost his brother and witnessed his suicide.  EMS also searched the patient and all he had on him once his oral chlordiazepoxide which was secured in the locker.  On arrival, the patient is very emotional and clearly intoxicated.  Patient has a history of alcohol abuse and withdrawal and has been seen in the ED multiple times this year for alcoholic intoxication, most recently 2 days ago.  He was also seen in the ED 4 days ago for the same issue, where he tested positive for COVID-19.  Tests Performed:   Labs Ordered and Resulted from Time of ED Arrival Up to the Time of Departure  from the ED   CBC WITH PLATELETS DIFFERENTIAL - Abnormal; Notable for the following components:       Result Value    RBC Count 5.97 (*)     Absolute Neutrophil 8.7 (*)     All other components within normal limits   ALCOHOL ETHYL - Abnormal; Notable for the following components:    Ethanol g/dL 0.44 (*)     All other components within normal limits   COMPREHENSIVE METABOLIC PANEL - Abnormal; Notable for the following components:    Anion Gap 15 (*)     Protein Total 8.9 (*)     AST 63 (*)     All other components within normal limits   DOCUMENT IN LEGAL HOLD NAVIGATOR   VITAL SIGNS   PATIENT CARE ORDER   NOTIFY PROVIDER     No orders to display     Treatments provided: IVF (see MAR)   Family Comments: NA  OBS brochure/video discussed/provided to patient:  Yes  ED Medications:   Medications   0.9% sodium chloride BOLUS (0 mLs Intravenous Stopped 1/29/21 1531)     Drips infusing:  No  For the majority of the shift, the patient's behavior Green. Interventions performed were none.    Sepsis treatment initiated: No     Patient tested for COVID 19 prior to admission: Known COVID+ on 1/26.    ED Nurse Name/Phone Number: Ronel Rodriguez RN,   7:21 PM

## 2021-01-30 NOTE — ED NOTES
"Pt reports feeling increasingly more \"shaky\" and \"nervous that I'll have a seizure.\"    MD notified.  "

## 2021-01-30 NOTE — PROGRESS NOTES
[facility]  Transfer Triage Note    Date of call: 01/29/21  Time of call: 7:29 PM    Is pandemic COVID-19 a concern? YES:   1.  Travel history/exposure history (select all that apply): likely community spread   2.  Vitals: VSS  3.  On Oxygen? (select one option):  none  4.  History of lung disease or active smoking (or other risk factors for death/respiratory      failure?: No  5.  Type of bed requested (select one option): med/surg  6.  Other Isolation needed (select all that apply): Contact and Droplet  7.  Has the patient been added to the shared patient list 'COVID'?  Yes      Reason for transfer: further monitoring of ETOH withdrawal, social situation in covid patient   Diagnosis: ETOH intoxication, at risk for withdrawal, incidentally covid +    Outside Records: Available  Additional records requested to be faxed to [phone].    Stability of Patient: Patient is vitally stable, with no critical labs, and will likely remain stable throughout the transfer process  ICU: No    Expected Time of Arrival for Transfer: 0-8 hours    Arrival Location:  [facility] [address] [place] [address] Phone: [phone]    Recommendations for Management and Stabilization: Not needed    Additional Comments 35M w unstable social situation/homeless. Presented to [facility] intoxicated. 7th time in few days, ++ stressors. Interested in being detoxed. Incidentally found to have covid+ on 1/26, but asymptomatic.  [facility] provider Dr Goodwin was on the call. After discussion, accepted to [facility] med surg    Caridad Cooley MD

## 2021-02-06 ENCOUNTER — HOSPITAL ENCOUNTER (EMERGENCY)
Facility: CLINIC | Age: 36
Discharge: HOME | End: 2021-02-06
Attending: EMERGENCY MEDICINE | Admitting: EMERGENCY MEDICINE
Payer: MEDICAID

## 2021-02-06 VITALS
RESPIRATION RATE: 16 BRPM | HEIGHT: 67 IN | SYSTOLIC BLOOD PRESSURE: 109 MMHG | OXYGEN SATURATION: 98 % | TEMPERATURE: 98.4 F | WEIGHT: 180 LBS | HEART RATE: 104 BPM | BODY MASS INDEX: 28.25 KG/M2 | DIASTOLIC BLOOD PRESSURE: 73 MMHG

## 2021-02-06 DIAGNOSIS — F10.220 ACUTE ALCOHOLIC INTOXICATION IN ALCOHOLISM WITHOUT COMPLICATION (H): ICD-10-CM

## 2021-02-06 PROCEDURE — 99283 EMERGENCY DEPT VISIT LOW MDM: CPT | Performed by: EMERGENCY MEDICINE

## 2021-02-06 ASSESSMENT — MIFFLIN-ST. JEOR: SCORE: 1705.1

## 2021-02-06 NOTE — ED NOTES
Bed: ED12  Expected date:   Expected time:   Means of arrival:   Comments:  SPF 23  35/M, intoxicated  Outside most of the night, c/o arms and legs cold  No signs of frostbite  Yellow, 0548

## 2021-02-06 NOTE — DISCHARGE INSTRUCTIONS
Please avoid drinking to the point of severe intoxication.    Return to the emergency department for any worsening back pain, abdominal pain, fevers or chills, burning with urination, nausea or vomiting, weakness or any other concerns as given or discussed.    If you wish to receive detox, you can call to reserve a bed at [phone].  If you feel you are withdrawing from alcohol, come to the emergency department immediately.           Alcohol Abuse  Alcoholic drinks are harmful when you have too many of them. Drinking that disrupts your life is called alcohol abuse. Alcohol abuse can hurt your relationships with others. You may lose friends, a spouse, or even your job. You may be abusing alcohol if any of the following are true for you:  Duties at home or with  suffer because of drinking.  Duties at work or in school suffer because of drinking.  You have missed work or school because of drinking.  You use alcohol while driving or operating machinery.  You have legal problems such as arrests due to drinking.  You keep drinking even though it causes serious problems in your life.  Alcohol abuse can cause health problems. Too much alcohol can cause disease of the liver and pancreas. It can damage your brain and your heart. It raises your risk of cancer. It can lead to sexual problems and make it hard to conceive children. Alcohol abuse in pregnancy can hurt the baby. Alcohol affects how you think and respond. You are more likely to die in an accident or fire if you have been drinking.  Home Care  Admit you have a problem with alcohol.  Ask for help from your healthcare provider as well as trusted family members or close friends.  Get help from people trained in dealing with alcohol abuse. This may be individual counseling or group therapy, or it may be a supervised alcohol treatment program.  Join a self-help group for alcohol abuse such as Alcoholics Anonymous (AA).  Avoid people who abuse alcohol or  tempt you to drink.  Follow Up  as advised by the doctor or our staff. Contact these groups to get help:  Alcoholics Anonymous (AA): Go to www.aa.org or check the phone book for meetings near you.  National Alcohol and Substance Abuse Information Center (NASAIC): 844.161.8018 www.addictioncarecliniq.ly.[employer]  National [place] on Alcoholism and Drug Dependence (NCADD): 740-IEQ-MUPY (077-5597) www.ncadd.org  Al-Anon: 805-9SI-RLQA (262-3136) www.al-anon.org  Get Prompt Medical Attention  if you have:  Confusion  Hallucinations (seeing, hearing, or feeling things that aren t there)  Extreme drowsiness or inability to awaken  Increasing upper abdominal pain  Repeated vomiting, vomiting blood, or black or tarry stools  Severe shakiness or fast heart rate  Seizure (convulsion)    5782-9171 [employer]. [address] [place], [place] [address]. All rights reserved. This information is not intended as a substitute for professional medical care. Always follow your healthcare professional's instructions.

## 2021-02-06 NOTE — ED PROVIDER NOTES
"  History       Chief Complaint   Patient presents with     Alcohol Intoxication       HPI  Aly Solitario Jr. is a 35 year old male who is BIBA due to altered mental status and unable to take care of self.  He was found at a bus stop in the cold.  Temperature on scene was 94.7.  Blood alcohol content 0.26 on scene.    On arrival to the emergency department patient endorses alcohol use, denies other drugs but is unable to provide good history due to acute intoxication.  There was no evidence of trauma on scene, patient denies trauma or any pain.      I have reviewed the Medications, Allergies, Past Medical and Surgical History, and Social History in the Epic system.    Review of Systems  unable / unreliable     Physical Exam       /85   Pulse 88   Temp 94.5  F (34.7  C) (Oral)   Resp 16   Ht 1.702 m (5' 7\")   Wt 81.6 kg (180 lb)   SpO2 90%   BMI 28.19 kg/m      GEN: Somnolent, intoxicated, arousable to verbal stimulus.   HEENT: The head is normocephalic and atraumatic. Pupils are equal round and reactive to light. Extraocular motions are intact. There is no facial swelling.   CV: Regular rate   PULM: Unlabored breathing   EXT: Full range of motion.  No edema.  NEURO: Cranial nerves II through XII are intact and symmetric. Bilateral upper and lower extremities grossly show full range of motion without any focal deficits.       ED Course     ED Course            No results found for any visits on 02/06/21.             Assessments & Plan (with Medical Decision Making)   Aly Solitario Jr. is a 35 year old p/w AMS    EMR review demonstrate frequent visits for acute alcohol intoxication.    Clinically, the patient is intoxicated and unable to provide history  Exam is notable for no significant evidence of trauma  and decreased level of consciousness, presumably d/t intoxication, although a broad differential is considered.     DDX: other substance ingestion, meningitis, encephalitis, ICH, seizure and " post ictal state, infection, uremia, among others. Given known etoh ingestion, history, benign examination, I believe these are less likely.    Patient will be watched carefully with frequent neuro checks in the ED.  Anticipate pt will sober and improve. A change form a continuous improvement pattern will prompt re-evaluation and further workup.    Patient signed out to morning attending for continued reevaluation and reinterview once clinically sober.           I have reviewed the nursing notes.    I have reviewed the findings, diagnosis, plan and need for follow up with the patient.  Final diagnoses:   Acute alcoholic intoxication in alcoholism without complication (H)             February 6, 2021    [facility], [place], EMERGENCY DEPARTMENT       Cal Antoine MD  02/06/21 0638

## 2021-02-06 NOTE — ED TRIAGE NOTES
Pt presents to ED via EMS with c/o alcohol intoxication. EMS states that pt was sitting at bus stop for a few hours- temperature on scene was 94.7. DIXON 0.26 on scene. Pt denies complaints at this time, states that he wants detox.

## 2021-02-06 NOTE — ED NOTES
"     Emergency Department Patient Sign-out       Brief HPI:  This is a 35 year old male signed out to me by Dr. Anotine .  See initial ED Provider note for details of the presentation.            Significant Events prior to my assuming care: breathlyser 0.2, sleeping      Exam:   Patient Vitals for the past 24 hrs:   BP Temp Temp src Pulse Resp SpO2 Height Weight   02/06/21 1157 109/73 98.4  F (36.9  C) Oral 104 16 98 % -- --   02/06/21 1130 103/88 -- -- 100 -- -- -- --   02/06/21 1105 -- -- -- -- -- 97 % -- --   02/06/21 1100 119/82 -- -- 98 -- 98 % -- --   02/06/21 1055 -- -- -- -- -- 97 % -- --   02/06/21 1030 113/79 -- -- 97 -- 93 % -- --   02/06/21 1010 -- -- -- -- -- 95 % -- --   02/06/21 1005 -- -- -- -- -- 96 % -- --   02/06/21 1000 95/59 -- -- 90 -- 95 % -- --   02/06/21 0955 -- -- -- -- -- 96 % -- --   02/06/21 0930 103/64 -- -- 94 -- 95 % -- --   02/06/21 0900 110/76 -- -- 93 -- 99 % -- --   02/06/21 0830 103/87 -- -- 78 -- 99 % -- --   02/06/21 0815 93/84 -- -- 87 -- 95 % -- --   02/06/21 0800 -- -- -- 75 -- 94 % -- --   02/06/21 0722 -- 97.5  F (36.4  C) Oral -- -- -- -- --   02/06/21 0700 92/68 -- -- 79 -- -- -- --   02/06/21 0600 110/85 -- -- 88 -- 90 % -- --   02/06/21 0556 -- 94.5  F (34.7  C) Oral -- -- -- -- --   02/06/21 0552 120/82 -- -- 100 16 97 % 1.702 m (5' 7\") 81.6 kg (180 lb)           ED RESULTS:   No results found for this visit on 02/06/21 (from the past 24 hour(s)).    ED MEDICATIONS:   Medications - No data to display      Impression:    ICD-10-CM    1. Acute alcoholic intoxication in alcoholism without complication (H)  F10.220        Plan:    Pending studies include tolerated po and ambulate without problems, clinically sober, not interested in Detox, will discharge with follow up with his PMD as planned.        Taj Cleary MD, Taj Lim MD  02/06/21 1326    "

## 2021-02-17 ENCOUNTER — COMMUNICATION - HEALTHEAST (OUTPATIENT)
Dept: SCHEDULING | Facility: CLINIC | Age: 36
End: 2021-02-17

## 2021-02-17 ENCOUNTER — OFFICE VISIT (OUTPATIENT)
Dept: URGENT CARE | Facility: URGENT CARE | Age: 36
End: 2021-02-17
Payer: MEDICAID

## 2021-02-17 ENCOUNTER — NURSE TRIAGE (OUTPATIENT)
Dept: NURSING | Facility: CLINIC | Age: 36
End: 2021-02-17

## 2021-02-17 VITALS
TEMPERATURE: 98.6 F | SYSTOLIC BLOOD PRESSURE: 175 MMHG | DIASTOLIC BLOOD PRESSURE: 109 MMHG | OXYGEN SATURATION: 98 % | RESPIRATION RATE: 20 BRPM | HEART RATE: 82 BPM

## 2021-02-17 DIAGNOSIS — I10 ESSENTIAL HYPERTENSION: Primary | ICD-10-CM

## 2021-02-17 DIAGNOSIS — F41.9 ANXIETY: ICD-10-CM

## 2021-02-17 PROCEDURE — 99204 OFFICE O/P NEW MOD 45 MIN: CPT | Performed by: PHYSICIAN ASSISTANT

## 2021-02-17 RX ORDER — GABAPENTIN 100 MG/1
100 CAPSULE ORAL 3 TIMES DAILY
Qty: 90 CAPSULE | Refills: 0 | Status: SHIPPED | OUTPATIENT
Start: 2021-02-17 | End: 2021-06-20

## 2021-02-17 RX ORDER — TRIAMTERENE AND HYDROCHLOROTHIAZIDE 37.5; 25 MG/1; MG/1
1 CAPSULE ORAL EVERY MORNING
Qty: 30 CAPSULE | Refills: 0 | Status: SHIPPED | OUTPATIENT
Start: 2021-02-17 | End: 2021-06-20

## 2021-02-17 RX ORDER — HYDROXYZINE PAMOATE 25 MG/1
25 CAPSULE ORAL 3 TIMES DAILY PRN
Qty: 60 CAPSULE | Refills: 0 | Status: ON HOLD | OUTPATIENT
Start: 2021-02-17 | End: 2021-11-13

## 2021-02-17 NOTE — TELEPHONE ENCOUNTER
"Triage Call:    -Patient calling stating he just checked his blood pressure which was 164/110 and 152/110.   -Patient states that he was in the hospital on 2/6/21 and was discharged with Gabapentin, Hydroxyzine (anxiety) and Propanolol (blood pressure)(See [facility] chart)   -Patient went to [facility].   -Patient was given small supply of medication and was advised per the AVS to follow up in 3-5 days with a primary care provider.  -Patient did not see this on his AVS and so he has not yet established care with a provider.   -Patient has run out of his blood pressure medication about 1 week ago and the other two medications as well.  -Patient has been out of this medication for about 1 week.  -Patient states today he has been feeing \"off.\"  -Patient states earlier today he felt as if he had slight blurred vision, but that has subsided and currently not having any blurred vision now.   -Patient states that he has been having a headache all day that is mild to moderate.  -Patient denies any chest pain, SOB, blurred vision currently, weakness, unsteady gait/balance, dizziness, lightheaded, fevers, numbness, confusion,   -Per protocol, recommendations are for patient to see PCP within the next 24 hours. -Patient does not have a PCP and patient is having mild sx along with his high blood pressure.   -RN advised in general patient would need to be evaluated within 24 hours.   -RN advised patient can be seen tonight at [facility] or tomorrow at [facility] to address his blood pressure.   -RN advised patient to speak with [facility] provider to have them prescribe enough medication to help patient be able to establish care with a new provider.   -Patient is unsure if he wants to see a provider through "[employer]", [employer], [email or web address] etc...    -Patient states that he will go into [facility] tonight to have his blood pressure address and any other questions or concerns he has. RN advised patient if he has any other questions or concerns " to call back nurse line or any or new worsening sx. Patient verbalized understanding and agrees with plan.     Sugar Carlson RN, BSN Nurse Triage Advisor 5:32 PM 2/17/2021       Additional Information    Negative: Difficult to awaken or acting confused (e.g., disoriented, slurred speech)    Negative: Severe difficulty breathing (e.g., struggling for each breath, speaks in single words)    Negative: [1] Weakness of the face, arm or leg on one side of the body AND [2] new onset    Negative: [1] Numbness (i.e., loss of sensation) of the face, arm or leg on one side of the body AND [2] new onset    Negative: [1] Chest pain lasts > 5 minutes AND [2] history of heart disease  (i.e., heart attack, bypass surgery, angina, angioplasty, CHF)    Negative: [1] Chest pain AND [2] took nitrogylcerin AND [3] pain was not relieved    Negative: Sounds like a life-threatening emergency to the triager    Negative: [1] Systolic BP  >= 160 OR Diastolic >= 100 AND [2] cardiac or neurologic symptoms (e.g., chest pain, difficulty breathing, unsteady gait, blurred vision)    Negative: Symptom is main concern  (e.g., headache, chest pain)    Negative: Low blood pressure is main concern    Negative: [1] Pregnant > 20 weeks (or postpartum < 6 weeks) AND [2] new hand or face swelling    Negative: [1] Pregnant > 20 weeks AND [2] BP Systolic BP  >= 140 OR Diastolic >= 90    Negative: [1] Systolic BP  >= 200 OR Diastolic >= 120  AND [2] having NO cardiac or neurologic symptoms    Negative: [1] Postpartum < 6 weeks AND [2] BP Systolic BP  >= 140 OR Diastolic >= 90    Systolic BP  >= 180 OR Diastolic >= 110    [1] Systolic BP  >= 180 OR Diastolic >= 110 AND [2] missed most recent dose of blood pressure medication     Patient is out of his medication    Protocols used: HIGH BLOOD PRESSURE-A-AH    COVID 19 Nurse Triage Plan/Patient Instructions    Please be aware that novel coronavirus (COVID-19) may be circulating in the community. If you  develop symptoms such as fever, cough, or SOB or if you have concerns about the presence of another infection including coronavirus (COVID-19), please contact your health care provider or visit [email or web address].     Disposition/Instructions    In-Person Visit with provider recommended. Reference Visit Selection Guide.    Thank you for taking steps to prevent the spread of this virus.  o Limit your contact with others.  o Wear a simple mask to cover your cough.  o Wash your hands well and often.    Resources    [facility]: About COVID-19: [email or web address]    CDC: What to Do If You're Sick: www.cdc.gov/coronavirus/2019-ncov/about/steps-when-sick.html    CDC: Ending Home Isolation: www.cdc.gov/coronavirus/2019-ncov/hcp/disposition-in-home-patients.html     CDC: Caring for Someone: www.cdc.gov/coronavirus/2019-ncov/if-you-are-sick/care-for-someone.html     [facility]: Interim Guidance for Hospital Discharge to Home: www.[email or web address].[place]./diseases/coronavirus/hcp/hospdischarge.pdf    [facility] clinical trials (COVID-19 research studies): clinicalaffairs.[facility]-clinical-trials     Below are the COVID-19 hotlines at the [place] [facility] ([facility]). Interpreters are available.   o For health questions: Call 368-229-4712 or 1-189.224.4527 (7 a.m. to 7 p.m.)  o For questions about schools and childcare: Call 628-937-1760 or 1-607.774.3491 (7 a.m. to 7 p.m.)

## 2021-02-18 NOTE — PATIENT INSTRUCTIONS
Patient Education     Treating Anxiety Disorders with Medicine  An anxiety disorder can make you feel nervous or apprehensive, even without a clear reason. In people age 65 and older, generalized anxiety disorder is one of the most commonly diagnosed anxiety disorders. Many times it occurs with depression. Certain anxiety disorders can cause intense feelings of fear or panic. You may even have physical symptoms such as a racing heartbeat, sweating, or dizziness. If you have these feelings, you don t have to suffer anymore. Treatment to help you overcome your fears will likely include therapy (also called counseling). Medicine may also be prescribed to help control your symptoms.     Medicines  Certain medicines may be prescribed to help control your symptoms. So you may feel less anxious. You may also feel able to move forward with therapy. At first, medicines and dosages may need to be adjusted to find what works best for you. Try to be patient. Tell your healthcare provider how a medicine makes you feel. This way, you can work together to find the treatment that s best for you. Keep in mind that medicines can have side effects. Talk with your provider about any side effects that are bothering you. Changing the dose or type of medicine may help. Don t stop taking medicine on your own. That can cause symptoms to come back or cause dangerous withdrawal symptoms.     Anti-anxiety medicine. This medicine eases symptoms and helps you relax. Your healthcare provider will explain when and how to use it. It may be prescribed for use before situations that make you anxious. You may also be told to take medicine on a regular schedule. Anti-anxiety medicine may make you feel a little sleepy or  out of it.  Don t drive a car or operate machinery while on this medicine, until you know how it affects you.  Never use alcohol or other drugs with anti-anxiety medicines. This could result in loss of muscular control, sedation,  coma, or death. Also, use only the amount of medicine prescribed for you. If you think you may have taken too much, get emergency care right away. Never share your medicines with others. Store these medicines in a safe place that can't be reached by children or visitors.   Keep taking medicines as prescribed  Never change your dosage, share or use another person's medicine, or stop taking your medicines without talking to your healthcare provider first. Keep the following in mind:     Some medicines must be taken on a schedule. Make this part of your daily routine. For instance, always take your pill before brushing your teeth. A pillbox can help you remember if you ve taken your medicine each day.    Medicines are often taken for 6 to 12 months. Your healthcare provider will then evaluate whether you need to stay on them. Many people who have also had therapy may no longer need medicine to manage anxiety.    You may need to stop taking medicine slowly to give your body time to adjust. When it s time to stop, your healthcare provider will tell you more. Remember: Never stop taking your medicine without talking to your provider first.    If symptoms return, you may need to start taking medicines again.  This isn t your fault. It s just the nature of your anxiety disorder.  What to think about    Side effects. Medicines may cause side effects. Ask your healthcare provider or pharmacist what you can expect. They may have ideas for avoiding some side effects.    Sexual problems. Some antidepressants can affect your desire for sex or your ability to have an orgasm. A change in dosage or medicine often solves the problem. If you have a sexual side effect that concerns you, tell your healthcare provider.    Addiction. If you ve never had a problem with drugs or alcohol, you may not have a problem with medicines used to treat anxiety disorders. But always discuss the medicines with your healthcare provider before taking  them. If you have a history of addiction, you may not be able to use certain medicines used to treat anxiety disorders.    Medicine interactions. Always check with your pharmacist before using any over-the-counter medicines (OTCs), including herbal supplements. Some OTCs may interact with your anti-anxiety medicines and increase or decrease their effectiveness.    StayWell last reviewed this educational content on 12/1/2019 2000-2020 [email or web address]. [address] [place] [address], [place] [address]. All rights reserved. This information is not intended as a substitute for professional medical care. Always follow your healthcare professional's instructions.           Patient Education     What Is High Blood Pressure?  High blood pressure (hypertension) is known as the  silent killer.  This is because most of the time it doesn t cause symptoms. In fact, many people don t know they have it until other problems develop. In most cases, high blood pressure often requires lifelong treatment.  Understanding blood pressure  The circulatory system is made up of the heart and blood vessels that carry blood through the body. Your heart is the pump for this system. With each heartbeat (contraction), the heart sends blood out through large blood vessels called arteries. Blood pressure is a measure of how hard the moving blood pushes against the walls of the arteries.  High blood pressure can harm your health  In a healthy blood vessel, the blood moves smoothly through the vessel and puts normal pressure on the vessel walls.    High blood pressure occurs when blood pushes too hard against artery walls. This causes damage to the artery walls and then the formation of scar tissue as it heals. This makes the arteries stiff and weak. Plaque sticks to the scarred tissue narrowing and hardening the arteries. High blood pressure also causes your heart to work harder to get blood out to the body. High blood pressure raises your risk  of heart attack, also known as acute myocardial infarction, or AMI, heart failure, and stroke. It can also lead to kidney disease, and blindness. In general, if you have high blood pressure, keeping your blood pressure below 130/80 mmHg may help prevent these problems. Your healthcare provider may prescribe medicine to help control blood pressure if lifestyle changes are not enough.  It's important to know your blood pressure numbers. Blood pressure measurements are given as 2 numbers. Systolic blood pressure is the upper number. This is the pressure when the heart contracts. Diastolic blood pressure is the lower number. This is the pressure when the heart relaxes between beats.  Blood pressure is categorized as normal, elevated, or stage 1 or stage 2 high blood pressure:    Normal blood pressure is systolic of less than 120 and diastolic of less than 80 (120/80)    Elevated blood pressure is systolic of 120 to 129 and diastolic less than 80    Stage 1 high blood pressure is systolic is 130 to 139 or diastolic between 80 to 89    Stage 2 high blood pressure is when systolic is 140 or higher or the diastolic is 90 or higher  High blood pressure is diagnosed when multiple, separate readings show blood pressure above 130/80 mmHg. Talk with your healthcare provider if you have questions or concerns about your blood pressure readings.  Measuring blood pressure  An example of a blood pressure measurement is 120/70. The top number is the pressure of blood against the artery walls during a heartbeat (systolic). The bottom number is the pressure of blood against artery walls between heartbeats (diastolic). Talk with your healthcare provider to find out what your blood pressure goals should be.   Controlling blood pressure  If your blood pressure is too high, work with your doctor on a plan for lowering it. Below are steps you can take that will help lower your blood pressure.    Choose heart-healthy foods. Eating healthier  "meals helps you control your blood pressure. Ask your doctor about the DASH eating plan. This plan helps reduce blood pressure by limiting the amount of sodium (salt) you have in your diet. DASH also encourages eating plenty of fruits and vegetables, low-fat or non-fat dairy, whole-grains, and foods high in fiber, and low in fat. This also provides an enhanced amount of potassium which can also help lower blood pressure.    Reduce sodium. Reducing sodium in your diet reduces fluid retention. Fluid retention caused by too much salt increases blood volume and blood pressure. The American Heart Association (AHA) advises an \"ideal\" amount of sodium: no more than 1,500 mg a day.  But because Americans eat so much salt, the AHA says a positive change can occur by cutting back to even 2,300 mg a day.    Stay at a healthy weight. Being overweight makes you more likely to have high blood pressure. Losing excess weight helps lower blood pressure.    Exercise regularly. Daily exercise helps your heart and blood vessels work better and stay healthier. It can help lower your blood pressure.    Stop smoking. Smoking increases blood pressure and damages blood vessels.    Limit alcohol. Drinking too much alcohol can raise blood pressure. Men should have no more than 2 drinks a day. Women should have no more than 1. A drink is equal to 1 beer, or a small glass of wine, or a shot of liquor.    Control stress. Stress makes your heart work harder and beat faster. Controlling stress helps you control your blood pressure.  Facts about high blood pressure    Feeling OK does not mean your blood pressure is under control. Likewise, feeling bad doesn t mean it s out of control. The only way to know for sure is to check your pressure regularly.    Medicine is only one part of controlling high blood pressure. You also need to manage your weight, get regular exercise, and adjust your eating habits.    High blood pressure is usually a lifelong " problem. But it can be controlled with healthy lifestyle changes and medicine.    Hypertension is not the same as stress. Although stress may be a factor in high blood pressure, it s only one part of the story.    Blood pressure medicines need to be taken every day. Stopping suddenly may cause a dangerous increase in pressure.    StayWell last reviewed this educational content on 4/1/2019 2000-2020 [employer]. [address] [place] [address], [place] [address]. All rights reserved. This information is not intended as a substitute for professional medical care. Always follow your healthcare professional's instructions.

## 2021-02-18 NOTE — PROGRESS NOTES
Assessment & Plan     Essential hypertension  Last CMP from 1/30/2021 within normal limits.  Patient has noted elevated blood pressure today, systolic in the 150s and diastolic in the 110's. Here his blood pressure is elevated at 179/109.  He was discharged from [facility] a couple weeks ago on propranolol for tachycardia related to alcohol withdrawal.  He was given a few days supply and advised to follow-up with primary care.  He still has not yet established care with a primary care provider.  Patient is given a month worth of Dyazide for BP management today. I Re-emphasized the importance of following up with primary care provider for further BP recheck and medication titration as needed.  He is agreeable to this plan.  - triamterene-HCTZ (DYAZIDE) 37.5-25 MG capsule  Dispense: 30 capsule; Refill: 0      Anxiety  He was discharged from [facility] a couple weeks ago, on gabapentin and hydroxyzine as needed. He was only given a few days worth of prescription and was advised to follow-up/establish care with primary care for further refills.  He still has not yet established care with a primary care provider. He has run out these meds for the past 10 days or so.  Gabapentin and hydroxyzine is refilled today.  Advised to establish care and follow-up with PCP for further refills.  He agrees with the plan.  - gabapentin (NEURONTIN) 100 MG capsule  Dispense: 90 capsule; Refill: 0  - hydrOXYzine (VISTARIL) 25 MG capsule  Dispense: 60 capsule; Refill: 0         Return in 1 week (on 2/24/2021) for Please establish care with a provider in Family practice.    Brigitte Mendez PA-C  [facility] [place] is [place] 35 year old male who presents to clinic today for the following health issues:  Chief Complaint   Patient presents with     Urgent Care     Hypertension     Concerns of elevated BP-checked at home 152/110, 164/110-Possible anxiety as well-under alot of stress  (note: Pt had rx for Propranolol and Gabapentin but hasnt had for 1wk now?-     HPI  Patient with Hx of alcohol abuse, tobacco abuse, DT's related to alcohol withdrawal recently admitted to [facility] from 1/29/2021 through 1/31/2021. Of note, he tested positive for Covid 1/26/2021. He is currently living with his sister.  He is presenting to urgent care today with concerns for elevated blood pressure and anxiety.  Following his recent hospital stay he was discharged on hydroxyzine as needed, gabapentin tid for anxiety and propanolol for tachycardia.  Upon his discharge, he was given a week worth of prescription and advised to establish care and follow-up with a primary care provider.  He still has not yet established care with a provider.  He notes he has been feeling more anxious over the past few days. He denies any suicidal ideation. Denies any chest pain or shortness of breath.  He denies any nausea or vomiting, any dizziness, weakness, lightheadedness or confusion. He denies any visual disturbances. He took his blood pressure today and it was elevated at 164/110 and and 152/110.  He called the [facility] nurse advising line and was advised to be seen.      Review of Systems  Constitutional, HEENT, cardiovascular, pulmonary, gi and gu systems are negative, except as otherwise noted.      Objective    BP (!) 175/109   Pulse 82   Temp 98.6  F (37  C) (Oral)   Resp 20   SpO2 98%   Physical Exam   GENERAL: healthy, alert and no distress  RESP: lungs clear to auscultation - no rales, rhonchi or wheezes  CV: regular rate and rhythm, normal S1 S2, no S3 or S4, no murmur,   MS: no gross musculoskeletal defects noted, no edema  NEURO: mentation intact and speech normal

## 2021-06-15 NOTE — TELEPHONE ENCOUNTER
"Triage Call:    -Patient calling stating he just checked his blood pressure which was 164/110 and 152/110.   -Patient states that he was in the hospital on 2/6/21 and was discharged with Gabapentin, Hydroxyzine (anxiety) and Propanolol (blood pressure).  -Patient went to [facility].   -Patient was given small supply of medication and was advised per the AVS to follow up in 3-5 days with a primary care provider.  -Patient did not see this on his AVS and so he has not yet established care with a provider.   -Patient has run out of his blood pressure medication about 1 week ago and the other two medications as well.  -Patient has been out of this medication for about 1 week.  -Patient states today he has been feeing \"off.\"  -Patient states earlier today he felt as if he had slight blurred vision, but that has subsided and currently not having any blurred vision now.   -Patient states that he has been having a headache all day that is mild to moderate.  -Patient denies any chest pain, SOB, blurred vision currently, weakness, unsteady gait/balance, dizziness, lightheaded, fevers, numbness, confusion,   -Per protocol, recommendations are for patient to see PCP within the next 24 hours. -Patient does not have a PCP and patient is having mild sx along with his high blood pressure.   -RN advised in general patient would need to be evaluated within 24 hours.   -RN advised patient can be [facility] or tomorrow [facility] to address his blood pressure.   -RN advised patient to speak with [facility] provider to have them prescribe enough medication to help patient be able to establish care with a new provider.   -Patient is unsure if he wants to see a provider through [facility], [facility], [facility] etc...    -Patient states that he will go into [facility] tonight to have his blood pressure address and any other questions or concerns he has. RN advised patient if he has any other questions or concerns to call back nurse " line or any or new worsening sx. Patient verbalized understanding and agrees with plan.     Sugar Carlson RN, BSN Nurse Triage Advisor 5:32 PM 2/17/2021     Reason for Disposition    [1] Systolic BP  >= 180 OR Diastolic >= 110 AND [2] missed most recent dose of blood pressure medication     Patient has been out of his blood pressure medication    Systolic BP  >= 180 OR Diastolic >= 110    Additional Information    Negative: Difficult to awaken or acting confused (e.g., disoriented, slurred speech)    Negative: Severe difficulty breathing (e.g., struggling for each breath, speaks in single words)    Negative: [1] Weakness of the face, arm or leg on one side of the body AND [2] new onset    Negative: [1] Numbness (i.e., loss of sensation) of the face, arm or leg on one side of the body AND [2] new onset    Negative: [1] Chest pain lasts > 5 minutes AND [2] history of heart disease  (i.e., heart attack, bypass surgery, angina, angioplasty, CHF)    Negative: [1] Chest pain AND [2] took nitrogylcerin AND [3] pain was not relieved    Negative: Sounds like a life-threatening emergency to the triager    Negative: Symptom is main concern  (e.g., headache, chest pain)    Negative: Low blood pressure is main concern    Negative: [1] Systolic BP  >= 160 OR Diastolic >= 100 AND [2] cardiac or neurologic symptoms (e.g., chest pain, difficulty breathing, unsteady gait, blurred vision)    Negative: [1] Pregnant > 20 weeks (or postpartum < 6 weeks) AND [2] new hand or face swelling    Negative: [1] Pregnant > 20 weeks AND [2] BP Systolic BP  >= 140 OR Diastolic >= 90    Negative: [1] Systolic BP  >= 200 OR Diastolic >= 120  AND [2] having NO cardiac or neurologic symptoms    Negative: [1] Postpartum < 6 weeks AND [2] BP Systolic BP  >= 140 OR Diastolic >= 90    Protocols used: HIGH BLOOD PRESSURE-A-AH    COVID 19 Nurse Triage Plan/Patient Instructions    Please be aware that novel coronavirus (COVID-19) may be circulating in  the community. If you develop symptoms such as fever, cough, or SOB or if you have concerns about the presence of another infection including coronavirus (COVID-19), please contact your health care provider or visit [email or web address].     Disposition/Instructions    In-Person Visit with provider recommended. Reference Visit Selection Guide.    Thank you for taking steps to prevent the spread of this virus.  o Limit your contact with others.  o Wear a simple mask to cover your cough.  o Wash your hands well and often.    Resources    [facility]: About COVID-19: [email or web address]    CDC: What to Do If You're Sick: www.cdc.gov/coronavirus/2019-ncov/about/steps-when-sick.html    CDC: Ending Home Isolation: www.cdc.gov/coronavirus/2019-ncov/hcp/disposition-in-home-patients.html     CDC: Caring for Someone: www.cdc.gov/coronavirus/2019-ncov/if-you-are-sick/care-for-someone.html     [facility]: Interim Guidance for Hospital Discharge to Home: www.[email or web address]./diseases/coronavirus/hcp/hospdischarge.pdf    [facility] clinical trials (COVID-19 research studies): [email or web address]-clinical-trials     Below are the COVID-19 hotlines at the [place] [facility] ([facility]). Interpreters are available.   o For health questions: Call 250-264-2852 or 1-931.531.9453 (7 a.m. to 7 p.m.)  o For questions about schools and childcare: Call 146-860-2826 or 1-511.689.1493 (7 a.m. to 7 p.m.)

## 2021-06-20 ENCOUNTER — HOSPITAL ENCOUNTER (EMERGENCY)
Facility: CLINIC | Age: 36
Discharge: HOME | End: 2021-06-20
Attending: EMERGENCY MEDICINE | Admitting: EMERGENCY MEDICINE
Payer: COMMERCIAL

## 2021-06-20 ENCOUNTER — APPOINTMENT (OUTPATIENT)
Dept: GENERAL RADIOLOGY | Facility: CLINIC | Age: 36
End: 2021-06-20
Attending: EMERGENCY MEDICINE
Payer: COMMERCIAL

## 2021-06-20 VITALS
RESPIRATION RATE: 16 BRPM | DIASTOLIC BLOOD PRESSURE: 111 MMHG | OXYGEN SATURATION: 100 % | HEIGHT: 67 IN | SYSTOLIC BLOOD PRESSURE: 146 MMHG | BODY MASS INDEX: 28.25 KG/M2 | WEIGHT: 180 LBS | TEMPERATURE: 98.4 F | HEART RATE: 100 BPM

## 2021-06-20 DIAGNOSIS — F41.9 ANXIETY: ICD-10-CM

## 2021-06-20 DIAGNOSIS — I10 ESSENTIAL HYPERTENSION: ICD-10-CM

## 2021-06-20 DIAGNOSIS — F10.220 ACUTE ALCOHOLIC INTOXICATION IN ALCOHOLISM WITHOUT COMPLICATION (H): ICD-10-CM

## 2021-06-20 LAB
ALBUMIN SERPL-MCNC: 3.9 G/DL (ref 3.4–5)
ALP SERPL-CCNC: 119 U/L (ref 40–150)
ALT SERPL W P-5'-P-CCNC: 59 U/L (ref 0–70)
ANION GAP SERPL CALCULATED.3IONS-SCNC: 6 MMOL/L (ref 3–14)
AST SERPL W P-5'-P-CCNC: 77 U/L (ref 0–45)
BASOPHILS # BLD AUTO: 0.1 10E9/L (ref 0–0.2)
BASOPHILS NFR BLD AUTO: 1.4 %
BILIRUB SERPL-MCNC: 0.2 MG/DL (ref 0.2–1.3)
BUN SERPL-MCNC: 5 MG/DL (ref 7–30)
CALCIUM SERPL-MCNC: 7.9 MG/DL (ref 8.5–10.1)
CHLORIDE SERPL-SCNC: 112 MMOL/L (ref 94–109)
CO2 SERPL-SCNC: 25 MMOL/L (ref 20–32)
CREAT SERPL-MCNC: 0.74 MG/DL (ref 0.66–1.25)
D DIMER PPP FEU-MCNC: 0.3 UG/ML FEU (ref 0–0.5)
DIFFERENTIAL METHOD BLD: NORMAL
EOSINOPHIL # BLD AUTO: 0 10E9/L (ref 0–0.7)
EOSINOPHIL NFR BLD AUTO: 0.3 %
ERYTHROCYTE [DISTWIDTH] IN BLOOD BY AUTOMATED COUNT: 14 % (ref 10–15)
ETHANOL SERPL-MCNC: 0.31 G/DL
GFR SERPL CREATININE-BSD FRML MDRD: >90 ML/MIN/{1.73_M2}
GLUCOSE SERPL-MCNC: 98 MG/DL (ref 70–99)
HCT VFR BLD AUTO: 48 % (ref 40–53)
HGB BLD-MCNC: 15.9 G/DL (ref 13.3–17.7)
IMM GRANULOCYTES # BLD: 0 10E9/L (ref 0–0.4)
IMM GRANULOCYTES NFR BLD: 0.3 %
LYMPHOCYTES # BLD AUTO: 1.3 10E9/L (ref 0.8–5.3)
LYMPHOCYTES NFR BLD AUTO: 18.8 %
MCH RBC QN AUTO: 29.1 PG (ref 26.5–33)
MCHC RBC AUTO-ENTMCNC: 33.1 G/DL (ref 31.5–36.5)
MCV RBC AUTO: 88 FL (ref 78–100)
MONOCYTES # BLD AUTO: 0.4 10E9/L (ref 0–1.3)
MONOCYTES NFR BLD AUTO: 6.2 %
NEUTROPHILS # BLD AUTO: 4.9 10E9/L (ref 1.6–8.3)
NEUTROPHILS NFR BLD AUTO: 73 %
NRBC # BLD AUTO: 0 10*3/UL
NRBC BLD AUTO-RTO: 0 /100
PLATELET # BLD AUTO: 437 10E9/L (ref 150–450)
POTASSIUM SERPL-SCNC: 3.6 MMOL/L (ref 3.4–5.3)
PROT SERPL-MCNC: 8 G/DL (ref 6.8–8.8)
RBC # BLD AUTO: 5.47 10E12/L (ref 4.4–5.9)
SODIUM SERPL-SCNC: 143 MMOL/L (ref 133–144)
TROPONIN I SERPL-MCNC: <0.015 UG/L (ref 0–0.04)
WBC # BLD AUTO: 6.7 10E9/L (ref 4–11)

## 2021-06-20 PROCEDURE — 258N000003 HC RX IP 258 OP 636: Performed by: EMERGENCY MEDICINE

## 2021-06-20 PROCEDURE — 84484 ASSAY OF TROPONIN QUANT: CPT | Performed by: EMERGENCY MEDICINE

## 2021-06-20 PROCEDURE — 85379 FIBRIN DEGRADATION QUANT: CPT | Performed by: EMERGENCY MEDICINE

## 2021-06-20 PROCEDURE — 96360 HYDRATION IV INFUSION INIT: CPT

## 2021-06-20 PROCEDURE — 71046 X-RAY EXAM CHEST 2 VIEWS: CPT

## 2021-06-20 PROCEDURE — 85025 COMPLETE CBC W/AUTO DIFF WBC: CPT | Performed by: EMERGENCY MEDICINE

## 2021-06-20 PROCEDURE — 99285 EMERGENCY DEPT VISIT HI MDM: CPT | Mod: 25

## 2021-06-20 PROCEDURE — 250N000009 HC RX 250: Performed by: EMERGENCY MEDICINE

## 2021-06-20 PROCEDURE — 82077 ASSAY SPEC XCP UR&BREATH IA: CPT | Performed by: EMERGENCY MEDICINE

## 2021-06-20 PROCEDURE — 96361 HYDRATE IV INFUSION ADD-ON: CPT

## 2021-06-20 PROCEDURE — 250N000013 HC RX MED GY IP 250 OP 250 PS 637: Performed by: EMERGENCY MEDICINE

## 2021-06-20 PROCEDURE — 80053 COMPREHEN METABOLIC PANEL: CPT | Performed by: EMERGENCY MEDICINE

## 2021-06-20 RX ORDER — TRIAMTERENE AND HYDROCHLOROTHIAZIDE 37.5; 25 MG/1; MG/1
1 CAPSULE ORAL EVERY MORNING
Qty: 3 CAPSULE | Refills: 0 | Status: ON HOLD | OUTPATIENT
Start: 2021-06-20 | End: 2021-11-13

## 2021-06-20 RX ORDER — GABAPENTIN 100 MG/1
100 CAPSULE ORAL 3 TIMES DAILY
Qty: 9 CAPSULE | Refills: 0 | Status: ON HOLD | OUTPATIENT
Start: 2021-06-20 | End: 2021-11-13

## 2021-06-20 RX ORDER — LORAZEPAM 1 MG/1
1 TABLET ORAL ONCE
Status: COMPLETED | OUTPATIENT
Start: 2021-06-20 | End: 2021-06-20

## 2021-06-20 RX ORDER — FOLIC ACID 1 MG/1
1 TABLET ORAL ONCE
Status: COMPLETED | OUTPATIENT
Start: 2021-06-20 | End: 2021-06-20

## 2021-06-20 RX ORDER — LANOLIN ALCOHOL/MO/W.PET/CERES
100 CREAM (GRAM) TOPICAL ONCE
Status: COMPLETED | OUTPATIENT
Start: 2021-06-20 | End: 2021-06-20

## 2021-06-20 RX ADMIN — LORAZEPAM 1 MG: 1 TABLET ORAL at 15:38

## 2021-06-20 RX ADMIN — THIAMINE HCL TAB 100 MG 100 MG: 100 TAB at 14:44

## 2021-06-20 RX ADMIN — SODIUM CHLORIDE, POTASSIUM CHLORIDE, SODIUM LACTATE AND CALCIUM CHLORIDE 1000 ML: 600; 310; 30; 20 INJECTION, SOLUTION INTRAVENOUS at 14:44

## 2021-06-20 RX ADMIN — LIDOCAINE HYDROCHLORIDE 30 ML: 20 SOLUTION ORAL; TOPICAL at 17:08

## 2021-06-20 RX ADMIN — FOLIC ACID 1 MG: 1 TABLET ORAL at 14:44

## 2021-06-20 ASSESSMENT — MIFFLIN-ST. JEOR: SCORE: 1700.1

## 2021-06-20 NOTE — ED NOTES
Update to detox on pt choosing to go home. Pt en route to home w/ sister. Sent w/ 3-day supply of Rxs.

## 2021-06-20 NOTE — ED NOTES
Pt ambulatory to [facility] w/ steady gait. Some tremors noted. Pt reports feeling very anxious. Provided w/ box lunch and water.

## 2021-06-20 NOTE — ED NOTES
Pt now requesting to go home instead of detox. Does have a sister who is sober and will pick him up. MD updated.

## 2021-06-20 NOTE — ED TRIAGE NOTES
Patient here by EMS with complaint of left sided chest pain. He also reported he has had 18 beers today, and 1/2 bottle of Listerine. Reporting depression and increased drinking since his brother  by suicide 1.5 years ago. Ambulatory from cot to bed in room. He is concerned about withdrawal seizures. EKG per EMS normal, heart rate 110's. /100, Blood glucose 136. He has been living in a hotel, not taking prescribed medications including BP med.

## 2021-06-20 NOTE — ED PROVIDER NOTES
"  History   Chief Complaint:  Chest Pain, Alcohol Problem, and Depression       HPI   Aly Solitario Jr. is a 36 year old male with history of alcohol abuse and alcohol withdrawal syndrome with associated seizures who presents via EMS with complaints of left-sided pleuritic chest pain.  He states he has drank numerous beers today as well as half a bottle of Listerine as he was worried about going into alcohol withdrawal.  He has been drinking heavily for the last several days.  About 4 hours ago which was when his last drink was he developed left-sided sharp chest pain.  He denies drinking any methanol or ethylene glycol.  No vomiting, black or bloody stools, fever.  No active suicidal or homicidal ideation.  He does note that he feels sad and depressed about his brother who  of suicide a year and a half ago.  Currently living in a hotel with 2 other individuals.    Review of Systems   ROS: 10 point ROS neg other than the symptoms noted above in the HPI.      Medications:  Librium  Neurontin  Vistaril  Dyazide    Past Medical History:    Alcohol abuse  COVID-19  Depression  Alcohol withdrawal syndrome with seizures  Hypertension    Past Surgical History:    Repaired artery in humerus     Social History:  Presents to ED via EMS    Physical Exam     Patient Vitals for the past 24 hrs:   BP Temp Temp src Pulse Resp SpO2 Height Weight   21 1700 -- -- -- -- -- 96 % -- --   21 1645 (!) 135/93 -- -- 100 -- 96 % -- --   21 1630 -- -- -- -- -- 98 % -- --   21 1530 (!) 144/105 -- -- 92 11 97 % -- --   21 1500 (!) 142/108 -- -- 92 11 98 % -- --   21 1445 (!) 134/98 -- -- 90 -- 98 % -- --   21 1430 (!) 134/98 -- -- 91 16 98 % -- --   21 1415 (!) 143/107 -- -- 98 16 99 % -- --   21 1400 (!) 145/113 -- -- 108 14 97 % -- --   21 1345 (!) 143/117 -- -- 105 16 98 % -- --   21 1328 (!) 153/114 98.4  F (36.9  C) Oral 105 16 96 % 1.702 m (5' 7\") 81.6 kg (180 lb) "       Physical Exam  Intoxicated but alert and able to participate in conversation, in no acute distress  HENT: Atraumatic, normocephalic mmm, no rhinorrhea  Eyes: 4 mm bilaterally, periorbital tissues and sclera normal   Neck: supple, no abnormal swelling  Lungs:  CTAB,  no resp distress, no reproducible chest wall tenderness palpation  CV: rrr, no m/r/g, ppi  Abd: soft, nontender, nondistended, no rebound/masses/guarding/hsm  Ext: no peripheral edema  Skin: warm, dry, well perfused, no rashes/bruising/lesions on exposed skin  Neuro: alert, MAEE, no gross motor or sensory deficits, gait stable  Psych: Normal mood, normal affect      Emergency Department Course   Imaging:  XR Chest 2 Views  No radiographic evidence of acute chest abnormality.   Reading per radiology     Laboratory:  CBC: WBC 6.7, HGB 15.9,   CMP: Chloride 112 (H), BUN 5 (L), Calcium 7.9 (L), AST 77 (H) o/w WNL (Creatinine 0.74)    Troponin (Collected 1410): <0.015  Alcohol level blood: 0.31 (H)(!)   D dimer: 0.3    Procedures  None    Emergency Department Course:  Reviewed:  1358 I reviewed the patient's nursing notes, vitals, past medical records, Care Everywhere.     Assessments:  1400 I performed an exam of the patient as documented above.     Interventions:  1444 Lactated ringers bolus 1,000 mL IV  1444 Thiamine 100 mg PO  1444 Folic acid 1 mg PO  1538 Ativan 1 mg PO  1708 GI Cocktail - Maalox 15 mL, Viscous Lidocaine 15 mL, 30 mL suspension PO     Disposition:  The patient was discharged to home.     Impression & Plan   Medical Decision Makin-year-old male here with chest pain in the setting of alcohol abuse.  Abdomen is benign.  Cardiopulmonary examination is normal.  EKG without evidence of acute ischemia or malignant arrhythmia.  Troponin is undetectable.  D-dimer is negative making a PE quite unlikely and chest radiograph shows no abnormalities.  Low suspicion for acute cardiopulmonary emergency at this point.  No other work-up  is indicated.  He initially mentioned wanting to go to detox and so arrangements were made for this to happen he then later changed his mind given that he is not actively suicidal homicidal is able to make his own decisions is a leave he has the capacity to do that he was discharged home with a sober ride.    Diagnosis:    ICD-10-CM    1. Acute alcoholic intoxication in alcoholism without complication (H)  F10.220    2. Anxiety  F41.9 gabapentin (NEURONTIN) 100 MG capsule   3. Essential hypertension  I10 triamterene-HCTZ (DYAZIDE) 37.5-25 MG capsule       Scribe Disclosure:  I, Pantera Elam, am serving as a scribe at 2:21 PM on 6/20/2021 to document services personally performed by Chema Strong MD based on my observations and the provider's statements to me.      Chema Strong MD  06/21/21 0005

## 2021-06-21 LAB — INTERPRETATION ECG - MUSE: NORMAL

## 2021-07-15 ENCOUNTER — HOSPITAL ENCOUNTER (EMERGENCY)
Facility: CLINIC | Age: 36
Discharge: PSYCHIATRIC OR SUBSTANCE USE FACILITY | End: 2021-07-15
Admitting: PHYSICIAN ASSISTANT
Payer: COMMERCIAL

## 2021-07-15 VITALS
OXYGEN SATURATION: 95 % | HEART RATE: 119 BPM | DIASTOLIC BLOOD PRESSURE: 83 MMHG | SYSTOLIC BLOOD PRESSURE: 133 MMHG | WEIGHT: 180 LBS | HEIGHT: 67 IN | RESPIRATION RATE: 16 BRPM | TEMPERATURE: 98.3 F | BODY MASS INDEX: 28.25 KG/M2

## 2021-07-15 DIAGNOSIS — F41.9 ANXIETY: ICD-10-CM

## 2021-07-15 DIAGNOSIS — F10.920 ALCOHOL INTOXICATION, UNCOMPLICATED (H): ICD-10-CM

## 2021-07-15 LAB
ALBUMIN SERPL-MCNC: 4 G/DL (ref 3.5–5)
ALP SERPL-CCNC: 112 U/L (ref 45–120)
ALT SERPL W P-5'-P-CCNC: 85 U/L (ref 0–45)
ANION GAP SERPL CALCULATED.3IONS-SCNC: 9 MMOL/L (ref 5–18)
AST SERPL W P-5'-P-CCNC: 77 U/L (ref 0–40)
BASOPHILS # BLD AUTO: 0.1 10E3/UL (ref 0–0.2)
BASOPHILS NFR BLD AUTO: 1 %
BILIRUB SERPL-MCNC: 0.4 MG/DL (ref 0–1)
BUN SERPL-MCNC: 14 MG/DL (ref 8–22)
CALCIUM SERPL-MCNC: 8 MG/DL (ref 8.5–10.5)
CHLORIDE BLD-SCNC: 106 MMOL/L (ref 98–107)
CO2 SERPL-SCNC: 25 MMOL/L (ref 22–31)
CREAT SERPL-MCNC: 0.76 MG/DL (ref 0.7–1.3)
EOSINOPHIL # BLD AUTO: 0 10E3/UL (ref 0–0.7)
EOSINOPHIL NFR BLD AUTO: 0 %
ERYTHROCYTE [DISTWIDTH] IN BLOOD BY AUTOMATED COUNT: 13.2 % (ref 10–15)
ETHANOL SERPL-MCNC: 277 MG/DL
GFR SERPL CREATININE-BSD FRML MDRD: >90 ML/MIN/1.73M2
GLUCOSE BLD-MCNC: 106 MG/DL (ref 70–125)
HCT VFR BLD AUTO: 45.6 % (ref 40–53)
HGB BLD-MCNC: 15.6 G/DL (ref 13.3–17.7)
IMM GRANULOCYTES # BLD: 0.1 10E3/UL
IMM GRANULOCYTES NFR BLD: 1 %
LIPASE SERPL-CCNC: 70 U/L (ref 0–52)
LYMPHOCYTES # BLD AUTO: 1.7 10E3/UL (ref 0.8–5.3)
LYMPHOCYTES NFR BLD AUTO: 16 %
MCH RBC QN AUTO: 28.7 PG (ref 26.5–33)
MCHC RBC AUTO-ENTMCNC: 34.2 G/DL (ref 31.5–36.5)
MCV RBC AUTO: 84 FL (ref 78–100)
MONOCYTES # BLD AUTO: 0.4 10E3/UL (ref 0–1.3)
MONOCYTES NFR BLD AUTO: 3 %
NEUTROPHILS # BLD AUTO: 8.4 10E3/UL (ref 1.6–8.3)
NEUTROPHILS NFR BLD AUTO: 79 %
NRBC # BLD AUTO: 0 10E3/UL
NRBC BLD AUTO-RTO: 0 /100
PLATELET # BLD AUTO: 344 10E3/UL (ref 150–450)
POTASSIUM BLD-SCNC: 3.8 MMOL/L (ref 3.5–5)
PROT SERPL-MCNC: 8 G/DL (ref 6–8)
RBC # BLD AUTO: 5.43 10E6/UL (ref 4.4–5.9)
SODIUM SERPL-SCNC: 140 MMOL/L (ref 136–145)
WBC # BLD AUTO: 10.6 10E3/UL (ref 4–11)

## 2021-07-15 PROCEDURE — 85025 COMPLETE CBC W/AUTO DIFF WBC: CPT | Performed by: PHYSICIAN ASSISTANT

## 2021-07-15 PROCEDURE — 250N000011 HC RX IP 250 OP 636: Performed by: PHYSICIAN ASSISTANT

## 2021-07-15 PROCEDURE — 250N000013 HC RX MED GY IP 250 OP 250 PS 637: Performed by: PHYSICIAN ASSISTANT

## 2021-07-15 PROCEDURE — 96375 TX/PRO/DX INJ NEW DRUG ADDON: CPT

## 2021-07-15 PROCEDURE — 82077 ASSAY SPEC XCP UR&BREATH IA: CPT | Performed by: PHYSICIAN ASSISTANT

## 2021-07-15 PROCEDURE — 83690 ASSAY OF LIPASE: CPT | Performed by: PHYSICIAN ASSISTANT

## 2021-07-15 PROCEDURE — 96361 HYDRATE IV INFUSION ADD-ON: CPT

## 2021-07-15 PROCEDURE — 99284 EMERGENCY DEPT VISIT MOD MDM: CPT | Mod: 25

## 2021-07-15 PROCEDURE — 82040 ASSAY OF SERUM ALBUMIN: CPT | Performed by: PHYSICIAN ASSISTANT

## 2021-07-15 PROCEDURE — 36415 COLL VENOUS BLD VENIPUNCTURE: CPT | Performed by: PHYSICIAN ASSISTANT

## 2021-07-15 PROCEDURE — 96374 THER/PROPH/DIAG INJ IV PUSH: CPT

## 2021-07-15 PROCEDURE — 258N000003 HC RX IP 258 OP 636: Performed by: PHYSICIAN ASSISTANT

## 2021-07-15 RX ORDER — LORAZEPAM 0.5 MG/1
TABLET ORAL DAILY
Status: ON HOLD | COMMUNITY
End: 2021-11-13

## 2021-07-15 RX ORDER — LORAZEPAM 1 MG/1
1 TABLET ORAL ONCE
Status: COMPLETED | OUTPATIENT
Start: 2021-07-15 | End: 2021-07-15

## 2021-07-15 RX ORDER — KETOROLAC TROMETHAMINE 15 MG/ML
15 INJECTION, SOLUTION INTRAMUSCULAR; INTRAVENOUS ONCE
Status: COMPLETED | OUTPATIENT
Start: 2021-07-15 | End: 2021-07-15

## 2021-07-15 RX ORDER — HYDROXYZINE HYDROCHLORIDE 25 MG/1
50 TABLET, FILM COATED ORAL ONCE
Status: COMPLETED | OUTPATIENT
Start: 2021-07-15 | End: 2021-07-15

## 2021-07-15 RX ORDER — ONDANSETRON 2 MG/ML
4 INJECTION INTRAMUSCULAR; INTRAVENOUS ONCE
Status: COMPLETED | OUTPATIENT
Start: 2021-07-15 | End: 2021-07-15

## 2021-07-15 RX ORDER — HYDROXYZINE HYDROCHLORIDE 25 MG/1
25 TABLET, FILM COATED ORAL ONCE
Status: COMPLETED | OUTPATIENT
Start: 2021-07-15 | End: 2021-07-15

## 2021-07-15 RX ADMIN — HYDROXYZINE HYDROCHLORIDE 50 MG: 25 TABLET, FILM COATED ORAL at 09:14

## 2021-07-15 RX ADMIN — LORAZEPAM 1 MG: 1 TABLET ORAL at 12:34

## 2021-07-15 RX ADMIN — KETOROLAC TROMETHAMINE 15 MG: 15 INJECTION, SOLUTION INTRAMUSCULAR; INTRAVENOUS at 10:47

## 2021-07-15 RX ADMIN — ONDANSETRON 4 MG: 2 INJECTION INTRAMUSCULAR; INTRAVENOUS at 09:14

## 2021-07-15 RX ADMIN — HYDROXYZINE HYDROCHLORIDE 25 MG: 25 TABLET, FILM COATED ORAL at 10:47

## 2021-07-15 RX ADMIN — SODIUM CHLORIDE 1000 ML: 9 INJECTION, SOLUTION INTRAVENOUS at 09:16

## 2021-07-15 ASSESSMENT — ENCOUNTER SYMPTOMS
ABDOMINAL PAIN: 0
CHILLS: 0
EYE DISCHARGE: 0
WOUND: 0
CHEST TIGHTNESS: 0
NECK PAIN: 0
BACK PAIN: 0
NAUSEA: 1
SORE THROAT: 0
NUMBNESS: 0
HEMATURIA: 0
COUGH: 0
TROUBLE SWALLOWING: 0
DIARRHEA: 0
VOMITING: 0
HEADACHES: 0
FEVER: 0
NERVOUS/ANXIOUS: 1
FREQUENCY: 0
SHORTNESS OF BREATH: 0
DYSURIA: 0
WEAKNESS: 0

## 2021-07-15 ASSESSMENT — MIFFLIN-ST. JEOR: SCORE: 1700.1

## 2021-07-15 NOTE — ED NOTES
Bed: [facility]09  Expected date: 7/15/21  Expected time: 8:42 AM  Means of arrival: Ambulance  Comments:  36 m ETOH

## 2021-07-15 NOTE — ED TRIAGE NOTES
Patient presents to ED after drinking about 1 liter of vodka. Last drink 2 hours ago. Was sober for 3 years, but brother  last week. Patient reports feeling anxious. Hx. Of withdrawal seizure.

## 2021-07-15 NOTE — CONSULTS
Care Management Discharge Note    Discharge Date:    Expected Time of Departure:  (1130-12)    Discharge Disposition: Other (Comments) (Detox)    Discharge Services: [facility]    Discharge DME: None    Discharge Transportation: other (see comments) ([place])    Private pay costs discussed: Not applicable    PAS Confirmation Code:    Patient/family educated on Medicare website which has current facility and service quality ratings:      Education Provided on the Discharge Plan: Pt to discharge to Detox    Persons Notified of Discharge Plans: [facility]  Patient/Family in Agreement with the Plan:  Yes    Handoff Referral Completed: Yes    Additional Information:  Pt to discharge to [facility] per the request of the patient. [place] to transport, ETA 1130-12pm today. Pt updated on plan.   Shireen Barrientos RNCM        Shireen Barrientos RN

## 2021-07-15 NOTE — ED PROVIDER NOTES
EMERGENCY DEPARTMENT ENCOUNTER      NAME: Aly Solitario Jr.  AGE: 36 year old male  YOB: 1985  MRN: [id number]  EVALUATION DATE & TIME: 7/15/2021  8:45 AM    PCP: No Ref-Primary, Physician    ED PROVIDER: Mando Gutierres PA-C      Chief Complaint   Patient presents with     Alcohol Intoxication         FINAL IMPRESSION:  1. Alcohol intoxication, uncomplicated (H)    2. Anxiety          MEDICAL DECISION MAKING:    Pertinent Labs & Imaging studies reviewed. (See chart for details)  36 year old male presents to the Emergency Department for evaluation of acute alcohol intoxication with history of anxiety looking for help with managing mainly his anxiety.      After obtaining history of present illness, reviewing vital signs and examining the patient plan to medically manage with fluids, nausea medication and screening labs as well as have social work assess and consult on the case.  Patient is willing to go to detox for alcohol treatment.  No suicidal homicidal ideation.    Screening labs are unremarkable.  Vital signs remained stable.  Patient continues to request something for anxiety however he does not appear in any acute distress there is no active tremors.  I will give a little bit more hydroxyzine as well as some Toradol.  Overall no indication for admission or further emergent work-up.  Patient comfortable with plan of care.    ED COURSE    8:58 AM I met with the patient, obtained history, performed an initial exam, and discussed options and plan for diagnostics and treatment here in the ED.  9:34 AM Spoke with Adela  10:19 AM I checked in on and updated the patient     At the conclusion of the encounter I discussed the results of all of the tests and the disposition. The questions were answered. The patient or family acknowledged understanding and was agreeable with the care plan.     MEDICATIONS GIVEN IN THE EMERGENCY:  Medications   ketorolac (TORADOL) injection 15 mg (has no  administration in time range)   hydrOXYzine (ATARAX) tablet 25 mg (has no administration in time range)   ondansetron (ZOFRAN) injection 4 mg (4 mg Intravenous Given 7/15/21 0914)   hydrOXYzine (ATARAX) tablet 50 mg (50 mg Oral Given 7/15/21 0914)   0.9% sodium chloride BOLUS (1,000 mLs Intravenous New Bag 7/15/21 0916)       NEW PRESCRIPTIONS STARTED AT TODAY'S ER VISIT  New Prescriptions    No medications on file            =================================================================    HPI    Patient information was obtained from: Patient    Use of Interpretor: N/A         Aly Solitario Jr. is a 36 year old male with a pertinent history of substance abuse and alcohol abuse, who presents to this ED via EMS for evaluation of alcohol intoxication.    Per chart review patient was admitted on 21 for acute alcoholic intoxication in alcoholism without complication. Patient initially mentioned wanting to go to detox and so arrangements were made for this to happen when he then later changed his mind. He was discharged home with a sober ride.    Patient reports drinking for the past 2 days. He reports he has been sober for 3 years, but first relapsed a couple weeks ago around 20 after his brother . Patient reports his anxiety is bad at the moment which is connected to his drinking. He reports he has been staying with his sister, but has been at his friend's house for the past couple nights where he can drink. He reports his last time drinking was a couple hours ago. He endorses nausea, but denies vomiting.    Patient reports taking ativan, librium, and hydrochlorothiazide, but has run out of medications for the past week. He reports he was prescribed his medications from a mental health worker who he is planning to see soon. He is agreeable to detox resources and plans. He has no other complaints at this time.      REVIEW OF SYSTEMS   Review of Systems   Constitutional: Negative for chills and fever.    HENT: Negative for congestion, sore throat and trouble swallowing.    Eyes: Negative for discharge and visual disturbance.   Respiratory: Negative for cough, chest tightness and shortness of breath.    Cardiovascular: Negative for chest pain and leg swelling.   Gastrointestinal: Positive for nausea. Negative for abdominal pain, diarrhea and vomiting.   Genitourinary: Negative for dysuria, frequency, hematuria and urgency.   Musculoskeletal: Negative for back pain, gait problem and neck pain.   Skin: Negative for rash and wound.   Neurological: Negative for weakness, numbness and headaches.   Psychiatric/Behavioral: Negative for behavioral problems and suicidal ideas. The patient is nervous/anxious.    All other systems reviewed and are negative.      PAST MEDICAL HISTORY:  Past Medical History:   Diagnosis Date     Alcohol abuse      Pancreatitis      Substance abuse (H)        PAST SURGICAL HISTORY:  Past Surgical History:   Procedure Laterality Date     SOFT TISSUE SURGERY      arm surgery with graft due to artery issue         CURRENT MEDICATIONS:      Current Facility-Administered Medications:      hydrOXYzine (ATARAX) tablet 25 mg, 25 mg, Oral, Once, Mando Gutierres PA-C     ketorolac (TORADOL) injection 15 mg, 15 mg, Intravenous, Once, Mando Gutierres PA-C    Current Outpatient Medications:      chlordiazePOXIDE (LIBRIUM) 25 MG capsule, Take 1-2 capsules (25-50 mg) by mouth 3 times daily as needed for anxiety or withdrawal, Disp: 12 capsule, Rfl: 0     hydrOXYzine (VISTARIL) 25 MG capsule, Take 1 capsule (25 mg) by mouth 3 times daily as needed for anxiety, Disp: 60 capsule, Rfl: 0     LORazepam (ATIVAN) 0.5 MG tablet, Take by mouth daily Unsure of dose., Disp: , Rfl:      gabapentin (NEURONTIN) 100 MG capsule, Take 1 capsule (100 mg) by mouth 3 times daily for 3 days, Disp: 9 capsule, Rfl: 0     triamterene-HCTZ (DYAZIDE) 37.5-25 MG capsule, Take 1 capsule by mouth every morning for 3 days, Disp: 3  "capsule, Rfl: 0      ALLERGIES:  No Known Allergies    FAMILY HISTORY:  History reviewed. No pertinent family history.    SOCIAL HISTORY:   Social History     Socioeconomic History     Marital status: Single     Spouse name: None     Number of children: None     Years of education: None     Highest education level: None   Occupational History     None   Tobacco Use     Smoking status: Current Every Day Smoker     Packs/day: 0.50     Smokeless tobacco: Never Used   Substance and Sexual Activity     Alcohol use: Yes     Comment: Whiskey 1L/day, ETOH abuse     Drug use: Yes     Types: Marijuana     Sexual activity: None   Other Topics Concern     None   Social History Narrative     None     Social Determinants of Health     Financial Resource Strain:      Difficulty of Paying Living Expenses:    Food Insecurity:      Worried About Running Out of Food in the Last Year:      Ran Out of Food in the Last Year:    Transportation Needs:      Lack of Transportation (Medical):      Lack of Transportation (Non-Medical):    Physical Activity:      Days of Exercise per Week:      Minutes of Exercise per Session:    Stress:      Feeling of Stress :    Social Connections:      Frequency of Communication with Friends and Family:      Frequency of Social Gatherings with Friends and Family:      Attends Sikh Services:      Active Member of Clubs or Organizations:      Attends Club or Organization Meetings:      Marital Status:    Intimate Partner Violence:      Fear of Current or Ex-Partner:      Emotionally Abused:      Physically Abused:      Sexually Abused:        VITALS:  Patient Vitals for the past 24 hrs:   BP Temp Temp src Pulse Resp SpO2 Height Weight   07/15/21 0945 -- -- -- 100 -- 95 % -- --   07/15/21 0930 (!) 140/106 -- -- 100 -- 95 % -- --   07/15/21 0846 (!) 148/99 98.3  F (36.8  C) Oral 111 16 95 % 1.702 m (5' 7\") 81.6 kg (180 lb)       PHYSICAL EXAM    Physical Exam  Vitals and nursing note reviewed. "   Constitutional:       General: He is not in acute distress.     Appearance: Normal appearance. He is not ill-appearing or toxic-appearing.   HENT:      Head: Normocephalic and atraumatic.      Right Ear: External ear normal.      Left Ear: External ear normal.      Nose: Nose normal.      Mouth/Throat:      Mouth: Mucous membranes are moist.      Pharynx: Oropharynx is clear. No oropharyngeal exudate or posterior oropharyngeal erythema.   Eyes:      General: No scleral icterus.        Right eye: No discharge.         Left eye: No discharge.      Extraocular Movements: Extraocular movements intact.      Conjunctiva/sclera: Conjunctivae normal.   Cardiovascular:      Rate and Rhythm: Regular rhythm. Tachycardia present.      Pulses: Normal pulses.      Heart sounds: Normal heart sounds. No murmur heard.     Pulmonary:      Effort: Pulmonary effort is normal. No respiratory distress.      Breath sounds: Normal breath sounds. No stridor. No wheezing, rhonchi or rales.   Chest:      Chest wall: No tenderness.   Abdominal:      General: Abdomen is flat. Bowel sounds are normal. There is no distension.      Palpations: Abdomen is soft.      Tenderness: There is no abdominal tenderness. There is no guarding or rebound.   Musculoskeletal:         General: No swelling, tenderness, deformity or signs of injury. Normal range of motion.      Cervical back: Normal range of motion and neck supple. No rigidity or tenderness.   Lymphadenopathy:      Cervical: No cervical adenopathy.   Skin:     General: Skin is warm and dry.      Coloration: Skin is not jaundiced.      Findings: No bruising, erythema or rash.   Neurological:      General: No focal deficit present.      Mental Status: He is alert and oriented to person, place, and time. Mental status is at baseline.      Cranial Nerves: No cranial nerve deficit.      Sensory: No sensory deficit.      Motor: No weakness.      Gait: Gait normal.   Psychiatric:         Mood and  Affect: Mood normal.         Behavior: Behavior normal.         Judgment: Judgment normal.      Comments: No suicidal or homicidal ideation.  Reports generalized anxiety          LAB:  All pertinent labs reviewed and interpreted.  Results for orders placed or performed during the hospital encounter of 07/15/21   Result Value Ref Range    Lipase 70 (H) 0 - 52 U/L   Comprehensive metabolic panel   Result Value Ref Range    Sodium 140 136 - 145 mmol/L    Potassium 3.8 3.5 - 5.0 mmol/L    Chloride 106 98 - 107 mmol/L    Carbon Dioxide (CO2) 25 22 - 31 mmol/L    Anion Gap 9 5 - 18 mmol/L    Urea Nitrogen 14 8 - 22 mg/dL    Creatinine 0.76 0.70 - 1.30 mg/dL    Calcium 8.0 (L) 8.5 - 10.5 mg/dL    Glucose 106 70 - 125 mg/dL    Alkaline Phosphatase 112 45 - 120 U/L    AST 77 (H) 0 - 40 U/L    ALT 85 (H) 0 - 45 U/L    Protein Total 8.0 6.0 - 8.0 g/dL    Albumin 4.0 3.5 - 5.0 g/dL    Bilirubin Total 0.4 0.0 - 1.0 mg/dL    GFR Estimate >90 >60 mL/min/1.73m2   Ethyl Alcohol Level   Result Value Ref Range    Alcohol, Blood 277 (H) None detected mg/dL   CBC with platelets and differential   Result Value Ref Range    WBC Count 10.6 4.0 - 11.0 10e3/uL    RBC Count 5.43 4.40 - 5.90 10e6/uL    Hemoglobin 15.6 13.3 - 17.7 g/dL    Hematocrit 45.6 40.0 - 53.0 %    MCV 84 78 - 100 fL    MCH 28.7 26.5 - 33.0 pg    MCHC 34.2 31.5 - 36.5 g/dL    RDW 13.2 10.0 - 15.0 %    Platelet Count 344 150 - 450 10e3/uL    % Neutrophils 79 %    % Lymphocytes 16 %    % Monocytes 3 %    % Eosinophils 0 %    % Basophils 1 %    % Immature Granulocytes 1 %    NRBCs per 100 WBC 0 <1 /100    Absolute Neutrophils 8.4 (H) 1.6 - 8.3 10e3/uL    Absolute Lymphocytes 1.7 0.8 - 5.3 10e3/uL    Absolute Monocytes 0.4 0.0 - 1.3 10e3/uL    Absolute Eosinophils 0.0 0.0 - 0.7 10e3/uL    Absolute Basophils 0.1 0.0 - 0.2 10e3/uL    Absolute Immature Granulocytes 0.1 (H) <=0.0 10e3/uL    Absolute NRBCs 0.0 10e3/uL       RADIOLOGY:  Reviewed all pertinent imaging. Please see  official radiology report.  No orders to display           I, Dg Lzoa, am serving as a scribe to document services personally performed by Mando Gutierres PA-C based on my observation and the provider's statements to me. I, Mando Gutierres PA-C attest that Dg Loza is acting in a scribe capacity, has observed my performance of the services and has documented them in accordance with my direction.    Mando Gutierres PA-C  Emergency Medicine  [facility]       Mando Gutierres PA-C  07/15/21 1028

## 2021-07-15 NOTE — DISCHARGE INSTRUCTIONS
Social work has made an appointment with you for Monday morning with psychiatry to help manage your chronic anxiety as well as chemical dependency.  We are sending you to a detox facility to help get you sober and go through the withdrawal symptoms.  They will take care of any short-term medications that are needed, for withdrawal symptoms.

## 2021-11-01 ENCOUNTER — HOSPITAL ENCOUNTER (EMERGENCY)
Facility: CLINIC | Age: 36
Discharge: HOME | End: 2021-11-02
Attending: EMERGENCY MEDICINE | Admitting: EMERGENCY MEDICINE
Payer: COMMERCIAL

## 2021-11-01 DIAGNOSIS — F10.10 ALCOHOL ABUSE: ICD-10-CM

## 2021-11-01 DIAGNOSIS — F10.920 ALCOHOLIC INTOXICATION WITHOUT COMPLICATION (H): ICD-10-CM

## 2021-11-01 LAB
ALBUMIN SERPL-MCNC: 3.8 G/DL (ref 3.4–5)
ALP SERPL-CCNC: 79 U/L (ref 40–150)
ALT SERPL W P-5'-P-CCNC: 41 U/L (ref 0–70)
ANION GAP SERPL CALCULATED.3IONS-SCNC: 5 MMOL/L (ref 3–14)
AST SERPL W P-5'-P-CCNC: 39 U/L (ref 0–45)
BASOPHILS # BLD AUTO: 0.1 10E3/UL (ref 0–0.2)
BASOPHILS NFR BLD AUTO: 1 %
BILIRUB SERPL-MCNC: 0.2 MG/DL (ref 0.2–1.3)
BUN SERPL-MCNC: 9 MG/DL (ref 7–30)
CALCIUM SERPL-MCNC: 8 MG/DL (ref 8.5–10.1)
CHLORIDE BLD-SCNC: 108 MMOL/L (ref 94–109)
CO2 SERPL-SCNC: 28 MMOL/L (ref 20–32)
CREAT SERPL-MCNC: 0.81 MG/DL (ref 0.66–1.25)
EOSINOPHIL # BLD AUTO: 0 10E3/UL (ref 0–0.7)
EOSINOPHIL NFR BLD AUTO: 0 %
ERYTHROCYTE [DISTWIDTH] IN BLOOD BY AUTOMATED COUNT: 13 % (ref 10–15)
ETHANOL SERPL-MCNC: 0.37 G/DL
GFR SERPL CREATININE-BSD FRML MDRD: >90 ML/MIN/1.73M2
GLUCOSE BLD-MCNC: 126 MG/DL (ref 70–99)
HCT VFR BLD AUTO: 43.7 % (ref 40–53)
HGB BLD-MCNC: 14.6 G/DL (ref 13.3–17.7)
IMM GRANULOCYTES # BLD: 0 10E3/UL
IMM GRANULOCYTES NFR BLD: 0 %
LYMPHOCYTES # BLD AUTO: 3.3 10E3/UL (ref 0.8–5.3)
LYMPHOCYTES NFR BLD AUTO: 40 %
MCH RBC QN AUTO: 28.4 PG (ref 26.5–33)
MCHC RBC AUTO-ENTMCNC: 33.4 G/DL (ref 31.5–36.5)
MCV RBC AUTO: 85 FL (ref 78–100)
MONOCYTES # BLD AUTO: 0.5 10E3/UL (ref 0–1.3)
MONOCYTES NFR BLD AUTO: 6 %
NEUTROPHILS # BLD AUTO: 4.4 10E3/UL (ref 1.6–8.3)
NEUTROPHILS NFR BLD AUTO: 53 %
NRBC # BLD AUTO: 0 10E3/UL
NRBC BLD AUTO-RTO: 0 /100
PLATELET # BLD AUTO: 354 10E3/UL (ref 150–450)
POTASSIUM BLD-SCNC: 3.3 MMOL/L (ref 3.4–5.3)
PROT SERPL-MCNC: 8.2 G/DL (ref 6.8–8.8)
RBC # BLD AUTO: 5.14 10E6/UL (ref 4.4–5.9)
SODIUM SERPL-SCNC: 141 MMOL/L (ref 133–144)
WBC # BLD AUTO: 8.3 10E3/UL (ref 4–11)

## 2021-11-01 PROCEDURE — C9803 HOPD COVID-19 SPEC COLLECT: HCPCS

## 2021-11-01 PROCEDURE — 36415 COLL VENOUS BLD VENIPUNCTURE: CPT | Performed by: EMERGENCY MEDICINE

## 2021-11-01 PROCEDURE — 258N000003 HC RX IP 258 OP 636: Performed by: EMERGENCY MEDICINE

## 2021-11-01 PROCEDURE — 250N000009 HC RX 250: Performed by: EMERGENCY MEDICINE

## 2021-11-01 PROCEDURE — 99285 EMERGENCY DEPT VISIT HI MDM: CPT | Mod: 25

## 2021-11-01 PROCEDURE — 82077 ASSAY SPEC XCP UR&BREATH IA: CPT | Performed by: EMERGENCY MEDICINE

## 2021-11-01 PROCEDURE — 96375 TX/PRO/DX INJ NEW DRUG ADDON: CPT

## 2021-11-01 PROCEDURE — 96365 THER/PROPH/DIAG IV INF INIT: CPT

## 2021-11-01 PROCEDURE — 85025 COMPLETE CBC W/AUTO DIFF WBC: CPT | Performed by: EMERGENCY MEDICINE

## 2021-11-01 PROCEDURE — 96361 HYDRATE IV INFUSION ADD-ON: CPT

## 2021-11-01 PROCEDURE — 250N000011 HC RX IP 250 OP 636: Performed by: EMERGENCY MEDICINE

## 2021-11-01 PROCEDURE — 96366 THER/PROPH/DIAG IV INF ADDON: CPT

## 2021-11-01 PROCEDURE — 82040 ASSAY OF SERUM ALBUMIN: CPT | Performed by: EMERGENCY MEDICINE

## 2021-11-01 RX ORDER — LORAZEPAM 2 MG/ML
1 INJECTION INTRAMUSCULAR ONCE
Status: COMPLETED | OUTPATIENT
Start: 2021-11-01 | End: 2021-11-02

## 2021-11-01 RX ADMIN — FOLIC ACID: 5 INJECTION, SOLUTION INTRAMUSCULAR; INTRAVENOUS; SUBCUTANEOUS at 23:53

## 2021-11-01 RX ADMIN — SODIUM CHLORIDE 1000 ML: 9 INJECTION, SOLUTION INTRAVENOUS at 22:57

## 2021-11-02 ENCOUNTER — HOSPITAL ENCOUNTER (INPATIENT)
Facility: CLINIC | Age: 36
End: 2021-11-02
Attending: PSYCHIATRY & NEUROLOGY | Admitting: PSYCHIATRY & NEUROLOGY
Payer: COMMERCIAL

## 2021-11-02 ENCOUNTER — TELEPHONE (OUTPATIENT)
Dept: BEHAVIORAL HEALTH | Facility: CLINIC | Age: 36
End: 2021-11-02

## 2021-11-02 VITALS
SYSTOLIC BLOOD PRESSURE: 107 MMHG | OXYGEN SATURATION: 93 % | WEIGHT: 180 LBS | HEART RATE: 83 BPM | TEMPERATURE: 98.1 F | DIASTOLIC BLOOD PRESSURE: 76 MMHG | BODY MASS INDEX: 28.19 KG/M2 | RESPIRATION RATE: 18 BRPM

## 2021-11-02 LAB
AMPHETAMINES UR QL SCN: ABNORMAL
BARBITURATES UR QL: ABNORMAL
BENZODIAZ UR QL: ABNORMAL
CANNABINOIDS UR QL SCN: ABNORMAL
COCAINE UR QL: ABNORMAL
HOLD SPECIMEN: NORMAL
HOLD SPECIMEN: NORMAL
OPIATES UR QL SCN: ABNORMAL
PCP UR QL SCN: ABNORMAL
SARS-COV-2 RNA RESP QL NAA+PROBE: NEGATIVE

## 2021-11-02 PROCEDURE — 80307 DRUG TEST PRSMV CHEM ANLYZR: CPT | Performed by: EMERGENCY MEDICINE

## 2021-11-02 PROCEDURE — 250N000013 HC RX MED GY IP 250 OP 250 PS 637: Performed by: EMERGENCY MEDICINE

## 2021-11-02 PROCEDURE — 250N000011 HC RX IP 250 OP 636: Performed by: EMERGENCY MEDICINE

## 2021-11-02 PROCEDURE — 87635 SARS-COV-2 COVID-19 AMP PRB: CPT | Performed by: EMERGENCY MEDICINE

## 2021-11-02 RX ORDER — LANOLIN ALCOHOL/MO/W.PET/CERES
100 CREAM (GRAM) TOPICAL DAILY
Status: DISCONTINUED | OUTPATIENT
Start: 2021-11-02 | End: 2021-11-02 | Stop reason: HOSPADM

## 2021-11-02 RX ORDER — LORAZEPAM 1 MG/1
1 TABLET ORAL ONCE
Status: COMPLETED | OUTPATIENT
Start: 2021-11-02 | End: 2021-11-02

## 2021-11-02 RX ORDER — MULTIPLE VITAMINS W/ MINERALS TAB 9MG-400MCG
1 TAB ORAL DAILY
Status: DISCONTINUED | OUTPATIENT
Start: 2021-11-02 | End: 2021-11-02 | Stop reason: HOSPADM

## 2021-11-02 RX ORDER — ONDANSETRON 2 MG/ML
4 INJECTION INTRAMUSCULAR; INTRAVENOUS
Status: COMPLETED | OUTPATIENT
Start: 2021-11-02 | End: 2021-11-02

## 2021-11-02 RX ORDER — HALOPERIDOL 5 MG/ML
2.5-5 INJECTION INTRAMUSCULAR EVERY 6 HOURS PRN
Status: DISCONTINUED | OUTPATIENT
Start: 2021-11-02 | End: 2021-11-02 | Stop reason: HOSPADM

## 2021-11-02 RX ORDER — LORAZEPAM 2 MG/ML
1-2 INJECTION INTRAMUSCULAR EVERY 30 MIN PRN
Status: DISCONTINUED | OUTPATIENT
Start: 2021-11-02 | End: 2021-11-02 | Stop reason: HOSPADM

## 2021-11-02 RX ORDER — FOLIC ACID 1 MG/1
1 TABLET ORAL DAILY
Status: DISCONTINUED | OUTPATIENT
Start: 2021-11-02 | End: 2021-11-02 | Stop reason: HOSPADM

## 2021-11-02 RX ORDER — LORAZEPAM 1 MG/1
1-2 TABLET ORAL EVERY 30 MIN PRN
Status: DISCONTINUED | OUTPATIENT
Start: 2021-11-02 | End: 2021-11-02 | Stop reason: HOSPADM

## 2021-11-02 RX ORDER — OLANZAPINE 5 MG/1
5-10 TABLET, ORALLY DISINTEGRATING ORAL EVERY 6 HOURS PRN
Status: DISCONTINUED | OUTPATIENT
Start: 2021-11-02 | End: 2021-11-02 | Stop reason: HOSPADM

## 2021-11-02 RX ORDER — LORAZEPAM 2 MG/ML
1 INJECTION INTRAMUSCULAR ONCE
Status: COMPLETED | OUTPATIENT
Start: 2021-11-02 | End: 2021-11-02

## 2021-11-02 RX ADMIN — FOLIC ACID 1 MG: 1 TABLET ORAL at 08:38

## 2021-11-02 RX ADMIN — LORAZEPAM 1 MG: 2 INJECTION INTRAMUSCULAR; INTRAVENOUS at 07:53

## 2021-11-02 RX ADMIN — LORAZEPAM 1 MG: 1 TABLET ORAL at 10:59

## 2021-11-02 RX ADMIN — LORAZEPAM 1 MG: 2 INJECTION INTRAMUSCULAR; INTRAVENOUS at 08:19

## 2021-11-02 RX ADMIN — MULTIPLE VITAMINS W/ MINERALS TAB 1 TABLET: TAB at 08:38

## 2021-11-02 RX ADMIN — ONDANSETRON 4 MG: 2 INJECTION INTRAMUSCULAR; INTRAVENOUS at 08:20

## 2021-11-02 RX ADMIN — THIAMINE HCL TAB 100 MG 100 MG: 100 TAB at 08:38

## 2021-11-02 NOTE — TELEPHONE ENCOUNTER
Patient cleared and ready for behavioral bed placement: Yes  S: 0337 ED MD called to refer 36/M in [facility] ED for detox    B: Pt reports drinking 1 bottle of Fireball daily for the last 5 days. LD a few hours PTA. BAL 0.37. Hx of seizures - last 10 years ago. Hx of DTs. Pt denies other drug use. Pt denies SI and HI. Pt is calm and cooperative    A: Voluntary  No acute medical concerns. Ambulates independently  Covid test processing  CBC: WNL  CMP: Potassium 3.3 / Calcium 8.0 / Glucose 126  UDS: needs to be collected    R: 3A / Sheela / JOSEFINA  0410 - Paged on call. 0417 - On call accepts for 3AW. Placed pt in queue and notified unit. Report on day shift as unit has other pending admission in queue. [id number] - Notified ED.

## 2021-11-02 NOTE — ED PROVIDER NOTES
Patient signed out to me pending alcohol intoxication and possible detox transfer    Patient intoxicated, recent relapse. Hx of seizures from withdrawal    Patient would like to go to detox. Bed at [facility] after 10 am hopefully  I spoke to intake about patient    Patient signed out to Dr. Douglas pending hopefully transfer to detox. No mental health concerns     Kat Keller MD  11/02/21 0609

## 2021-11-02 NOTE — ED TRIAGE NOTES
Pt was recently sober. Started drinking heavily since Friday. Has drank numerous bottles of Fireball since then. Hx of seizures with withdrawal.

## 2021-11-02 NOTE — ED PROVIDER NOTES
History     Chief Complaint:    Withdrawal      HPI   Aly Solitario Jr. is a 36 year old male who presents with history of alcohol abuse and recent drinking binge since Friday.  He started drinking after being sober for 6 months.  He notes he started drinking just out of habit and for fun.  He denies being suicidal.  His sister got upset with him for drinking, so about 8 hours ago he stopped drinking from social pressure and started feeling anxious, similar to how he feels prior to having a withdrawal seizure.  He had a seizure about 10 years ago, but nothing recent.  He has been drinking fireball, about a bottle a day for 3-4 days.  He is suppose to be taking gabapentin and hydroxyzine, but hasn't been taking them recently.  He is currently living with his mother.    Review of Systems  + feels anxious  - denies vomiting, significant abdominal pain, auditory or visual hallucinations, diaphoresis, homicidal or suicidal thoughts all other systems negative    Allergies:      No Known Allergies      Medications:      chlordiazePOXIDE (LIBRIUM) 25 MG capsule  gabapentin (NEURONTIN) 100 MG capsule  hydrOXYzine (VISTARIL) 25 MG capsule  LORazepam (ATIVAN) 0.5 MG tablet  triamterene-HCTZ (DYAZIDE) 37.5-25 MG capsule        Past Medical History:        Past Medical History:   Diagnosis Date     Alcohol abuse      Pancreatitis      Substance abuse (H)      Patient Active Problem List    Diagnosis Date Noted     Acute alcoholic intoxication in alcoholism without complication (H) 01/29/2021     Priority: Medium     Depression, unspecified depression type 01/29/2021     Priority: Medium     Infection due to 2019 novel coronavirus 01/29/2021     Priority: Medium        Past Surgical History:        Past Surgical History:   Procedure Laterality Date     SOFT TISSUE SURGERY      arm surgery with graft due to artery issue       Family History:        No family history on file.    Social History:  History of alcohol abuse has  been through treatment once currently living with his mother drinking fireball 1 bottle a day for 3 to 4 days    Physical Exam     Patient Vitals for the past 24 hrs:   BP Temp Temp src Pulse Resp SpO2 Weight   11/01/21 2300 123/82 -- -- 106 19 95 % --   11/01/21 2242 (!) 146/99 98.1  F (36.7  C) Temporal (!) 124 18 96 % 81.6 kg (180 lb)       Physical Exam  GENERAL: well developed, pleasant  HEAD: atraumatic  EYES: pupils reactive, extraocular muscles intact, conjunctivae normal  ENT:  mucus membranes moist  NECK:  trachea midline, normal range of motion  RESPIRATORY: no tachypnea, breath sounds clear to auscultation   CVS: normal S1/S2, no murmurs, intact distal pulses  ABDOMEN: soft, nontender, nondistention  MUSCULOSKELETAL: no deformities  SKIN: warm and dry, no acute rashes or ulceration  NEURO: GCS 15, cranial nerves intact, alert and oriented x3, no significant tremor with outstretched hands  PSYCH:  Mood/affect normal        Emergency Department Course     Imaging:  No orders to display       Laboratory:  Labs Ordered and Resulted from Time of ED Arrival to Time of ED Departure   COMPREHENSIVE METABOLIC PANEL - Abnormal       Result Value    Sodium 141      Potassium 3.3 (*)     Chloride 108      Carbon Dioxide (CO2) 28      Anion Gap 5      Urea Nitrogen 9      Creatinine 0.81      Calcium 8.0 (*)     Glucose 126 (*)     Alkaline Phosphatase 79      AST 39      ALT 41      Protein Total 8.2      Albumin 3.8      Bilirubin Total 0.2      GFR Estimate >90     ETHYL ALCOHOL LEVEL - Abnormal    Alcohol ethyl 0.37 (*)    CBC WITH PLATELETS AND DIFFERENTIAL    WBC Count 8.3      RBC Count 5.14      Hemoglobin 14.6      Hematocrit 43.7      MCV 85      MCH 28.4      MCHC 33.4      RDW 13.0      Platelet Count 354      % Neutrophils 53      % Lymphocytes 40      % Monocytes 6      % Eosinophils 0      % Basophils 1      % Immature Granulocytes 0      NRBCs per 100 WBC 0      Absolute Neutrophils 4.4      Absolute  Lymphocytes 3.3      Absolute Monocytes 0.5      Absolute Eosinophils 0.0      Absolute Basophils 0.1      Absolute Immature Granulocytes 0.0      Absolute NRBCs 0.0         Procedures:  na    Emergency Department Course:    Reviewed:    I reviewed nursing notes, vitals, past history and care everywhere    Assessments:   I obtained history and examined the patient as noted above.   0110 I rechecked the patient and explained findings.       Consults:            Interventions:    Medications   LORazepam (ATIVAN) injection 1 mg (has no administration in time range)   0.9% sodium chloride BOLUS (0 mLs Intravenous Stopped 11/1/21 8991)   sodium chloride 0.9 % 1,000 mL with Infuvite Adult 10 mL, thiamine 100 mg, folic acid 1 mg infusion ( Intravenous New Bag 11/1/21 6043)       Disposition:  Care of the patient was transferred to my colleague, Dr. Keller, pending detox transfer.    Impression & Plan            CMS Diagnoses:        Medical Decision Making:  Patient presents with history of alcohol abuse and recent binge since Friday.  He reports alcohol withdrawal seizure although looking through different notes this was about 10 years ago.  Denies any suicidal thoughts.  Do not see any signs of withdrawal at this time and his initial alcohol is elevated as noted above.  Initially was given Ativan and fluids.  Went back in and talk to him and discussed treatment plans with him and he would like detox if available.  Currently looking for detox availability.  Patient be signed out to my partner Dr. Keller awaiting detox versus ongoing sobriety.    Covid-19  Aly GONSALEZ Kassi Be was evaluated during a global COVID-19 pandemic, which necessitated consideration that the patient might be at risk for infection with the SARS-CoV-2 virus that causes COVID-19.   Applicable protocols for evaluation were followed during the patient's care.   COVID-19 was considered as part of the patient's evaluation. The plan for testing is:  a test  was obtained during this visit.    Diagnosis:    ICD-10-CM    1. Alcoholic intoxication without complication (H)  F10.920    2. Alcohol abuse  F10.10        Discharge Medications:  New Prescriptions    No medications on file         Scribe Disclosure:  Melecio COCHRAN MD, am serving as a scribe at 11:06 PM on 11/1/2021 to document services personally performed by Melecio Key MD based on my observations and the provider's statements to me.      Melecio Key MD  11/02/21 0115

## 2021-11-02 NOTE — TELEPHONE ENCOUNTER
R:  11:30 AM  Patient discharged from [facility] ED.  Removed from 3A work queue.  Notified 3A charge nurse.

## 2021-11-02 NOTE — ED PROVIDER NOTES
Patient was signed out to me this morning.  The patient was pending an admission to [facility] for detox.  Now that the patient is feeling more calm and clinically sober, he does not wish to be admitted and wishes to go home and follow-up with outpatient resources.  The care manager has provided him with outpatient resources.  The patient is clinically stable with normal vital signs at this time.  He has been in alcohol withdrawal and has had one alcohol withdrawal seizure in the very remote past.  The patient is normal appearing at this time after some intravenous doses of lorazepam earlier in the shift based on the alcohol withdrawal protocol.  The patient will be discharged at this time with outpatient resources.     Levi Douglas MD  11/02/21 1055

## 2021-11-02 NOTE — ED NOTES
I was informed this patient would like to go home instead of being admitted for treatment.  I gave him some community resources to help him with treatment as an OP.  I updated the ER staff when I was done speaking with this patient.  I have completed this consult.

## 2021-11-02 NOTE — DISCHARGE INSTRUCTIONS
The care manager has provided you with outpatient resources for alcoholism.  Please follow-up with these resources as soon as possible.  Please return to the emergency department if you develop significant withdrawal symptoms.

## 2021-11-12 ENCOUNTER — HOSPITAL ENCOUNTER (INPATIENT)
Facility: CLINIC | Age: 36
LOS: 2 days | Discharge: HOME | End: 2021-11-14
Attending: PSYCHIATRY & NEUROLOGY | Admitting: PSYCHIATRY & NEUROLOGY
Payer: COMMERCIAL

## 2021-11-12 ENCOUNTER — HOSPITAL ENCOUNTER (EMERGENCY)
Facility: CLINIC | Age: 36
Discharge: OTHER INSTITUTION | End: 2021-11-12
Attending: EMERGENCY MEDICINE | Admitting: EMERGENCY MEDICINE
Payer: COMMERCIAL

## 2021-11-12 VITALS
HEART RATE: 103 BPM | WEIGHT: 180 LBS | OXYGEN SATURATION: 95 % | HEIGHT: 67 IN | RESPIRATION RATE: 18 BRPM | BODY MASS INDEX: 28.25 KG/M2 | DIASTOLIC BLOOD PRESSURE: 105 MMHG | SYSTOLIC BLOOD PRESSURE: 142 MMHG | TEMPERATURE: 97.7 F

## 2021-11-12 DIAGNOSIS — F41.9 ANXIETY: ICD-10-CM

## 2021-11-12 DIAGNOSIS — F10.220 ACUTE ALCOHOLIC INTOXICATION IN ALCOHOLISM WITHOUT COMPLICATION (H): ICD-10-CM

## 2021-11-12 PROBLEM — F10.939 ALCOHOL WITHDRAWAL (H): Status: ACTIVE | Noted: 2021-11-12

## 2021-11-12 LAB
ALBUMIN SERPL-MCNC: 3.7 G/DL (ref 3.4–5)
ALBUMIN UR-MCNC: 100 MG/DL
ALCOHOL BREATH TEST: 0.22 (ref 0–0.01)
ALP SERPL-CCNC: 124 U/L (ref 40–150)
ALT SERPL W P-5'-P-CCNC: 80 U/L (ref 0–70)
AMPHETAMINES UR QL SCN: ABNORMAL
ANION GAP SERPL CALCULATED.3IONS-SCNC: 8 MMOL/L (ref 3–14)
APPEARANCE UR: CLEAR
AST SERPL W P-5'-P-CCNC: 104 U/L (ref 0–45)
BARBITURATES UR QL: ABNORMAL
BASOPHILS # BLD AUTO: 0 10E3/UL (ref 0–0.2)
BASOPHILS NFR BLD AUTO: 1 %
BENZODIAZ UR QL: ABNORMAL
BILIRUB SERPL-MCNC: 0.4 MG/DL (ref 0.2–1.3)
BILIRUB UR QL STRIP: NEGATIVE
BUN SERPL-MCNC: 8 MG/DL (ref 7–30)
CALCIUM SERPL-MCNC: 8 MG/DL (ref 8.5–10.1)
CANNABINOIDS UR QL SCN: ABNORMAL
CHLORIDE BLD-SCNC: 102 MMOL/L (ref 94–109)
CO2 SERPL-SCNC: 27 MMOL/L (ref 20–32)
COCAINE UR QL: ABNORMAL
COLOR UR AUTO: YELLOW
CREAT SERPL-MCNC: 0.71 MG/DL (ref 0.66–1.25)
EOSINOPHIL # BLD AUTO: 0 10E3/UL (ref 0–0.7)
EOSINOPHIL NFR BLD AUTO: 0 %
ERYTHROCYTE [DISTWIDTH] IN BLOOD BY AUTOMATED COUNT: 13.2 % (ref 10–15)
GFR SERPL CREATININE-BSD FRML MDRD: >90 ML/MIN/1.73M2
GLUCOSE BLD-MCNC: 152 MG/DL (ref 70–99)
GLUCOSE UR STRIP-MCNC: NEGATIVE MG/DL
HCT VFR BLD AUTO: 43.9 % (ref 40–53)
HGB BLD-MCNC: 15.4 G/DL (ref 13.3–17.7)
HGB UR QL STRIP: ABNORMAL
IMM GRANULOCYTES # BLD: 0 10E3/UL
IMM GRANULOCYTES NFR BLD: 0 %
KETONES UR STRIP-MCNC: NEGATIVE MG/DL
LEUKOCYTE ESTERASE UR QL STRIP: NEGATIVE
LYMPHOCYTES # BLD AUTO: 1.3 10E3/UL (ref 0.8–5.3)
LYMPHOCYTES NFR BLD AUTO: 27 %
MCH RBC QN AUTO: 29.3 PG (ref 26.5–33)
MCHC RBC AUTO-ENTMCNC: 35.1 G/DL (ref 31.5–36.5)
MCV RBC AUTO: 84 FL (ref 78–100)
MONOCYTES # BLD AUTO: 0.6 10E3/UL (ref 0–1.3)
MONOCYTES NFR BLD AUTO: 11 %
MUCOUS THREADS #/AREA URNS LPF: PRESENT /LPF
NEUTROPHILS # BLD AUTO: 3 10E3/UL (ref 1.6–8.3)
NEUTROPHILS NFR BLD AUTO: 61 %
NITRATE UR QL: NEGATIVE
NRBC # BLD AUTO: 0 10E3/UL
NRBC BLD AUTO-RTO: 0 /100
OPIATES UR QL SCN: ABNORMAL
PCP UR QL SCN: ABNORMAL
PH UR STRIP: 6 [PH] (ref 5–7)
PLATELET # BLD AUTO: 258 10E3/UL (ref 150–450)
POTASSIUM BLD-SCNC: 2.9 MMOL/L (ref 3.4–5.3)
POTASSIUM BLD-SCNC: 3.8 MMOL/L (ref 3.4–5.3)
PROT SERPL-MCNC: 8.2 G/DL (ref 6.8–8.8)
RBC # BLD AUTO: 5.26 10E6/UL (ref 4.4–5.9)
RBC URINE: <1 /HPF
SARS-COV-2 RNA RESP QL NAA+PROBE: NEGATIVE
SODIUM SERPL-SCNC: 137 MMOL/L (ref 133–144)
SP GR UR STRIP: 1.02 (ref 1–1.03)
UROBILINOGEN UR STRIP-MCNC: NORMAL MG/DL
WBC # BLD AUTO: 5 10E3/UL (ref 4–11)
WBC URINE: <1 /HPF

## 2021-11-12 PROCEDURE — 36415 COLL VENOUS BLD VENIPUNCTURE: CPT | Performed by: EMERGENCY MEDICINE

## 2021-11-12 PROCEDURE — 99207 PR CONSULT E&M CHANGED TO INITIAL LEVEL: CPT | Performed by: PHYSICIAN ASSISTANT

## 2021-11-12 PROCEDURE — 81001 URINALYSIS AUTO W/SCOPE: CPT | Performed by: EMERGENCY MEDICINE

## 2021-11-12 PROCEDURE — 87635 SARS-COV-2 COVID-19 AMP PRB: CPT | Performed by: EMERGENCY MEDICINE

## 2021-11-12 PROCEDURE — 128N000004 HC R&B CD ADULT

## 2021-11-12 PROCEDURE — 84132 ASSAY OF SERUM POTASSIUM: CPT | Mod: 91 | Performed by: EMERGENCY MEDICINE

## 2021-11-12 PROCEDURE — 99223 1ST HOSP IP/OBS HIGH 75: CPT | Mod: AI | Performed by: PSYCHIATRY & NEUROLOGY

## 2021-11-12 PROCEDURE — 250N000013 HC RX MED GY IP 250 OP 250 PS 637: Performed by: PSYCHIATRY & NEUROLOGY

## 2021-11-12 PROCEDURE — 250N000009 HC RX 250: Performed by: PHYSICIAN ASSISTANT

## 2021-11-12 PROCEDURE — 96374 THER/PROPH/DIAG INJ IV PUSH: CPT

## 2021-11-12 PROCEDURE — C9803 HOPD COVID-19 SPEC COLLECT: HCPCS

## 2021-11-12 PROCEDURE — 250N000013 HC RX MED GY IP 250 OP 250 PS 637: Performed by: PHYSICIAN ASSISTANT

## 2021-11-12 PROCEDURE — 85025 COMPLETE CBC W/AUTO DIFF WBC: CPT | Performed by: EMERGENCY MEDICINE

## 2021-11-12 PROCEDURE — 80307 DRUG TEST PRSMV CHEM ANLYZR: CPT | Performed by: EMERGENCY MEDICINE

## 2021-11-12 PROCEDURE — 99221 1ST HOSP IP/OBS SF/LOW 40: CPT | Performed by: PHYSICIAN ASSISTANT

## 2021-11-12 PROCEDURE — 250N000013 HC RX MED GY IP 250 OP 250 PS 637: Performed by: EMERGENCY MEDICINE

## 2021-11-12 PROCEDURE — 82075 ASSAY OF BREATH ETHANOL: CPT

## 2021-11-12 PROCEDURE — 80053 COMPREHEN METABOLIC PANEL: CPT | Performed by: EMERGENCY MEDICINE

## 2021-11-12 PROCEDURE — 99285 EMERGENCY DEPT VISIT HI MDM: CPT | Mod: 25

## 2021-11-12 PROCEDURE — 250N000011 HC RX IP 250 OP 636: Performed by: EMERGENCY MEDICINE

## 2021-11-12 RX ORDER — MULTIPLE VITAMINS W/ MINERALS TAB 9MG-400MCG
1 TAB ORAL DAILY
Status: DISCONTINUED | OUTPATIENT
Start: 2021-11-13 | End: 2021-11-14 | Stop reason: HOSPADM

## 2021-11-12 RX ORDER — MAGNESIUM OXIDE 400 MG/1
800 TABLET ORAL ONCE
Status: COMPLETED | OUTPATIENT
Start: 2021-11-12 | End: 2021-11-12

## 2021-11-12 RX ORDER — DIAZEPAM 5 MG
5-20 TABLET ORAL EVERY 30 MIN PRN
Status: DISCONTINUED | OUTPATIENT
Start: 2021-11-12 | End: 2021-11-14 | Stop reason: HOSPADM

## 2021-11-12 RX ORDER — ATENOLOL 50 MG/1
50 TABLET ORAL DAILY PRN
Status: DISCONTINUED | OUTPATIENT
Start: 2021-11-12 | End: 2021-11-14 | Stop reason: HOSPADM

## 2021-11-12 RX ORDER — FOLIC ACID 1 MG/1
1 TABLET ORAL ONCE
Status: COMPLETED | OUTPATIENT
Start: 2021-11-12 | End: 2021-11-12

## 2021-11-12 RX ORDER — IBUPROFEN 400 MG/1
400 TABLET, FILM COATED ORAL ONCE
Status: COMPLETED | OUTPATIENT
Start: 2021-11-12 | End: 2021-11-12

## 2021-11-12 RX ORDER — ONDANSETRON 4 MG/1
4 TABLET, ORALLY DISINTEGRATING ORAL EVERY 6 HOURS PRN
Status: DISCONTINUED | OUTPATIENT
Start: 2021-11-12 | End: 2021-11-14 | Stop reason: HOSPADM

## 2021-11-12 RX ORDER — LORAZEPAM 0.5 MG/1
1 TABLET ORAL ONCE
Status: COMPLETED | OUTPATIENT
Start: 2021-11-12 | End: 2021-11-12

## 2021-11-12 RX ORDER — AMLODIPINE BESYLATE 2.5 MG/1
2.5 TABLET ORAL DAILY
Status: DISCONTINUED | OUTPATIENT
Start: 2021-11-12 | End: 2021-11-14 | Stop reason: HOSPADM

## 2021-11-12 RX ORDER — PANTOPRAZOLE SODIUM 40 MG/1
40 TABLET, DELAYED RELEASE ORAL
Status: DISCONTINUED | OUTPATIENT
Start: 2021-11-13 | End: 2021-11-14 | Stop reason: HOSPADM

## 2021-11-12 RX ORDER — ACETAMINOPHEN 325 MG/1
650 TABLET ORAL EVERY 4 HOURS PRN
Status: DISCONTINUED | OUTPATIENT
Start: 2021-11-12 | End: 2021-11-14 | Stop reason: HOSPADM

## 2021-11-12 RX ORDER — TRAZODONE HYDROCHLORIDE 50 MG/1
50 TABLET, FILM COATED ORAL
Status: DISCONTINUED | OUTPATIENT
Start: 2021-11-12 | End: 2021-11-14 | Stop reason: HOSPADM

## 2021-11-12 RX ORDER — MAGNESIUM HYDROXIDE/ALUMINUM HYDROXICE/SIMETHICONE 120; 1200; 1200 MG/30ML; MG/30ML; MG/30ML
30 SUSPENSION ORAL EVERY 4 HOURS PRN
Status: DISCONTINUED | OUTPATIENT
Start: 2021-11-12 | End: 2021-11-14 | Stop reason: HOSPADM

## 2021-11-12 RX ORDER — MULTIVITAMIN,THERAPEUTIC
1 TABLET ORAL ONCE
Status: COMPLETED | OUTPATIENT
Start: 2021-11-12 | End: 2021-11-12

## 2021-11-12 RX ORDER — LOPERAMIDE HCL 2 MG
2 CAPSULE ORAL 4 TIMES DAILY PRN
Status: DISCONTINUED | OUTPATIENT
Start: 2021-11-12 | End: 2021-11-14 | Stop reason: HOSPADM

## 2021-11-12 RX ORDER — GABAPENTIN 100 MG/1
100 CAPSULE ORAL EVERY 6 HOURS PRN
Status: DISCONTINUED | OUTPATIENT
Start: 2021-11-12 | End: 2021-11-14 | Stop reason: HOSPADM

## 2021-11-12 RX ORDER — HYDROXYZINE HYDROCHLORIDE 25 MG/1
25 TABLET, FILM COATED ORAL EVERY 4 HOURS PRN
Status: DISCONTINUED | OUTPATIENT
Start: 2021-11-12 | End: 2021-11-14 | Stop reason: HOSPADM

## 2021-11-12 RX ORDER — LORAZEPAM 2 MG/ML
1 INJECTION INTRAMUSCULAR ONCE
Status: COMPLETED | OUTPATIENT
Start: 2021-11-12 | End: 2021-11-12

## 2021-11-12 RX ORDER — FOLIC ACID 1 MG/1
1 TABLET ORAL DAILY
Status: DISCONTINUED | OUTPATIENT
Start: 2021-11-13 | End: 2021-11-14 | Stop reason: HOSPADM

## 2021-11-12 RX ORDER — NICOTINE 21 MG/24HR
1 PATCH, TRANSDERMAL 24 HOURS TRANSDERMAL DAILY
Status: DISCONTINUED | OUTPATIENT
Start: 2021-11-12 | End: 2021-11-14 | Stop reason: HOSPADM

## 2021-11-12 RX ORDER — AMOXICILLIN 250 MG
1 CAPSULE ORAL 2 TIMES DAILY PRN
Status: DISCONTINUED | OUTPATIENT
Start: 2021-11-12 | End: 2021-11-14 | Stop reason: HOSPADM

## 2021-11-12 RX ORDER — POTASSIUM CHLORIDE 1500 MG/1
40 TABLET, EXTENDED RELEASE ORAL ONCE
Status: COMPLETED | OUTPATIENT
Start: 2021-11-12 | End: 2021-11-12

## 2021-11-12 RX ADMIN — LORAZEPAM 1 MG: 0.5 TABLET ORAL at 01:34

## 2021-11-12 RX ADMIN — TRAZODONE HYDROCHLORIDE 50 MG: 50 TABLET ORAL at 22:13

## 2021-11-12 RX ADMIN — ATENOLOL 50 MG: 50 TABLET ORAL at 17:41

## 2021-11-12 RX ADMIN — LORAZEPAM 1 MG: 0.5 TABLET ORAL at 05:19

## 2021-11-12 RX ADMIN — NICOTINE 1 PATCH: 21 PATCH, EXTENDED RELEASE TRANSDERMAL at 13:27

## 2021-11-12 RX ADMIN — DIAZEPAM 10 MG: 5 TABLET ORAL at 15:08

## 2021-11-12 RX ADMIN — POTASSIUM CHLORIDE 40 MEQ: 1500 TABLET, EXTENDED RELEASE ORAL at 03:14

## 2021-11-12 RX ADMIN — DIAZEPAM 10 MG: 5 TABLET ORAL at 13:27

## 2021-11-12 RX ADMIN — AMLODIPINE BESYLATE 2.5 MG: 2.5 TABLET ORAL at 15:07

## 2021-11-12 RX ADMIN — IBUPROFEN 400 MG: 400 TABLET, FILM COATED ORAL at 09:52

## 2021-11-12 RX ADMIN — THIAMINE HCL TAB 100 MG 100 MG: 100 TAB at 01:33

## 2021-11-12 RX ADMIN — LIDOCAINE HYDROCHLORIDE 30 ML: 20 SOLUTION ORAL; TOPICAL at 15:08

## 2021-11-12 RX ADMIN — Medication 800 MG: at 01:31

## 2021-11-12 RX ADMIN — Medication 12.5 MG: at 20:22

## 2021-11-12 RX ADMIN — FOLIC ACID 1 MG: 1 TABLET ORAL at 01:33

## 2021-11-12 RX ADMIN — DIAZEPAM 10 MG: 5 TABLET ORAL at 20:22

## 2021-11-12 RX ADMIN — DIAZEPAM 10 MG: 5 TABLET ORAL at 17:41

## 2021-11-12 RX ADMIN — THERA TABS 1 TABLET: TAB at 01:32

## 2021-11-12 RX ADMIN — LORAZEPAM 1 MG: 2 INJECTION INTRAMUSCULAR; INTRAVENOUS at 10:58

## 2021-11-12 ASSESSMENT — ACTIVITIES OF DAILY LIVING (ADL)
HYGIENE/GROOMING: INDEPENDENT
DRESS: INDEPENDENT
ORAL_HYGIENE: INDEPENDENT
LAUNDRY: WITH SUPERVISION
WALKING_OR_CLIMBING_STAIRS_DIFFICULTY: NO
HYGIENE/GROOMING: INDEPENDENT
CONCENTRATING,_REMEMBERING_OR_MAKING_DECISIONS_DIFFICULTY: NO
DRESSING/BATHING_DIFFICULTY: NO
FALL_HISTORY_WITHIN_LAST_SIX_MONTHS: NO
HEARING_DIFFICULTY_OR_DEAF: NO
LAUNDRY: WITH SUPERVISION
DRESS: INDEPENDENT
ORAL_HYGIENE: INDEPENDENT
DIFFICULTY_EATING/SWALLOWING: NO
DIFFICULTY_COMMUNICATING: NO
WEAR_GLASSES_OR_BLIND: NO
TOILETING_ISSUES: NO
PATIENT_/_FAMILY_COMMUNICATION_STYLE: SPOKEN LANGUAGE (ENGLISH OR BILINGUAL)
ADL_ASSESSMENT: WDL
DOING_ERRANDS_INDEPENDENTLY_DIFFICULTY: NO

## 2021-11-12 ASSESSMENT — ENCOUNTER SYMPTOMS
ABDOMINAL PAIN: 0
VOMITING: 0
FEVER: 0
SHORTNESS OF BREATH: 0
NERVOUS/ANXIOUS: 1

## 2021-11-12 ASSESSMENT — MIFFLIN-ST. JEOR
SCORE: 1691.03
SCORE: 1700.1

## 2021-11-12 NOTE — H&P
Psychiatry History and Physical    Aly Solitario Jr. MRN# [id number]   Age: 36 year old YOB: 1985     Date of Admission:  11/12/2021          Assessment:   This patient is a 36 year old male with history of alcohol and cannabis use, anxiety who presented to [facility] ED seeking detox from alcohol. Lives in [facility], in [place] visiting family and girlfriend. Had presented to ED over a week ago for detox but changed mind and requested to discharge home, reports his partner is sober and requesting he come in for detox. He was medically cleared in ED and admitted as voluntary patient. Drinking 1L of whiskey most days for past several weeks. Has history of withdrawal seizures over 15 years ago. Per chart has had numerous ED visits and hospitalizations around alcohol use since 2008. Presentation consistent with severe alcohol use disorder. MSSA with diazepam started to monitor and treat withdrawal. Pt also reporting anxiety, denies depression or SI. Had been prescribed lorazepam most recently, not for several weeks to months, discussed R/B/A of sertraline, he is wanting to read more information about it, declines to start at this time. Will order PRN hydroxyzine and gabapentin for anxiety as he has tried both in past and cannot recall which was more effective. He was very anxious about length of hospitalization and reviewed process to monitor for acute withdrawal and can discharge once medically out of detox. He plans to return to [place] upon discharge, declines any resources for CD treatment.      Inpatient psychiatric hospitalization is warranted at this time for safety, stabilization, and possible adjustment in medications.         Diagnoses:   Alcohol use disorder, severe  Alcohol withdrawal, severe with complication history including seizures and current lab abnormalities of hypokalemia and elevated LFTs  Cannabis use  Unspecified anxiety (CORNELL vs substance induced)  Hypokalemia  Elevated LFTs,  likely alcohol induced  Hypertension   Tachycardia   GERD         Plan:   Psychiatric treatment/inteventions:  Medications:   -MSSA with diazepam, thiamine, folic acid and multivitamin for alcohol use and withdrawal   -discussed R/B/A of starting sertraline for anxiety, pt wanting to read more information, declines to start at this time   -consider anti-craving medication prior to discharge  -PRN hydroxyzine 25mg every 4 hours for anxiety   -PRN gabapentin 100mg TID for anxiety  -PRN trazodone 50mg at bedtime for sleep     Laboratory/Imaging: ordered GGT, TSH, and lipid panel to complete admission labs; other labs reviewed: repeat K+ prior to transfer was 3.8; CMP with K+ 2.9 (L), Ca 8.0 (L), ALT 80 (H),  (H), glucose 152 (H) otherwise WNL; CBC WNL; Covid negative; Utox positive for cannabinoids; UA without evidence of infection; EtOH breath test 0.216 (H)    Patient will be treated in therapeutic milieu with appropriate individual and group therapies as described.    Medical treatment/interventions:  Medical concerns:   1) EtOH withdrawal   -MSSA as above   -withdrawal and seizure precautions  -PRN zofran and imodium for GI distress     2) IM consulted for co-management including lab abnormalities and elevated vitals, per consult note 11/12:   Aly Solitario is a 36 year old male with history of etoh abuse c/b withdrawal seizure, depression, anxiety, and HTN who was admitted to station 3A with acute etoh withdrawal     Alcohol dependency with acute withdrawal, depression, anxiety: C/b h/o withdrawal seizure. Drinking 2 pints alcohol daily PTA  - Management per psych  - Seizure precautions      HTN: Previously on triamterene-hydrochlorothiazide but not taking this med for several months. BP 120s-160s/80s-110s  - Start amlodipine with hold parameters  - Hydralazine prn  - Medicine will continue to follow BP     Hypokalemia: K low to 2.9 in the ED. Treated with 40 mEq in the ED and normalized prior to admission  to detox. Contact medicine if patient unable to tolerate oral intake in the next 24 hours     Transaminitis: , ALT 80, , Tbili 0.4. Mildly increased from BL  - Repeat LFTs 11/13 to ensure peak      Tachycardia: -120 since presentation in the setting of acute withdrawal. RRR on exam  - Contact medicine if HR>125     GERD:Frequent retrosternal burning. Start omeprazole. GI cocktail x1        Medicine will follow BP and LFTs. Please contact if future questions or concerns arise. Thank you for the opportunity to be a part of this patient's care.        Nicolette Lerma PA-C  Internal Medicine [facility] Hospitalist      Legal Status: Voluntary    Safety Assessment:   Behavioral Orders   Procedures     Code 1 - Restrict to Unit     Routine Programming     As clinically indicated     Seizure precautions     Status 15     Every 15 minutes.     Withdrawal precautions     Pt has not required locked seclusion or restraints in the past 24 hours to maintain safety, please refer to RN documentation for further details.    The risks, benefits, alternatives and side effects have been discussed and are understood by the patient.    Disposition: Pending clinical stabilization. Will likely discharge to home, plan to return to [place] when stable.    This note was created by colin using a Dragon dictation system. All typing errors or contextual distortion are unintentional and software inherent.     Luisana Stout, [facility] Psychiatry         Chief Complaint:     Alcohol withdrawal         History of Present Illness:     Aly is a 36 year old male with history of alcohol and cannabis use, anxiety who presented to [facility] ED seeking detox from alcohol.     Per ED physician note: Aly Solitario Jr. is a 36 year old male with history of alcohol abuse, alcohol withdrawal with seizure, anxiety, depression, and hypertension who presents via EMS with alcohol intoxication and anxiety. He has  had two pints of liquor today, which is a normal amount for him. He has been drinking daily for four days with his last drink being ~two hours ago. He had been sober for three years but began drinking again due to his brother's recent death from suicide. He denies any drug use. He has a history of withdrawal seizures ~10 years ago. He has been to detox before and is interested in going to one now. He is currently living in [place] with his mother but has been in town for 4-5 days. He has been out of lorazepam and Dyazide for several weeks and plans to go to his clinic to refill his prescriptions. He does not have a PCP in [place].     Upon interview pt reports that he first start drinking around age 13, became problematic by 20. First went to treatment at 19 yo. Has had periods of sobriety, longest being 3 years. Has been drinking in binge pattern recently for past few weeks, drinks 1L of whiskey when he does drink. Last use day he presented to ED. He has tolerance, drinks more than intended, difficulty cutting down, cravings and withdrawal when he stops drinking including history of seizure 15 years ago. He is currently experiencing anxiety, sweating, tremors. Denies N/V/D. He denies other substance use (Utox was positive for cannabinoids). Endorses anxiety outside context of withdrawal, reports high level of worrying at all times, difficult to control with very infrequent panic attacks. He feels these panic attacks only occur when using alcohol. He has not been taking medication for anxiety, has had trials of PRN medications including gabapentin which he was told he shouldn't take a few months ago when in the hospital and was given lorazepam. He has not taken lorazepam for several weeks to months. He has not had trial of selective serotonin reuptake inhibitor. He denies depression symptoms, denies SI. He reports he has been diagnosed with high blood pressure in the past, denies other medical concerns. He has  been staying with his mom in [place] and was in [place] visiting family/friends and plans to return to [place] upon discharge. Declines CD resources at this time.               Psychiatric Review of Systems:   Depression:   Reports: none  Denies: depressed mood, suicidal ideation, decreased interest, changes in sleep, changes in appetite, guilt, hopelessness, helplessness, impaired concentration, decreased energy, irritability.  Vanessa:   Reports: none  Denies: sleeplessness, increased goal-directed activities, abrupt increase in energy pressured speech  Psychosis:   Reports: none   Denies: visual hallucinations, auditory hallucinations, paranoia, grandiosity, ideas of reference, thought insertions, thought broadcasting.  Anxiety:   Reports: excessive worries that are difficult to control, rare panic attacks  Denies: none  PTSD:   Reports: none  Denies: re-experiencing past trauma, nightmares, increased arousal, avoidance of traumatic stimuli, impaired function.  OCD:   Reports: none  Denies: obsessions, checking, symmetry, cleaning, skin picking.  ED:   Reports: none   Denies: restriction, binging, purging.           Medical Review of Systems:     10 point review of systems is otherwise negative unless noted above.            Psychiatric History:   Psychiatric Hospitalizations: denies  History of Psychosis: denies  Prior ECT: denies  Court Commitment: denies  Suicide Attempts: denies  Self-injurious Behavior: denies  Violence toward others: denies  Use of Psychotropics: unsure, with prompting recalls gabapentin, hydroxyzine and lorazepam         Substance Use History:   Alcohol: see HPI  Cannabis: denies, UDS in ED positive  Nicotine: none  Cocaine: none  Methamphetamine: none  Opiates/Heroin: none  Benzodiazepines: was prescribed lorazepam several months ago, none recently  Hallucinogens: none  Inhalants: none      Prior Chemical Dependency treatment: 1 in [place] at age 20         Social History:   Upbringing: Born  "in [place], moved to [facility] around age 2  Educational History: HS and some college  Relationships:never , has girlfriend that lives in [place] and is sober  Children: 2 children that live in [place] with their mothers  Current Living Situation:lives with his mother in [place] for past few months, she moved there to help his aunt who is building "[employer]  Occupational History: has [employer] business, recently doing maintenance work in [place]  Financial Support: working  Legal History:hx of DWIs, recent 9 years ago  Abuse/Trauma History:denies         Family History:     H/o completed suicides in family: on interview denied, per chart brother  by suicide several years ago and this prompted a relapse in EtOH use  EtOH use disorder on both sides of family  Family History   Problem Relation Age of Onset     Suicide Brother             Past Medical History:     Past Medical History:   Diagnosis Date     Alcohol abuse      Pancreatitis      Substance abuse (H)        History of seizures: 1 previous 15 years ago in EtOH withdrawal  History of Head Trauma and/or loss of consciousness: 2 previous head injuries with LOC, occurred \"years ago\"         Past Surgical History:     Past Surgical History:   Procedure Laterality Date     SOFT TISSUE SURGERY      arm surgery with graft due to artery issue     Also reports requiring abdominal surgery as a , unsure why         Allergies:    No Known Allergies           Medications:   I have reviewed this patient's current medications  Medications Prior to Admission   Medication Sig Dispense Refill Last Dose     chlordiazePOXIDE (LIBRIUM) 25 MG capsule Take 1-2 capsules (25-50 mg) by mouth 3 times daily as needed for anxiety or withdrawal 12 capsule 0      gabapentin (NEURONTIN) 100 MG capsule Take 1 capsule (100 mg) by mouth 3 times daily for 3 days 9 capsule 0      hydrOXYzine (VISTARIL) 25 MG capsule Take 1 capsule (25 mg) by mouth 3 times daily as needed for anxiety 60 " capsule 0      LORazepam (ATIVAN) 0.5 MG tablet Take by mouth daily Unsure of dose.        triamterene-HCTZ (DYAZIDE) 37.5-25 MG capsule Take 1 capsule by mouth every morning for 3 days 3 capsule 0              Labs:     Recent Results (from the past 24 hour(s))   Asymptomatic COVID-19 Virus (Coronavirus) by PCR Nasopharyngeal    Collection Time: 11/12/21  2:03 AM    Specimen: Nasopharyngeal; Swab   Result Value Ref Range    SARS CoV2 PCR Negative Negative   Comprehensive metabolic panel    Collection Time: 11/12/21  2:04 AM   Result Value Ref Range    Sodium 137 133 - 144 mmol/L    Potassium 2.9 (L) 3.4 - 5.3 mmol/L    Chloride 102 94 - 109 mmol/L    Carbon Dioxide (CO2) 27 20 - 32 mmol/L    Anion Gap 8 3 - 14 mmol/L    Urea Nitrogen 8 7 - 30 mg/dL    Creatinine 0.71 0.66 - 1.25 mg/dL    Calcium 8.0 (L) 8.5 - 10.1 mg/dL    Glucose 152 (H) 70 - 99 mg/dL    Alkaline Phosphatase 124 40 - 150 U/L     (H) 0 - 45 U/L    ALT 80 (H) 0 - 70 U/L    Protein Total 8.2 6.8 - 8.8 g/dL    Albumin 3.7 3.4 - 5.0 g/dL    Bilirubin Total 0.4 0.2 - 1.3 mg/dL    GFR Estimate >90 >60 mL/min/1.73m2   CBC with platelets and differential    Collection Time: 11/12/21  2:04 AM   Result Value Ref Range    WBC Count 5.0 4.0 - 11.0 10e3/uL    RBC Count 5.26 4.40 - 5.90 10e6/uL    Hemoglobin 15.4 13.3 - 17.7 g/dL    Hematocrit 43.9 40.0 - 53.0 %    MCV 84 78 - 100 fL    MCH 29.3 26.5 - 33.0 pg    MCHC 35.1 31.5 - 36.5 g/dL    RDW 13.2 10.0 - 15.0 %    Platelet Count 258 150 - 450 10e3/uL    % Neutrophils 61 %    % Lymphocytes 27 %    % Monocytes 11 %    % Eosinophils 0 %    % Basophils 1 %    % Immature Granulocytes 0 %    NRBCs per 100 WBC 0 <1 /100    Absolute Neutrophils 3.0 1.6 - 8.3 10e3/uL    Absolute Lymphocytes 1.3 0.8 - 5.3 10e3/uL    Absolute Monocytes 0.6 0.0 - 1.3 10e3/uL    Absolute Eosinophils 0.0 0.0 - 0.7 10e3/uL    Absolute Basophils 0.0 0.0 - 0.2 10e3/uL    Absolute Immature Granulocytes 0.0 <=0.0 10e3/uL    Absolute  NRBCs 0.0 10e3/uL   Drug abuse screen 77 urine (FL, RH, SH)    Collection Time: 11/12/21  2:12 AM   Result Value Ref Range    Amphetamines Urine Screen Negative Screen Negative    Barbiturates Urine Screen Negative Screen Negative    Benzodiazepines Urine Screen Negative Screen Negative    Cannabinoids Urine Screen Positive (A) Screen Negative    Cocaine Urine Screen Negative Screen Negative    Opiates Urine Screen Negative Screen Negative    PCP Urine Screen Negative Screen Negative   UA with Microscopic reflex to Culture    Collection Time: 11/12/21  2:13 AM    Specimen: Urine, Midstream   Result Value Ref Range    Color Urine Yellow Colorless, Straw, Light Yellow, Yellow    Appearance Urine Clear Clear    Glucose Urine Negative Negative mg/dL    Bilirubin Urine Negative Negative    Ketones Urine Negative Negative mg/dL    Specific Gravity Urine 1.022 1.003 - 1.035    Blood Urine Small (A) Negative    pH Urine 6.0 5.0 - 7.0    Protein Albumin Urine 100  (A) Negative mg/dL    Urobilinogen Urine Normal Normal, 2.0 mg/dL    Nitrite Urine Negative Negative    Leukocyte Esterase Urine Negative Negative    Mucus Urine Present (A) None Seen /LPF    RBC Urine <1 <=2 /HPF    WBC Urine <1 <=5 /HPF   Alcohol breath test POCT    Collection Time: 11/12/21  3:17 AM   Result Value Ref Range    Alcohol Breath Test 0.216 (A) 0.00 - 0.01   Potassium    Collection Time: 11/12/21  9:28 AM   Result Value Ref Range    Potassium 3.8 3.4 - 5.3 mmol/L       There were no vitals taken for this visit.  Weight is 0 lbs 0 oz  There is no height or weight on file to calculate BMI.         Psychiatric Mental Status Examination:   Appearance: awake, alert, untidy, casually dressed  Attitude: mostly cooperative  Eye Contact: fair  Mood:  anxious  Affect: mood congruent and intensity a bit heightened  Speech:  clear, coherent and normal prosody  Language: fluent in English  Psychomotor Behavior:  no evidence of tardive dyskinesia, dystonia, or  tics  Gait/Station: normal  Thought Process:  goal oriented  Associations:  no loose associations  Thought Content:  Denying SI/HI/AVH; no evidence of psychotic thinking  Insight:  partial  Judgement: fair  Oriented to:  time, person, and place  Attention Span and Concentration:  intact  Recent and Remote Memory:  appears mostly intact to conversation, some inconsistencies in history noted  Fund of Knowledge: appropriate      Clinical Global Impressions  First:  Considering your total clinical experience with this particular patient population, how severe are the patient's symptoms at this time?: 7 (11/12/21 1634)  Compared to the patient's condition at the START of treatment, this patient's condition is: 4 (11/12/21 1634)  Most recent:  Considering your total clinical experience with this particular patient population, how severe are the patient's symptoms at this time?: 7 (11/12/21 1634)  Compared to the patient's condition at the START of treatment, this patient's condition is: 4 (11/12/21 1634)           Physical Exam:   Please refer to physical exam completed by ED provider, Cristian Castañeda MD, on 11/12/21 as below. I agree with the findings and assessment and have no additional findings to add at this time.     Physical Exam  General: Appears well-developed and well-nourished.   Head: No signs of trauma.   Eyes: Conjunctivae are normal. Pupils are equal, round, and reactive to light.   CV:  Tachycardic and regular rhythm.    Resp: Effort normal and breath sounds normal. No respiratory distress.   GI: Soft. There is no tenderness.  No rebound or guarding.  Normal bowel sounds.  No CVA tenderness.  MSK: Normal range of motion. no edema. No Calf tenderness.  Neuro: The patient is alert and oriented.  Strength in upper/lower extremities normal and symmetrical. Sensation normal. Speech normal.  Skin: Skin is warm and dry. No rash noted.   Psych: cooperative

## 2021-11-12 NOTE — ED PROVIDER NOTES
History   Chief Complaint:  Alcohol Intoxication       HPI   Aly Solitario Jr. is a 36 year old male with history of alcohol abuse, alcohol withdrawal with seizure, anxiety, depression, and hypertension who presents via EMS with alcohol intoxication and anxiety. He has had two pints of liquor today, which is a normal amount for him. He has been drinking daily for four days with his last drink being ~two hours ago. He had been sober for three years but began drinking again due to his brother's recent death from suicide. He denies any drug use. He has a history of withdrawal seizures ~10 years ago. He has been to detox before and is interested in going to one now. He is currently living in [place] with his mother but has been in town for 4-5 days. He has been out of lorazepam and Dyazide for several weeks and plans to go to his clinic to refill his prescriptions. He does not have a PCP in [place].     Review of Systems   Constitutional: Negative for fever.   Respiratory: Negative for shortness of breath.    Cardiovascular: Negative for chest pain.   Gastrointestinal: Negative for abdominal pain and vomiting.   Psychiatric/Behavioral: The patient is nervous/anxious.    All other systems reviewed and are negative.        Allergies:  The patient has no known allergies.     Medications:  Librium  Ativan  Dyazide  Vistaril    Past Medical History:     Alcohol abuse  Anxiety  Hypertension   Pancreatitis   Depression  Covid-19  Alcohol withdrawal with seizure      Past Surgical History:    Soft tissue surgery, arm     Social History:  Patient presents alone via EMS.  He is currently living in [place] with his mother.  He has a history of alcohol abuse and had been sober for three years until recently.    Physical Exam     Patient Vitals for the past 24 hrs:   BP Temp Temp src Pulse Resp SpO2 Height Weight   11/12/21 0241 124/85 -- -- 120 20 99 % -- --   11/12/21 0112 (!) 161/118 97.7  F (36.5  C) Oral 117 20 97 %  "1.702 m (5' 7\") 81.6 kg (180 lb)       Physical Exam  General: Appears well-developed and well-nourished.   Head: No signs of trauma.   Eyes: Conjunctivae are normal. Pupils are equal, round, and reactive to light.   CV:  Tachycardic and regular rhythm.    Resp: Effort normal and breath sounds normal. No respiratory distress.   GI: Soft. There is no tenderness.  No rebound or guarding.  Normal bowel sounds.  No CVA tenderness.  MSK: Normal range of motion. no edema. No Calf tenderness.  Neuro: The patient is alert and oriented.  Strength in upper/lower extremities normal and symmetrical. Sensation normal. Speech normal.  Skin: Skin is warm and dry. No rash noted.   Psych: cooperative      Emergency Department Course     Laboratory:  Labs Ordered and Resulted from Time of ED Arrival to Time of ED Departure   COMPREHENSIVE METABOLIC PANEL - Abnormal       Result Value    Sodium 137      Potassium 2.9 (*)     Chloride 102      Carbon Dioxide (CO2) 27      Anion Gap 8      Urea Nitrogen 8      Creatinine 0.71      Calcium 8.0 (*)     Glucose 152 (*)     Alkaline Phosphatase 124       (*)     ALT 80 (*)     Protein Total 8.2      Albumin 3.7      Bilirubin Total 0.4      GFR Estimate >90     ROUTINE UA WITH MICROSCOPIC REFLEX TO CULTURE - Abnormal    Color Urine Yellow      Appearance Urine Clear      Glucose Urine Negative      Bilirubin Urine Negative      Ketones Urine Negative      Specific Gravity Urine 1.022      Blood Urine Small (*)     pH Urine 6.0      Protein Albumin Urine 100  (*)     Urobilinogen Urine Normal      Nitrite Urine Negative      Leukocyte Esterase Urine Negative      Mucus Urine Present (*)     RBC Urine <1      WBC Urine <1     DRUG ABUSE SCREEN 77 URINE (FL, RH, SH) - Abnormal    Amphetamines Urine Screen Negative      Barbiturates Urine Screen Negative      Benzodiazepines Urine Screen Negative      Cannabinoids Urine Screen Positive (*)     Cocaine Urine Screen Negative      Opiates " Urine Screen Negative      PCP Urine Screen Negative     ALCOHOL BREATH TEST POCT - Abnormal    Alcohol Breath Test 0.216 (*)    COVID-19 VIRUS (CORONAVIRUS) BY PCR - Normal    SARS CoV2 PCR Negative     CBC WITH PLATELETS AND DIFFERENTIAL    WBC Count 5.0      RBC Count 5.26      Hemoglobin 15.4      Hematocrit 43.9      MCV 84      MCH 29.3      MCHC 35.1      RDW 13.2      Platelet Count 258      % Neutrophils 61      % Lymphocytes 27      % Monocytes 11      % Eosinophils 0      % Basophils 1      % Immature Granulocytes 0      NRBCs per 100 WBC 0      Absolute Neutrophils 3.0      Absolute Lymphocytes 1.3      Absolute Monocytes 0.6      Absolute Eosinophils 0.0      Absolute Basophils 0.0      Absolute Immature Granulocytes 0.0      Absolute NRBCs 0.0          Emergency Department Course:  Reviewed:  I reviewed nursing notes, vitals, past medical history and Care Everywhere    Assessments:  0114 I obtained history and examined the patient as noted above.     Consults:  0525 I spoke with the detox center.  0700 I signed the patient out to Dr. Lazaro, oncoming ED physician, pending transfer.    Interventions:  0131 Magnesium-oxide 800 mg PO  0132 Multivitamin, therapeutic 1 tab PO  0133 Thiamine 100 mg PO  0133 Folic acid 1 mg PO  0134 Ativan 1 mg PO  0314 Klor-Con 40 mEq PO  0519 Ativan 1 mg PO    Disposition:  Care of the patient was transferred to my colleague Dr. Lazaro pending transfer to detox.     Impression & Plan     CMS Diagnoses: None    Medical Decision Making:  Aly Solitario is a 36-year-old gentleman who presents due to alcohol intoxication and anxiety.  He reports a history of alcohol abuse and has been drinking fairly regularly.  He reports feeling anxious this evening ultimately prompting him to come to the hospital.  On my evaluation he was noted to be somewhat tachycardic which has been noted in the past as well.  Remainder of his work-up was unremarkable.  I did discuss detox with the  patient, which the patient was agreeable with.  We did speak with [facility] and while he does have a bed assigned there, they cannot take report until the morning.  Patient was given Ativan both to help prevent withdrawal and to help with anxiety as the patient does report a history of withdrawal seizures in the past.  Patient will be signed out to Dr. Lazaro with the plan for transfer to [facility].      Diagnosis:    ICD-10-CM    1. Acute alcoholic intoxication in alcoholism without complication (H)  F10.220    2. Anxiety  F41.9          Scribe Disclosure:  Fred COCHRAN, am serving as a scribe at 1:11 AM on 11/12/2021 to document services personally performed by Cristian Castañeda MD based on my observations and the provider's statements to me.              Cristian Castañeda MD  11/12/21 5342

## 2021-11-12 NOTE — PLAN OF CARE
Problem: Alcohol Withdrawal  Goal: Alcohol Withdrawal Symptom Control  Description: 1. Detoxification from Alcohol using the [facility] valium protocol  2. Patient will complete assessment paperwork  3.  Patient will meet with  to discuss treatment and discharge planning  4. Physical examination and Lab evaluation by MD  5. Patients oral intake will be greater than 75 % of meals to meet estimated needs  6. Adequate fluid intake    Outcome: Declining    36 year old male admitted for treatment and evaluation of alcohol use.   Pt reports drinking one liter of whiskey daily. States he binge drinks and has been drinking for the last 2 weeks. States he was sober 2 months prior to that.     Pt last used 11-. States he drank all day unknown amount.     History of alcohol withdrawal seizure 10 year ago. Denies history of DT's    Pt reports past use of ativan for several months but none for 2 months.         Pt has been to  cd treatment x 2 and outpatient x 1. Has not been to a   CD treatment program x 10 years.     Been to detox x 2 most recent [address] 2 weeks ago.     Pt denies mental health issues but was prescribed ativan for anxiety.     Pt denies SI HI SIB    Pt's  younger brother committed suicide several years ago.     Pt lives with his mother and is planning to return to [place] upon discharge. States he is not interested in going to treatment.     Pt has two children ages 7 year old and 15 years old.    Pt seen by medicine and Dr. Stout   Plan is [facility] protocol with valium.

## 2021-11-12 NOTE — CONSULTS
[facility]  Internal Medicine Consult     Aly Solitario Jr. MRN# [id number]   Age: 36 year old YOB: 1985     Date of Admission: 11/12/2021  Date of Consult: 11/12/2021    Primary Care Provider: No Ref-Primary, Physician    Requesting Service: Behavioral Health - Luisana Stout MD  Reason for Consult: General Medical Evaluation      SUBJECTIVE   CC:   HTN   Assessment and Plan/Recommendations:   Aly Solitario is a 36 year old male with history of etoh abuse c/b withdrawal seizure, depression, anxiety, and HTN who was admitted to station 3A with acute etoh withdrawal    Alcohol dependency with acute withdrawal, depression, anxiety: C/b h/o withdrawal seizure. Drinking 2 pints alcohol daily PTA  - Management per psych  - Seizure precautions     HTN: Previously on triamterene-hydrochlorothiazide but not taking this med for several months. BP 120s-160s/80s-110s  - Start amlodipine with hold parameters  - Hydralazine prn  - Medicine will continue to follow BP    Hypokalemia: K low to 2.9 in the ED. Treated with 40 mEq in the ED and normalized prior to admission to detox. Contact medicine if patient unable to tolerate oral intake in the next 24 hours    Transaminitis: , ALT 80, , Tbili 0.4. Mildly increased from BL  - Repeat LFTs 11/13 to ensure peak     Tachycardia: -120 since presentation in the setting of acute withdrawal. RRR on exam  - Contact medicine if HR>125    GERD:Frequent retrosternal burning. Start omeprazole. GI cocktail x1      Medicine will follow BP and LFTs. Please contact if future questions or concerns arise. Thank you for the opportunity to be a part of this patient's care.      Nicolette Lerma PA-C  Internal Medicine [facility] Hospitalist  Page job code [phone] (3B), [phone] (3A), or [phone] ([facility] and )  Text paging via [employer] is appreciated  November 12, 2021         HPI:   Aly Solitario is a 36 year old male with history of etoh  abuse c/b withdrawal seizure, depression, anxiety, and HTN who was admitted to station 3A with acute etoh withdrawal    Patient has been drinking 2 pints liquor daily in the setting of hs brother's death by suicide. H/o withdrawal seizure. Currently, patient reports having a poor appetite.  He is having retrosternal burning that has been occurring over the past several weeks.  Denies emesis.  No new abdominal pain.  Denies icterus or jaundice.  Denies fever or chills.  No right upper quadrant abdominal pain.  No urinary symptoms.  No change in bowels.  Denies altered mental status.  Mild shakes.  Patient denies palpitations but reports he has been told he has high heart rate with prior withdrawals.  Patient reports dyspnea since he relapsed.  States that he gets so anxious he feels short of breath.  No associated chest pain.  Dyspnea is minimal at this time.       Past Medical History:     Past Medical History:   Diagnosis Date     Alcohol abuse      Pancreatitis      Substance abuse (H)         Reviewed and updated in [employer].     Past Surgical History:      Past Surgical History:   Procedure Laterality Date     SOFT TISSUE SURGERY      arm surgery with graft due to artery issue         Social History:     Social History     Tobacco Use     Smoking status: Current Every Day Smoker     Packs/day: 0.50     Smokeless tobacco: Never Used   Substance Use Topics     Alcohol use: Yes     Comment: Whiskey 1L/day, ETOH abuse     Drug use: Yes     Types: Marijuana        Family History:     Family History   Problem Relation Age of Onset     Suicide Brother          Allergies:   No Known Allergies      Medications:   Reviewed. Please see MAR     Review of Systems:   10 point ROS of systems including Constitutional, Eyes, Respiratory, Cardiovascular, Gastroenterology, Genitourinary, Integumentary, Muscularskeletal, Psychiatric were all negative except for pertinent positives noted in my HPI.    OBJECTIVE   Physical Exam:   Vitals  "were reviewed  Blood pressure (!) 147/104, pulse 105, temperature 98.2  F (36.8  C), temperature source Temporal, resp. rate 16, height 1.702 m (5' 7\"), weight 80.7 kg (178 lb).  General: Alert and oriented x3, appears mildly anxious  HEENT: Anicteric sclera, MMM  Cardiovascular: RRR, S1S2. No murmur noted  Lungs: CTAB without wheezing or crackles   GI: Abdomen soft, non-tender with bowel sounds present. No guarding or rebound   Vascular: No peripheral edema, distal pulses palpable  Neurologic: No focal deficits, CN II-XII grossly intact  Skin: No jaundice, rashes, or lesions        Data:        Lab Results   Component Value Date     11/12/2021     06/20/2021    Lab Results   Component Value Date    CHLORIDE 102 11/12/2021    CHLORIDE 112 06/20/2021    Lab Results   Component Value Date    BUN 8 11/12/2021    BUN 5 06/20/2021      Lab Results   Component Value Date    POTASSIUM 3.8 11/12/2021    POTASSIUM 3.6 06/20/2021    Lab Results   Component Value Date    CO2 27 11/12/2021    CO2 25 06/20/2021    Lab Results   Component Value Date    CR 0.71 11/12/2021    CR 0.74 06/20/2021        Lab Results   Component Value Date    WBC 5.0 11/12/2021    HGB 15.4 11/12/2021    HCT 43.9 11/12/2021    MCV 84 11/12/2021     11/12/2021     Lab Results   Component Value Date    WBC 5.0 11/12/2021            "

## 2021-11-12 NOTE — ED NOTES
Bed: ED19  Expected date: 11/12/21  Expected time: 1:07 AM  Means of arrival: Ambulance  Comments:  Funmilayo [address] 36M intox/anxiety/withdrawel

## 2021-11-12 NOTE — ED PROVIDER NOTES
ED course:  Patient received as a handoff from my partner Dr. Castañeda.  On face-to-face evaluation patient reported moderate headache was provided ibuprofen and later provided additional Ativan for withdrawal symptoms. Remained stable throughout my care.    Impression:    ICD-10-CM    1. Acute alcoholic intoxication in alcoholism without complication (H)  F10.220    2. Anxiety  F41.9          Disposition:  Transfer to [facility] detox         Mati Lazaro,   11/12/21 2020

## 2021-11-12 NOTE — PROGRESS NOTES
11/12/21 1407   Patient Belongings   Did you bring any home meds/supplements to the hospital?  No   Patient Belongings other (see comments)   Belongings Search Yes   Clothing Search Yes   Second Staff Corey   Storage bin: shoes, belt, sweatshirt with string, Ziploc with cigs, lighter, set of keys, usb connector    Box in med room: phone, wallet    Security env # [id number]: venmo mastercard, bigboy visa card, us bank visa card    ADMISSION:  I am responsible for any personal items that are not sent to the safe or pharmacy. [facility] is not responsible for loss, theft or damage of any property in my possession.    Patient Signature _____________________ Date/Time _____________________    Staff Signature _______________________ Date/Time _____________________    2nd Staff person, if patient is unable/unwilling to sign    ___________________________________ Date/Time _____________________    DISCHARGE:    All personal items have been returned to me.    Patient Signature _____________________ Date/Time _____________________    Staff Signature _______________________ Date/Time _____________________

## 2021-11-13 LAB
ALBUMIN SERPL-MCNC: 3.2 G/DL (ref 3.4–5)
ALP SERPL-CCNC: 105 U/L (ref 40–150)
ALT SERPL W P-5'-P-CCNC: 73 U/L (ref 0–70)
AST SERPL W P-5'-P-CCNC: 78 U/L (ref 0–45)
BILIRUB DIRECT SERPL-MCNC: 0.3 MG/DL (ref 0–0.2)
BILIRUB SERPL-MCNC: 1.2 MG/DL (ref 0.2–1.3)
CHOLEST SERPL-MCNC: 206 MG/DL
GGT SERPL-CCNC: 312 U/L (ref 0–75)
HDLC SERPL-MCNC: 72 MG/DL
LDLC SERPL CALC-MCNC: 113 MG/DL
NONHDLC SERPL-MCNC: 134 MG/DL
PROT SERPL-MCNC: 7.4 G/DL (ref 6.8–8.8)
TRIGL SERPL-MCNC: 105 MG/DL
TSH SERPL DL<=0.005 MIU/L-ACNC: 1.76 MU/L (ref 0.4–4)

## 2021-11-13 PROCEDURE — 80061 LIPID PANEL: CPT | Performed by: PSYCHIATRY & NEUROLOGY

## 2021-11-13 PROCEDURE — 128N000004 HC R&B CD ADULT

## 2021-11-13 PROCEDURE — 82977 ASSAY OF GGT: CPT | Performed by: PSYCHIATRY & NEUROLOGY

## 2021-11-13 PROCEDURE — 250N000013 HC RX MED GY IP 250 OP 250 PS 637: Performed by: PHYSICIAN ASSISTANT

## 2021-11-13 PROCEDURE — 84443 ASSAY THYROID STIM HORMONE: CPT | Performed by: PSYCHIATRY & NEUROLOGY

## 2021-11-13 PROCEDURE — 250N000013 HC RX MED GY IP 250 OP 250 PS 637: Performed by: PSYCHIATRY & NEUROLOGY

## 2021-11-13 PROCEDURE — 36415 COLL VENOUS BLD VENIPUNCTURE: CPT | Performed by: PSYCHIATRY & NEUROLOGY

## 2021-11-13 PROCEDURE — 80076 HEPATIC FUNCTION PANEL: CPT | Performed by: PHYSICIAN ASSISTANT

## 2021-11-13 RX ADMIN — THIAMINE HCL TAB 100 MG 100 MG: 100 TAB at 09:06

## 2021-11-13 RX ADMIN — PANTOPRAZOLE SODIUM 40 MG: 40 TABLET, DELAYED RELEASE ORAL at 09:06

## 2021-11-13 RX ADMIN — TRAZODONE HYDROCHLORIDE 50 MG: 50 TABLET ORAL at 22:14

## 2021-11-13 RX ADMIN — FOLIC ACID 1 MG: 1 TABLET ORAL at 09:06

## 2021-11-13 RX ADMIN — NICOTINE 1 PATCH: 21 PATCH, EXTENDED RELEASE TRANSDERMAL at 09:06

## 2021-11-13 RX ADMIN — AMLODIPINE BESYLATE 2.5 MG: 2.5 TABLET ORAL at 09:06

## 2021-11-13 RX ADMIN — DIAZEPAM 5 MG: 5 TABLET ORAL at 04:41

## 2021-11-13 RX ADMIN — MULTIPLE VITAMINS W/ MINERALS TAB 1 TABLET: TAB at 09:06

## 2021-11-13 RX ADMIN — DIAZEPAM 10 MG: 5 TABLET ORAL at 12:00

## 2021-11-13 ASSESSMENT — ACTIVITIES OF DAILY LIVING (ADL)
HYGIENE/GROOMING: INDEPENDENT
ORAL_HYGIENE: INDEPENDENT
LAUNDRY: WITH SUPERVISION
HYGIENE/GROOMING: INDEPENDENT
LAUNDRY: WITH SUPERVISION
DRESS: INDEPENDENT
DRESS: INDEPENDENT
ORAL_HYGIENE: INDEPENDENT

## 2021-11-13 NOTE — PHARMACY-ADMISSION MEDICATION HISTORY
Admission Medication History Completed by Pharmacy    See [facility] Admission Navigator for allergy information, preferred outpatient pharmacy, prior to admission medications and immunization status.     Medication history sources:  Patient via phone; Surescripts dispense report    Pertinent changes made to PTA medication list:  Added: none  Deleted:   - Triamterene/HCTZ 37.5-25 mg capsules (per patient; also discontinued since admission and replaced by amlodipine)  Changed: none    Additional medication history information:   - Patient denies taking/being prescribed prescription medications and over-the-counter (OTC) products such as vitamins, sleep aids, etc.    - Aly reports he used to take gabapentin and hydroxyzine for anxiety, however these medications have not been filled at his pharmacy since February 2021.     Prior to Admission medications    Not on File        Date completed: 11/13/21    Medication history completed by:   Lori Armstrong PharmD   [facility]  Emergency Department: Ascom *[id number]

## 2021-11-13 NOTE — PROGRESS NOTES
Hiram  Narx Scores  Narcotic  020  Sedative  040  Stimulant  000  Explanation and Guidance  Overdose Risk Score  110  (Range 000-999)  Explanation and Guidance  State Indicators (0)  Details  RX Graph   Narcotic   Buprenorphine   Sedative   Stimulant   Other  Learn how to use graph  All Prescribers  Prescribers  5 - Chema Strong,  4 - Levi Garcia  3 - LUCIA Hernandez  2 - Pavel Amos  1 - Kat Keller  Timeline  11/13  2m  6m  1y  2y  Disclaimer  Morphine Milligram Equivalent Prescribed Over Time  Last 30 Days  Last 60 Days  Last 90 Days  Last 1 Year  Last 2 Years  MME  Timeframe  0  1  2  10/15/21  10/30/21  11/13/21  0  MME per Day Avg.  0  MME per RX  Disclaimer  Lorazepam MgEq (LME) Prescribed Over Time  Last 30 Days  Last 60 Days  Last 90 Days  Last 1 Year  Last 2 Years  LME  Timeframe  0  1  2  10/15/21  10/30/21  11/13/21  0  LME Per Day Avg.  0  LME mg Per Rx  Disclaimer  Buprenorphine (mg) Prescribed Over Time  Last 30 Days  Last 60 Days  Last 90 Days  Last 1 Year  Last 2 Years  mg  Timeframe  0  1  2  10/15/21  10/30/21  11/13/21  0  mg Per Day Avg.  0  Avg mg Per Rx  Disclaimer  RX Summary  Summary  Total Prescriptions 5  Total Private Pay 2  Total Prescribers 5  Total Pharmacies 5  Opioids* (excluding Buprenorphine)  Current Qty 0  Current MME/day 0.00  30 Day Avg MME/day 0.00  Buprenorphine*  Current Qty 0  Current mg/day 0.00  30 Day Avg mg/day 0.00  RX Summary Expanded  Narcotics (excluding Buprenorphine)  30 Day Avg. MME 0.00  90 Day Avg. MME 0.00  Rx Count/12 Months 0  Prescriber #/6 Months 0  Pharmacy #/6 Months 0  Current Quantity 0  Buprenorphine  30 Day Avg. mg/day 0.00  90 Day Avg. mg/day 0.00  Rx Count/12 Months 0  Prescriber #/6 Months 0  Pharmacy #/6 Months 0  Current Quantity 0  Sedatives  30 Day Avg. LME 0.00  90 Day Avg. LME 0.00  Rx Count/12 Months 0  Prescriber #/6 Months 0  Pharmacy #/6 Months 0  Current Quantity 0  Stimulants  30 Day Avg. mg/day 0.00  90 Day Avg.  mg/day 0.00  Rx Count/12 Months 0  Prescriber #/6 Months 0  Pharmacy #/6 Months 0  Current Quantity 0  Prescriptions  Total: 5  Private Pay: 2  Showing 1-5 of 5 Items View 15 Items  1 of 1   Filled  Written  Sold  ID  Drug  QTY  Days  Prescriber  RX #  Dispenser  Refill  Daily Dose*  Pymt Type     06/20/2021 06/20/2021 06/20/2021 1   Gabapentin 100 Mg Capsule  9.00 3 Je [facility] [id number] [place] ([id number]) 0/0  [place]  04/20/2021 04/20/2021 04/20/2021 4   Gabapentin 100 Mg Capsule  21.00 7 Mi Gabriel [id number] Tung (7829) 0/0  [place]  02/17/2021 02/17/2021 02/17/2021 3   Gabapentin 100 Mg Capsule  90.00 30 Am Kom [id number] [facility] (4590) 0/0  [place]  01/31/2021 01/31/2021 02/01/2021 1   Gabapentin 100 Mg Capsule  21.00 7 Al Shr [id number] Everardo (1287) 0/0  [place]  01/26/2021 01/26/2021 01/26/2021 2   Chlordiazepoxide 25 Mg Capsule  12.00 2 Ma Goe [id number] Wal (0132) 0/0 6.00 LME [place]  Showing 1-5 of 5 Items View 15 Items  1 of 1   Providers  Total: 5  Showing 1-5 of 5 Items View 15 Items  1 of 1   Name  Address  City  State  Zipcode  Phone   Chema Strogn MD [address] [facility] [address] [phone]  Kat Keller [address] [facility] 229664 [phone]  Brigitte Mendez, Mpas [address] [facility] [address] [phone]  Levi Torres  [address] [facility] [address] [phone]  Pavel Amos [address] [place] [address] [phone]  Showing 1-5 of 5 Items View 15 Items  1 of 1   Pharmacies  Total: 5  Showing 1-5 of 5 Items View 15 Items  1 of 1   Name  Address  City  State  Zipcode  Phone   [place] [phone] [facility] [address] [phone]  [facility]  [phone] [facility] [address] [phone]  [employer] [phone] [facility] [place] [address] [phone]  [facility] [phone] [place] [address] [phone]  [facility]  [place] ([phone]) [address] [place] [address] [phone]  Showing 1-5 of 5 Items View 15 Items  1 of 1   The report provided is based upon the search criteria entered and the corresponding data as it has been reported by dispenser(s). If erroneous information is identified or additional information is needed, please contact the dispenser or the prescriber provided on the report. Date Sold signifies the date the prescription was sold (left the pharmacy). The absence of Date Sold does not necessarily indicate the prescription was not dispensed. Fill Date represents the date the medication was filled or prepared by the pharmacy. Note, federal regulation (CFR Title 42: Part 2) requires patient consent prior to releasing certain patient data from federally funded opioid treatment programs (OTPs). As such, controlled substances dispensed from OTPs for medication-assisted treatment may not appear in the [place]  report. Morphine milligram equivalent (MME) conversion factors published by the CDC are used in the MME calculation. Per the CDC, the MME conversion factor is intended only for analytic purposes where prescription data are used to retrospectively calculate daily MME to inform analyses of risks associated with opioid prescribing. This value does not constitute clinical guidance or recommendations for converting patients from one form of opioid analgesic to another. Per the CDC, the conversion factors for drugs prescribed or provided, as part of medication-assisted treatment for opioid use disorder should not be used to benchmark against MME dosage thresholds meant for opioids prescribed for pain. Buprenorphine products listed in the CDC s MME file do not have an associated conversion factor. Lastly, the CDC notes, in clinical practice, calculating MME for methadone often involves a sliding-scale approach, whereby the conversion factor increases with increasing dose. The conversion factor of 3 for  "methadone presented in this file could underestimate MME for a given patient. This report contains confidential information, including patient identifiers, and is not a public record. The information on this report must be treated as protected health information and is only to be disclosed to others as authorized by applicable state and Federal regulations.  This website uses [employer], a web analytics service provided by [employer] (\"Google\"). [employer] uses \"cookies\", which are text files placed on your computer, to help the website analyze how users use the site. The information generated by the cookie about your use of the website will be transmitted to and stored by [employer] on servers in the [place].              "

## 2021-11-13 NOTE — PROGRESS NOTES
Case Management Team Note-    Writer attempted to check in with patient to discuss aftercare planning but he did not want to meet and denied wanting any help from CM. Plans to go back to [place] after discharge.

## 2021-11-13 NOTE — PLAN OF CARE
Problem: Alcohol Withdrawal  Goal: Alcohol Withdrawal Symptom Control  Description: 1. Detoxification from Alcohol using the [facility] valium protocol  2. Patient will complete assessment paperwork  3.  Patient will meet with  to discuss treatment and discharge planning  4. Physical examination and Lab evaluation by MD  5. Patients oral intake will be greater than 75 % of meals to meet estimated needs  6. Adequate fluid intake    Outcome: No Change    Pt medicated x 1 with valium 10 mg this shift. Pt has received a total of 55 mg of valium since admission.     Pt out on unit attending unit programming.     Appetite good.     Mood is slightly anxious.     BP is stabilizing.     Pt reports wanting to detox only. States he is planning on returning to [place] upon discharge.

## 2021-11-13 NOTE — PROGRESS NOTES
Brief medicine note:    Internal medicine peripherally following LFTs and blood pressure.  LFTs are improving with AST 78 (104), ALT 73 (80),  (124), T bili 1.2 (0.4).  Blood pressure remains elevated at 150/108 prior to morning meds today  -Please give morning amlodipine and recheck blood pressure 30 minutes later.  We will hold off on making changes to antiantihypertensive regimen given that morning blood pressure was prior to meds  -Medicine will continue to follow blood pressure    Nicolette Lerma PA-C  Internal Medicine [facility]  [phone]

## 2021-11-13 NOTE — PROGRESS NOTES
Pt medicated x 2 with valium 10 mg.   + hand tremors.     Pulse elevated and prn atenolol given.     BP elevated and prn hydralazine given per orders.     Pt out on unit attending unit programming.     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     Pt states he belongs to the [facility].     Pt requested and was given prn trazodone at HS.      + fluid intake. Appetite fair.

## 2021-11-13 NOTE — PROGRESS NOTES
Pt appeared to be sleeping a total of 7 hours. MSSA scores 6 and 9. Valium given at 0400. Will continue to monitor.

## 2021-11-14 VITALS
HEIGHT: 67 IN | TEMPERATURE: 98.1 F | SYSTOLIC BLOOD PRESSURE: 126 MMHG | BODY MASS INDEX: 27.94 KG/M2 | HEART RATE: 86 BPM | WEIGHT: 178 LBS | DIASTOLIC BLOOD PRESSURE: 91 MMHG | RESPIRATION RATE: 16 BRPM | OXYGEN SATURATION: 99 %

## 2021-11-14 PROCEDURE — 250N000013 HC RX MED GY IP 250 OP 250 PS 637: Performed by: PHYSICIAN ASSISTANT

## 2021-11-14 PROCEDURE — G0177 OPPS/PHP; TRAIN & EDUC SERV: HCPCS

## 2021-11-14 PROCEDURE — 250N000013 HC RX MED GY IP 250 OP 250 PS 637: Performed by: PSYCHIATRY & NEUROLOGY

## 2021-11-14 RX ADMIN — PANTOPRAZOLE SODIUM 40 MG: 40 TABLET, DELAYED RELEASE ORAL at 08:31

## 2021-11-14 RX ADMIN — FOLIC ACID 1 MG: 1 TABLET ORAL at 08:31

## 2021-11-14 RX ADMIN — AMLODIPINE BESYLATE 2.5 MG: 2.5 TABLET ORAL at 08:31

## 2021-11-14 RX ADMIN — THIAMINE HCL TAB 100 MG 100 MG: 100 TAB at 08:31

## 2021-11-14 RX ADMIN — MULTIPLE VITAMINS W/ MINERALS TAB 1 TABLET: TAB at 08:31

## 2021-11-14 NOTE — PROGRESS NOTES
Pt.is out of detox for alcohol monitoring. He has not needed valium to manage withdrawal symptoms in 24 hours. Pt.apears stable, denies all withdrawal symptoms as well as medication adverse side effects. Will continue to monitor.

## 2021-11-14 NOTE — DISCHARGE SUMMARY
Pt requested to be discharged, Dr Pantoja was informed and give discharge order. Pt is alert and oriented, ambulatory, calm and cooperative. Denies SI or SIB. instructions was given the pt, verbalized understanding the instruction. Sign and received all his belonging before discharged, pt was escorted to outside @ 1730. Cab was provided pt to take him [employer] [facility] [address] [facility].

## 2021-11-14 NOTE — PROGRESS NOTES
"Pt.had an anxious and irritable day. Affect tense and angry. Threatened to start \"swinging at everyone\" if not discharged immediately. Pt.does not have a discharge order and on-call provider said he would see patient later in the day. He did not require prn medication or seclusion/restraint to manage behavior. Aly denied SI/SIB/hallucinations. Will continue to monitor.   "

## 2021-11-14 NOTE — PROGRESS NOTES
Pt out on unit.   Tend to keep to himself and limited interaction with peers.     Pt states he Girlfriend called him stating his mother needs him back in [place].     Pt hoping to be discharged tomorrow.       Appetite good.

## 2021-11-14 NOTE — PROGRESS NOTES
"Aly participated in an OT topic session this afternoon on the topic of gratitude. Was able to name several people & things he is grateful for, and engaged in a group discussion about the benefit cultivating a \"gratitude habit.\" Became teary when discussing his mother's strength.      11/14/21 1500   Occupational Therapy   Type of Intervention structured groups   Response Initiates, socially acceptable   Hours 1     "

## 2021-11-14 NOTE — DISCHARGE INSTRUCTIONS
Behavioral Discharge Planning and Instructions  THANK YOU FOR CHOOSING THE [address]    Summary: You were admitted to [address] [place] [address] on for treatment and evaluation of alcohol withdrawal. You are being discharged today and the treatment teams recommendations are:  Recommendation: you have decline CM and requested to go home without any services.    Main Diagnoses: Dr. Stout psychiatrist  Alcohol use disorder, severe  Alcohol withdrawal, severe with complication history including seizures and current lab abnormalities of hypokalemia and elevated LFTs  Cannabis use  Unspecified anxiety (CORNELL vs substance induced)  Hypokalemia  Elevated LFTs, likely alcohol induced  Hypertension   Tachycardia   GERD    Major Treatments, Procedures and Findings: Your alcohol withdrawal was treated with valium and ativan using the Modified Selective Severity Assessment (MSSA) protocol      You were followed by Psychiatry and Medical. You met with Case Management to complete a chemical health assessment and for discharge planning. You were given a copy of your lab results which were reviewed with you. Please bring your lab results with you to your primary follow up appointment. Make your recovery a priority, Aly    Symptoms to Report: If  you experience feeling more anxiety, confusion, losing more sleep, mood declining or thoughts of suicide, notify your treatment team or notify your primary physician. IF ANY OF THE SYMPTOMS YOU ARE EXPERIENCING ARE A MEDICAL EMERGENCY CALL 911 IMMEDIATELY.    Lifestyle Adjustment: Adjust your lifestyle to get adequate sleep, relaxation, exercise and  good nutrition. Continue to develop healthy coping skills to  decrease stress and promote a sober living environment. No use of alcohol, illegal drugs or addictive medications other than what is currently prescribed. AA, NA, and  Sponsor are excellent resources for support.    Medical Provider Follow Up:    You are aware you  should make a follow up appointment with your primary care doctor for medical and medication management      Support Group:  AA/NA and Sponsor contact/support    Resources:   Recovery apps for your phone to locate current in person and zoom recovery meetings  [place] - meeting hector  AA  - meeting hector  Meeting guide - meeting hector  Quick NA meeting - meeting hector  Alex- has various apps    My Mental Health Crisis Plan allows individuals with serious mental illness to:  Clearly state treatment preferences, including treatments to use and those not to use; medications to use and those not to use; preferences for hospitals; and preferences for doctors and other mental health professionals.  Decide who can act on their behalf, by designating a trusted person (sometimes referred to as  healthcare agent,   proxy,  or  health care power of  ) as a decision-maker on their behalf. Some states require appointment of a decision-maker to carry out the PAD instructions.  Identify whom to notify in the event of a mental health crisis.  Share the plan with others, including doctors, other members of the care team, and family and friends.              Facts about COVID19 at www.cdc.gov/COVID19 and www.[place].gov/covid19         Great Pod casts for nutrition and wellness  Listen on Apple Podcasts  Dishing Up Nutrition   [employer]   Nutrition       Understand the connection between what you eat and how you feel. Hosted by licensed nutritionists and dietitians from [employer] we share practical, real-life solutions for healthier living through nutrition.                    Crisis Intervention: 332.693.6123 or 054-724-8919 (TTY: 142.704.8511).  Call anytime for help.  Suicide Awareness Voices of Education (SAVE) (www.save.org): 477-708-ZESQ (8069)  National Suicide Prevention Line (www.mentalhealthmn.org): 359-919-HRIL (1371)  National [place] [employer] Mental Illness  (www.[place].lloyd.org): 757.297.1370 or 931-600-2093.  Alcoholics Anonymous (www.alcoholics-anonymous.org): Or local information can be found 24 hours a day at:   AA Intergroup service office in [place] (http://www.aastpaul.org/) 631.880.9447  AA Intergroup service office in [facility]: [phone]. (http://www.a[place].org/)  Narcotics Anonymous (www.nam[place].org)1-520.782.3519   [facility] Behavioral Intake [phone]    [place] Recovery Connection ([facility])  [facility] connects people seeking recovery to resources that help foster and sustain long-term recovery.  Whether you are seeking resources for treatment, transportation, housing, job training, education, health care or other pathways to recovery, [facility] is a great place to start.  [phone]. Www.[facility].org    General Medication Instructions:   See your medication sheet(s) for instructions. Take all medicines as directed.  Make no changes unless your doctor directs them. Go to all your doctor visits. Be sure to have all your required lab tests. This way, your medicines can be refilled in timely fashion. Do not use any drugs not prescribed by your provider. AA/NA and Sponsors are excellent resources for support. Avoid alcohol.    Please Note: If you have any questions at anytime after you are discharged please call the [facility] detox unit 3AW unit at [phone].  [facility], Behavioral Intake [phone]  Please take this discharge folder with you to all your follow up appointments, it contains your lab results, diagnosis, medication list and discharge recommendations.      THANK YOU FOR CHOOSING THE [facility]

## 2021-11-14 NOTE — PROGRESS NOTES
Brief Medicine Note:    IM peripherally following BP which has improved over the past 24 hours. BP 130s/90s. Patinet should follow up with PCP for BP check and consideration of antihypertensive regimen adjustment if ongoing elevation.   - Continue amlodipine   - Contact medicine if BP spikes >170s/>100s or if consistently >150s/>90s  - Medicine will sign off. Please contact with future questions or concerns    Nicolette Lerma PA-C  Internal Medicine [facility]  [phone]

## 2021-11-15 ENCOUNTER — PATIENT OUTREACH (OUTPATIENT)
Dept: CARE COORDINATION | Facility: CLINIC | Age: 36
End: 2021-11-15
Payer: COMMERCIAL

## 2021-11-15 DIAGNOSIS — Z71.89 OTHER SPECIFIED COUNSELING: ICD-10-CM

## 2021-11-15 NOTE — PROGRESS NOTES
Clinic Care Coordination Contact  [facility]/Voicemail       Clinical Data: Care Coordinator Outreach    Outreach attempted x 1.  Left message on patient's voicemail with call back information and requested return call.    Plan:  Care Coordinator will try to reach patient again in 1-2 business days.    Jacquelyn Broussard, [facility]  [phone]  [facility]

## 2021-11-16 NOTE — PROGRESS NOTES
Clinic Care Coordination Contact  [facility]/Voicemail       Clinical Data: Care Coordinator Outreach    Outreach attempted x 2.  Left message on patient's voicemail with call back information and requested return call.    Plan: . Care Coordinator will do no further outreaches at this time.    Jacquelyn Broussard, [facility]  [phone]  [facility]

## 2022-02-11 ENCOUNTER — TELEPHONE (OUTPATIENT)
Dept: BEHAVIORAL HEALTH | Facility: CLINIC | Age: 37
End: 2022-02-11

## 2022-02-11 ENCOUNTER — HOSPITAL ENCOUNTER (EMERGENCY)
Facility: CLINIC | Age: 37
Discharge: HOME | End: 2022-02-11
Attending: EMERGENCY MEDICINE | Admitting: EMERGENCY MEDICINE
Payer: COMMERCIAL

## 2022-02-11 VITALS
TEMPERATURE: 98 F | BODY MASS INDEX: 29.82 KG/M2 | RESPIRATION RATE: 22 BRPM | OXYGEN SATURATION: 97 % | SYSTOLIC BLOOD PRESSURE: 143 MMHG | HEIGHT: 67 IN | HEART RATE: 122 BPM | WEIGHT: 190 LBS | DIASTOLIC BLOOD PRESSURE: 111 MMHG

## 2022-02-11 DIAGNOSIS — F10.10 ALCOHOL ABUSE: ICD-10-CM

## 2022-02-11 DIAGNOSIS — F10.929 ALCOHOLIC INTOXICATION WITH COMPLICATION (H): ICD-10-CM

## 2022-02-11 LAB
ALBUMIN SERPL-MCNC: 3.4 G/DL (ref 3.4–5)
ALP SERPL-CCNC: 122 U/L (ref 40–150)
ALT SERPL W P-5'-P-CCNC: 101 U/L (ref 0–70)
ANION GAP SERPL CALCULATED.3IONS-SCNC: 7 MMOL/L (ref 3–14)
AST SERPL W P-5'-P-CCNC: 104 U/L (ref 0–45)
BASOPHILS # BLD AUTO: 0.1 10E3/UL (ref 0–0.2)
BASOPHILS NFR BLD AUTO: 1 %
BILIRUB SERPL-MCNC: 0.3 MG/DL (ref 0.2–1.3)
BUN SERPL-MCNC: 5 MG/DL (ref 7–30)
CALCIUM SERPL-MCNC: 7.9 MG/DL (ref 8.5–10.1)
CHLORIDE BLD-SCNC: 108 MMOL/L (ref 94–109)
CO2 SERPL-SCNC: 25 MMOL/L (ref 20–32)
CREAT SERPL-MCNC: 0.71 MG/DL (ref 0.66–1.25)
EOSINOPHIL # BLD AUTO: 0.1 10E3/UL (ref 0–0.7)
EOSINOPHIL NFR BLD AUTO: 1 %
ERYTHROCYTE [DISTWIDTH] IN BLOOD BY AUTOMATED COUNT: 13 % (ref 10–15)
ETHANOL SERPL-MCNC: 0.34 G/DL
GFR SERPL CREATININE-BSD FRML MDRD: >90 ML/MIN/1.73M2
GLUCOSE BLD-MCNC: 116 MG/DL (ref 70–99)
HCT VFR BLD AUTO: 47.6 % (ref 40–53)
HGB BLD-MCNC: 15.9 G/DL (ref 13.3–17.7)
HOLD SPECIMEN: NORMAL
IMM GRANULOCYTES # BLD: 0 10E3/UL
IMM GRANULOCYTES NFR BLD: 0 %
LIPASE SERPL-CCNC: 148 U/L (ref 73–393)
LYMPHOCYTES # BLD AUTO: 1.6 10E3/UL (ref 0.8–5.3)
LYMPHOCYTES NFR BLD AUTO: 27 %
MAGNESIUM SERPL-MCNC: 1.5 MG/DL (ref 1.8–2.6)
MCH RBC QN AUTO: 29.2 PG (ref 26.5–33)
MCHC RBC AUTO-ENTMCNC: 33.4 G/DL (ref 31.5–36.5)
MCV RBC AUTO: 87 FL (ref 78–100)
MONOCYTES # BLD AUTO: 0.5 10E3/UL (ref 0–1.3)
MONOCYTES NFR BLD AUTO: 9 %
NEUTROPHILS # BLD AUTO: 3.6 10E3/UL (ref 1.6–8.3)
NEUTROPHILS NFR BLD AUTO: 62 %
NRBC # BLD AUTO: 0 10E3/UL
NRBC BLD AUTO-RTO: 0 /100
PHOSPHATE SERPL-MCNC: 1.7 MG/DL (ref 2.5–4.5)
PLATELET # BLD AUTO: 477 10E3/UL (ref 150–450)
POTASSIUM BLD-SCNC: 3.2 MMOL/L (ref 3.4–5.3)
PROT SERPL-MCNC: 7.6 G/DL (ref 6.8–8.8)
RBC # BLD AUTO: 5.45 10E6/UL (ref 4.4–5.9)
SARS-COV-2 RNA RESP QL NAA+PROBE: NEGATIVE
SODIUM SERPL-SCNC: 140 MMOL/L (ref 133–144)
WBC # BLD AUTO: 5.9 10E3/UL (ref 4–11)

## 2022-02-11 PROCEDURE — 96361 HYDRATE IV INFUSION ADD-ON: CPT

## 2022-02-11 PROCEDURE — 36415 COLL VENOUS BLD VENIPUNCTURE: CPT | Performed by: EMERGENCY MEDICINE

## 2022-02-11 PROCEDURE — 82040 ASSAY OF SERUM ALBUMIN: CPT | Performed by: EMERGENCY MEDICINE

## 2022-02-11 PROCEDURE — 99284 EMERGENCY DEPT VISIT MOD MDM: CPT | Mod: 25

## 2022-02-11 PROCEDURE — 258N000003 HC RX IP 258 OP 636: Performed by: EMERGENCY MEDICINE

## 2022-02-11 PROCEDURE — 83690 ASSAY OF LIPASE: CPT | Performed by: EMERGENCY MEDICINE

## 2022-02-11 PROCEDURE — 83735 ASSAY OF MAGNESIUM: CPT | Performed by: EMERGENCY MEDICINE

## 2022-02-11 PROCEDURE — 80053 COMPREHEN METABOLIC PANEL: CPT | Performed by: EMERGENCY MEDICINE

## 2022-02-11 PROCEDURE — 85025 COMPLETE CBC W/AUTO DIFF WBC: CPT | Performed by: EMERGENCY MEDICINE

## 2022-02-11 PROCEDURE — 82077 ASSAY SPEC XCP UR&BREATH IA: CPT | Performed by: EMERGENCY MEDICINE

## 2022-02-11 PROCEDURE — 96375 TX/PRO/DX INJ NEW DRUG ADDON: CPT

## 2022-02-11 PROCEDURE — 96365 THER/PROPH/DIAG IV INF INIT: CPT

## 2022-02-11 PROCEDURE — 87635 SARS-COV-2 COVID-19 AMP PRB: CPT | Performed by: EMERGENCY MEDICINE

## 2022-02-11 PROCEDURE — 84100 ASSAY OF PHOSPHORUS: CPT | Performed by: EMERGENCY MEDICINE

## 2022-02-11 PROCEDURE — 250N000009 HC RX 250: Performed by: EMERGENCY MEDICINE

## 2022-02-11 PROCEDURE — 250N000011 HC RX IP 250 OP 636: Performed by: EMERGENCY MEDICINE

## 2022-02-11 PROCEDURE — C9803 HOPD COVID-19 SPEC COLLECT: HCPCS

## 2022-02-11 PROCEDURE — 250N000013 HC RX MED GY IP 250 OP 250 PS 637: Performed by: EMERGENCY MEDICINE

## 2022-02-11 RX ORDER — FOLIC ACID 1 MG/1
1 TABLET ORAL DAILY
Status: DISCONTINUED | OUTPATIENT
Start: 2022-02-11 | End: 2022-02-11

## 2022-02-11 RX ORDER — MULTIPLE VITAMINS W/ MINERALS TAB 9MG-400MCG
1 TAB ORAL DAILY
Status: DISCONTINUED | OUTPATIENT
Start: 2022-02-11 | End: 2022-02-11

## 2022-02-11 RX ORDER — HYDROXYZINE HYDROCHLORIDE 25 MG/1
TABLET, FILM COATED ORAL
COMMUNITY
Start: 2022-01-23

## 2022-02-11 RX ORDER — ACETAMINOPHEN 325 MG/1
975 TABLET ORAL ONCE
Status: COMPLETED | OUTPATIENT
Start: 2022-02-11 | End: 2022-02-11

## 2022-02-11 RX ORDER — DIAZEPAM 5 MG
10 TABLET ORAL EVERY 30 MIN PRN
Status: DISCONTINUED | OUTPATIENT
Start: 2022-02-11 | End: 2022-02-11 | Stop reason: HOSPADM

## 2022-02-11 RX ORDER — POTASSIUM CHLORIDE 1500 MG/1
40 TABLET, EXTENDED RELEASE ORAL ONCE
Status: COMPLETED | OUTPATIENT
Start: 2022-02-11 | End: 2022-02-11

## 2022-02-11 RX ORDER — NALTREXONE HYDROCHLORIDE 50 MG/1
50 TABLET, FILM COATED ORAL DAILY
COMMUNITY
Start: 2022-02-02

## 2022-02-11 RX ORDER — LORAZEPAM 0.5 MG/1
0.5 TABLET ORAL
COMMUNITY
Start: 2022-02-02

## 2022-02-11 RX ORDER — PROPRANOLOL HYDROCHLORIDE 40 MG/1
40 TABLET ORAL 2 TIMES DAILY
COMMUNITY
Start: 2022-02-02

## 2022-02-11 RX ORDER — FLUMAZENIL 0.1 MG/ML
0.2 INJECTION, SOLUTION INTRAVENOUS
Status: DISCONTINUED | OUTPATIENT
Start: 2022-02-11 | End: 2022-02-11 | Stop reason: HOSPADM

## 2022-02-11 RX ORDER — DIAZEPAM 10 MG/2ML
5-10 INJECTION, SOLUTION INTRAMUSCULAR; INTRAVENOUS EVERY 30 MIN PRN
Status: DISCONTINUED | OUTPATIENT
Start: 2022-02-11 | End: 2022-02-11 | Stop reason: HOSPADM

## 2022-02-11 RX ORDER — ONDANSETRON 4 MG/1
4 TABLET, ORALLY DISINTEGRATING ORAL
COMMUNITY

## 2022-02-11 RX ORDER — MULTIPLE VITAMINS W/ MINERALS TAB 9MG-400MCG
1 TAB ORAL DAILY
COMMUNITY
Start: 2021-01-31

## 2022-02-11 RX ORDER — KETOROLAC TROMETHAMINE 15 MG/ML
15 INJECTION, SOLUTION INTRAMUSCULAR; INTRAVENOUS ONCE
Status: COMPLETED | OUTPATIENT
Start: 2022-02-11 | End: 2022-02-11

## 2022-02-11 RX ORDER — BUPROPION HYDROCHLORIDE 75 MG/1
75 TABLET ORAL 2 TIMES DAILY
COMMUNITY
Start: 2022-02-02

## 2022-02-11 RX ORDER — LISINOPRIL 10 MG/1
10 TABLET ORAL DAILY
COMMUNITY
Start: 2022-01-23

## 2022-02-11 RX ADMIN — THIAMINE HYDROCHLORIDE: 100 INJECTION, SOLUTION INTRAMUSCULAR; INTRAVENOUS at 08:05

## 2022-02-11 RX ADMIN — KETOROLAC TROMETHAMINE 15 MG: 15 INJECTION, SOLUTION INTRAMUSCULAR; INTRAVENOUS at 07:56

## 2022-02-11 RX ADMIN — SODIUM CHLORIDE 1000 ML: 9 INJECTION, SOLUTION INTRAVENOUS at 08:38

## 2022-02-11 RX ADMIN — DIAZEPAM 10 MG: 5 TABLET ORAL at 09:29

## 2022-02-11 RX ADMIN — POTASSIUM CHLORIDE 40 MEQ: 1500 TABLET, EXTENDED RELEASE ORAL at 08:38

## 2022-02-11 RX ADMIN — ACETAMINOPHEN 975 MG: 325 TABLET, FILM COATED ORAL at 07:54

## 2022-02-11 RX ADMIN — DIAZEPAM 10 MG: 5 TABLET ORAL at 07:54

## 2022-02-11 ASSESSMENT — ENCOUNTER SYMPTOMS
TREMORS: 1
HALLUCINATIONS: 0
VOMITING: 0

## 2022-02-11 ASSESSMENT — MIFFLIN-ST. JEOR: SCORE: 1745.46

## 2022-02-11 NOTE — ED NOTES
Patient is resting quietly on cart with eyes closed. Respirations are regular and unlabored. Patient repositions self independently on cart. Patient on ECG NIBP and SaO2 monitoring. Patient has intermittent drops in SaO2 to 88% on RA. Patient recovers spontaneously and/or if RN awakens patient.     Patient denies any pain or needs at present. Patient sat himself upright on cart. Eating breakfast. Denies additional needs. Continue to monitor.

## 2022-02-11 NOTE — ED NOTES
Awake, alert and oriented to person, place, time and situation.    Airway patent.    Respirations are regular and unlabored.  Patient talking in full sentences.  Patient denies cough or shortness of breath.    Pulses are strong and regular and tachycardic.  Skin is normal color, warm and dry.   Cap refill is less than 3 seconds.  Patient denies chest pain/pressure.    Last alcoholic beverage was 0600 02/11/2022    Denies suicidal ideation.     Wants help getting sober.

## 2022-02-11 NOTE — ED NOTES
Patient given scrubs to change into. Patient instructed on continued POC and waits. Patient requesting medication for anxiety.

## 2022-02-11 NOTE — ED NOTES
Patient call light answered. Patient requests clothing so he can go home. MD Key updated. MD at bedside to evaluate.  Patient states feeling well. Reports no longer wishes to go to detox. Patient informed by MD that he will start to feel worse or he will drink again. Patient acknowledges this information and continues to request discharge.

## 2022-02-11 NOTE — TELEPHONE ENCOUNTER
S:  11:35 AM  Call from [facility] ED requesting IP Detox bed for a 36/M    B:  Hx of  Alcohol use disorder and anxiety presented to the [place] ED requesting detox.  Patient reports drinking 1L of hard alcohol daily for several weeks.  Last drink this morning, just prior to coming to the ED.  2 recent detox admissions.  Patient would like to stop drinking and go to tx.    Alcohol level  0.34  CIWA  20 of oral valium      K 3.2  Replaced ALT and AST slightly elevated    Magnesium 1.5  Phosphorous 1.7        A: vol    R:  Patient cleared and ready for behavioral bed placement: Yes

## 2022-02-11 NOTE — ED NOTES
Patient updated on continued POC and waits. HOB adjusted for comfort. Lights dimmed for comfort. Bed in lowest position with side rails up x 2. Call light in easy reach. Patient on ECG NIBP and SaO2 monitoring. Continue to monitor.

## 2022-02-11 NOTE — ED NOTES
Patient is resting quietly on cart with eyes closed. Respirations are regular and unlabored. Patient repositions self independently on cart. Patient continues on ECG NIBP and SaO2 monitoring. VSS. Continue to monitor.

## 2022-02-11 NOTE — ED NOTES
[place] central intake contacted regarding detox availability. Chiquita reports male bed availability. RN verified labs resulted. MD Key notified of need to call and give clinical picture for admission.

## 2022-02-11 NOTE — ED PROVIDER NOTES
History   Chief Complaint:  Alcohol Problem    The history is provided by the patient.      Aly Solitario Jr. is a 36 year old male with history of alcohol abuse, substance abuse, depression, and hypertension who presents with an alcohol problem. The patient reports that he is experiencing withdrawals beginning about 1 hour prior to arrival, the time of his last drink. He notes that he has been drinking regularly for the past 3 days and finally reached the point where he wants to stop. Prior to this, he notes drinking about once a month. He has been previously treated for alcohol abuse at age 20, about 16 years ago, and has been through detox a number of times. He also experienced a withdrawal seizure about 10 years ago. He denies suicidal thoughts, hallucinations, underlying depression, or any recent stressors. He also denies vomiting, pancreatitis, or other physical complaints. He currently takes naltrexone for alcohol abuse, with his most recent dose being last week. He is also currently on Ativan for generalized anxiety, with his last dose being last night. He is not actively seeing a mental health professional.     Review of Systems   Gastrointestinal: Negative for vomiting.   Neurological: Positive for tremors (alcohol withdrawal).   Psychiatric/Behavioral: Negative for hallucinations and suicidal ideas.   All other systems reviewed and are negative.      Allergies:  No known drug allergies    Medications:  Valium  Wellbutrin  Atarax  Zestril  Ativan  Naltrexone  Zofran  Inderal  Ativan    Past Medical History:     Alcohol abuse with withdrawal including seizures  Depression  Pancreatitis  Substance abuse  COVID-19 infection  Boxer's fracture  Hypertension  Anxiety  Chlamydia    Past Surgical History:    Unspecified arm surgery with graft due to artery issue     Family History:    Brother: suicide    Social History:  The patient presents to the ED alone.  He lives with sister.  He has 2 daughters and has  "visitation rights once a month.  His brother committed suicide in 2018, about 4 years ago.  He is an  with focus on residential and commercial mike.    Physical Exam     Patient Vitals for the past 24 hrs:   BP Temp Temp src Pulse Resp SpO2 Height Weight   02/11/22 1305 (!) 143/111 -- -- (!) 122 22 97 % -- --   02/11/22 1245 -- -- -- 117 16 95 % -- --   02/11/22 1230 -- -- -- 105 19 97 % -- --   02/11/22 1215 -- -- -- 120 21 95 % -- --   02/11/22 1200 -- -- -- 112 24 92 % -- --   02/11/22 1145 -- -- -- 101 18 93 % -- --   02/11/22 1130 -- -- -- 104 18 94 % -- --   02/11/22 1115 -- -- -- 107 17 95 % -- --   02/11/22 1100 -- -- -- 108 20 92 % -- --   02/11/22 1045 -- -- -- (!) 133 13 95 % -- --   02/11/22 1030 -- -- -- 109 18 91 % -- --   02/11/22 1015 -- -- -- 109 18 93 % -- --   02/11/22 1000 -- -- -- 112 20 92 % -- --   02/11/22 0945 -- -- -- 111 19 93 % -- --   02/11/22 0930 -- -- -- 116 20 97 % -- --   02/11/22 0915 -- -- -- -- -- 95 % -- --   02/11/22 0900 (!) 132/93 -- -- 114 -- 99 % -- --   02/11/22 0845 -- -- -- 103 16 96 % -- --   02/11/22 0830 -- -- -- 108 19 95 % -- --   02/11/22 0815 -- -- -- 107 21 94 % -- --   02/11/22 0800 132/89 -- -- 115 27 92 % -- --   02/11/22 0745 -- -- -- 120 17 96 % -- --   02/11/22 0733 (!) 125/94 -- -- (!) 128 21 96 % -- --   02/11/22 0703 (!) 136/95 98  F (36.7  C) Temporal (!) 130 18 95 % 1.702 m (5' 7\") 86.2 kg (190 lb)       Physical Exam   GENERAL: well developed, pleasant  HEAD: atraumatic  EYES: pupils reactive, extraocular muscles intact, conjunctivae normal  ENT:  mucus membranes moist  NECK:  trachea midline, normal range of motion  RESPIRATORY: no tachypnea, breath sounds clear to auscultation   CVS: normal S1/S2, no murmurs, intact distal pulses. Mild tachycardia.  ABDOMEN: soft, nontender, nondistention  MUSCULOSKELETAL: no deformities  SKIN: warm and dry, no acute rashes or ulceration  NEURO: GCS 15, cranial nerves intact, alert and " oriented x3  PSYCH:  Mood/affect normal    Emergency Department Course     Laboratory:  Labs Ordered and Resulted from Time of ED Arrival to Time of ED Departure   COMPREHENSIVE METABOLIC PANEL - Abnormal       Result Value    Sodium 140      Potassium 3.2 (*)     Chloride 108      Carbon Dioxide (CO2) 25      Anion Gap 7      Urea Nitrogen 5 (*)     Creatinine 0.71      Calcium 7.9 (*)     Glucose 116 (*)     Alkaline Phosphatase 122       (*)      (*)     Protein Total 7.6      Albumin 3.4      Bilirubin Total 0.3      GFR Estimate >90     ETHYL ALCOHOL LEVEL - Abnormal    Alcohol ethyl 0.34 (*)    CBC WITH PLATELETS AND DIFFERENTIAL - Abnormal    WBC Count 5.9      RBC Count 5.45      Hemoglobin 15.9      Hematocrit 47.6      MCV 87      MCH 29.2      MCHC 33.4      RDW 13.0      Platelet Count 477 (*)     % Neutrophils 62      % Lymphocytes 27      % Monocytes 9      % Eosinophils 1      % Basophils 1      % Immature Granulocytes 0      NRBCs per 100 WBC 0      Absolute Neutrophils 3.6      Absolute Lymphocytes 1.6      Absolute Monocytes 0.5      Absolute Eosinophils 0.1      Absolute Basophils 0.1      Absolute Immature Granulocytes 0.0      Absolute NRBCs 0.0     MAGNESIUM - Abnormal    Magnesium 1.5 (*)    PHOSPHORUS - Abnormal    Phosphorus 1.7 (*)    COVID-19 VIRUS (CORONAVIRUS) BY PCR - Normal    SARS CoV2 PCR Negative     LIPASE - Normal    Lipase 148          Emergency Department Course:       Reviewed:  I reviewed nursing notes, vitals, past medical history and Care Everywhere    Assessments:  0728 I obtained history and examined the patient as noted above.   1126 I rechecked the patient; he wishes to be discharged.    Interventions:  0754 Tylenol 975 mg PO  0754 Valium 10 mg PO  0756 Toradol 15 mg IV  0805 NS with infuvite, thiamine, and folic acid 1 L IV  0838 NS Bolus 1 L IV  0838 Klor-Con 40 mEq PO  0929 Valium 10 mg PO    Disposition:  The patient was discharged to home.      Impression & Plan     Medical Decision Making:  Patient presents requesting detox.  He notes he has underlying anxiety.  Lab work shows that he is intoxicated.  He was started on the CIWA protocol.  Patient received IV fluids and IV banana bag.  He received oral potassium.  He received oral Valium.  I was then alerted that he is feeling better and would like to leave and does not want to go to detox.  Discussed with him that the medication will would wear off and that his symptoms would return.  Discussed with him the importance of going through a detox and to stopping drinking.  Patient's not suicidal.  He is able to make clear informed decisions.  Patient is requesting his close so he can leave.  I had called over to [facility] and was working on a detox bed for him.  They alerted us that he could have a bed later on in the afternoon.    Diagnosis:    ICD-10-CM    1. Alcoholic intoxication with complication (H)  F10.929    2. Alcohol abuse  F10.10        Scribe Disclosure:  I, Sean Hastings, am serving as a scribe at 7:26 AM on 2/11/2022 to document services personally performed by Melecio Key MD based on my observations and the provider's statements to me.              Melecio Key MD  02/11/22 9630

## 2022-02-11 NOTE — ED TRIAGE NOTES
Aly lives in [place] with his sister. Has been on a 3 days alcohol binge and staying at hotel in [place]. Patient is independent mike contractor. Patient has been drinking about 1 liter of vodka/day x 3 days.     Patient reports really bad alcohol withdrawal symptoms which caused him to call 911. Here to detox and get help.     Social stressors - recent death of family member.

## 2022-02-11 NOTE — ED NOTES
Patient call light answered by RYAN Lewis. Patient reports need to urinate. Patient escorted to BR. Gait stable. Patient given supplies and requested to provide a urine sample. Patient verbalized understanding.
